# Patient Record
Sex: FEMALE | Race: WHITE | Employment: OTHER | URBAN - METROPOLITAN AREA
[De-identification: names, ages, dates, MRNs, and addresses within clinical notes are randomized per-mention and may not be internally consistent; named-entity substitution may affect disease eponyms.]

---

## 2017-01-18 RX ORDER — CARVEDILOL 3.12 MG/1
TABLET ORAL
Qty: 180 TABLET | Refills: 0 | Status: SHIPPED | OUTPATIENT
Start: 2017-01-18 | End: 2017-02-09

## 2017-02-06 RX ORDER — MIRABEGRON 25 MG/1
TABLET, FILM COATED, EXTENDED RELEASE ORAL
Qty: 90 TABLET | Refills: 3 | Status: SHIPPED | OUTPATIENT
Start: 2017-02-06 | End: 2017-02-09 | Stop reason: SDUPTHER

## 2017-02-09 ENCOUNTER — OFFICE VISIT (OUTPATIENT)
Dept: INTERNAL MEDICINE CLINIC | Age: 77
End: 2017-02-09

## 2017-02-09 VITALS
SYSTOLIC BLOOD PRESSURE: 120 MMHG | HEIGHT: 62 IN | HEART RATE: 56 BPM | WEIGHT: 133 LBS | BODY MASS INDEX: 24.48 KG/M2 | DIASTOLIC BLOOD PRESSURE: 78 MMHG

## 2017-02-09 DIAGNOSIS — F31.9 BIPOLAR 1 DISORDER (HCC): ICD-10-CM

## 2017-02-09 DIAGNOSIS — R00.1 SINUS BRADYCARDIA: ICD-10-CM

## 2017-02-09 DIAGNOSIS — F31.76 BIPOLAR DISORDER, IN FULL REMISSION, MOST RECENT EPISODE DEPRESSED (HCC): ICD-10-CM

## 2017-02-09 DIAGNOSIS — E78.2 MIXED HYPERLIPIDEMIA: ICD-10-CM

## 2017-02-09 DIAGNOSIS — F03.90 DEMENTIA WITHOUT BEHAVIORAL DISTURBANCE, UNSPECIFIED DEMENTIA TYPE: ICD-10-CM

## 2017-02-09 DIAGNOSIS — I25.89 CARDIAC MICROVASCULAR DISEASE: ICD-10-CM

## 2017-02-09 DIAGNOSIS — I10 ESSENTIAL HYPERTENSION: Primary | ICD-10-CM

## 2017-02-09 DIAGNOSIS — N39.41 URGE URINARY INCONTINENCE: ICD-10-CM

## 2017-02-09 PROCEDURE — 0509F URINE INCON PLAN DOCD: CPT | Performed by: INTERNAL MEDICINE

## 2017-02-09 PROCEDURE — 1123F ACP DISCUSS/DSCN MKR DOCD: CPT | Performed by: INTERNAL MEDICINE

## 2017-02-09 PROCEDURE — 1036F TOBACCO NON-USER: CPT | Performed by: INTERNAL MEDICINE

## 2017-02-09 PROCEDURE — G8399 PT W/DXA RESULTS DOCUMENT: HCPCS | Performed by: INTERNAL MEDICINE

## 2017-02-09 PROCEDURE — G8599 NO ASA/ANTIPLAT THER USE RNG: HCPCS | Performed by: INTERNAL MEDICINE

## 2017-02-09 PROCEDURE — 1090F PRES/ABSN URINE INCON ASSESS: CPT | Performed by: INTERNAL MEDICINE

## 2017-02-09 PROCEDURE — G8420 CALC BMI NORM PARAMETERS: HCPCS | Performed by: INTERNAL MEDICINE

## 2017-02-09 PROCEDURE — 99214 OFFICE O/P EST MOD 30 MIN: CPT | Performed by: INTERNAL MEDICINE

## 2017-02-09 PROCEDURE — G8484 FLU IMMUNIZE NO ADMIN: HCPCS | Performed by: INTERNAL MEDICINE

## 2017-02-09 PROCEDURE — 4040F PNEUMOC VAC/ADMIN/RCVD: CPT | Performed by: INTERNAL MEDICINE

## 2017-02-09 PROCEDURE — G8427 DOCREV CUR MEDS BY ELIG CLIN: HCPCS | Performed by: INTERNAL MEDICINE

## 2017-02-09 RX ORDER — ISOSORBIDE MONONITRATE 30 MG/1
30 TABLET, EXTENDED RELEASE ORAL DAILY
Qty: 90 TABLET | Refills: 3 | Status: SHIPPED | OUTPATIENT
Start: 2017-02-09 | End: 2017-05-04

## 2017-02-09 RX ORDER — ISOSORBIDE MONONITRATE 30 MG/1
30 TABLET, EXTENDED RELEASE ORAL DAILY
Qty: 90 TABLET | Refills: 3 | Status: SHIPPED | OUTPATIENT
Start: 2017-02-09 | End: 2017-02-09 | Stop reason: SDUPTHER

## 2017-02-09 ASSESSMENT — ENCOUNTER SYMPTOMS
WHEEZING: 0
ABDOMINAL PAIN: 0
SHORTNESS OF BREATH: 0
BLOOD IN STOOL: 0
VOMITING: 0
DIARRHEA: 0
CHEST TIGHTNESS: 0
NAUSEA: 0
COUGH: 0

## 2017-02-10 LAB — TSH REFLEX: 3.46 UIU/ML (ref 0.27–4.2)

## 2017-02-10 RX ORDER — ATORVASTATIN CALCIUM 20 MG/1
TABLET, FILM COATED ORAL
Qty: 90 TABLET | Refills: 0 | Status: SHIPPED | OUTPATIENT
Start: 2017-02-10 | End: 2017-05-04 | Stop reason: SDUPTHER

## 2017-03-03 RX ORDER — TRAMADOL HYDROCHLORIDE 50 MG/1
50 TABLET ORAL EVERY 6 HOURS PRN
Qty: 60 TABLET | Refills: 0 | Status: ON HOLD | OUTPATIENT
Start: 2017-03-03 | End: 2017-03-29

## 2017-03-09 ENCOUNTER — TELEPHONE (OUTPATIENT)
Dept: INTERNAL MEDICINE CLINIC | Age: 77
End: 2017-03-09

## 2017-03-16 ENCOUNTER — HOSPITAL ENCOUNTER (OUTPATIENT)
Dept: OTHER | Age: 77
Discharge: HOME | End: 2017-03-16
Attending: INTERNAL MEDICINE | Admitting: INTERNAL MEDICINE

## 2017-03-16 ENCOUNTER — TELEPHONE (OUTPATIENT)
Dept: INTERNAL MEDICINE CLINIC | Age: 77
End: 2017-03-16

## 2017-03-16 DIAGNOSIS — R00.2 PALPITATIONS: Primary | ICD-10-CM

## 2017-03-16 LAB
EKG ATRIAL RATE: 86 BPM
EKG DIAGNOSIS: NORMAL
EKG P AXIS: -7 DEGREES
EKG P-R INTERVAL: 158 MS
EKG Q-T INTERVAL: 368 MS
EKG QRS DURATION: 80 MS
EKG QTC CALCULATION (BAZETT): 440 MS
EKG R AXIS: 30 DEGREES
EKG T AXIS: 0 DEGREES
EKG VENTRICULAR RATE: 86 BPM

## 2017-03-16 PROCEDURE — 93010 ELECTROCARDIOGRAM REPORT: CPT | Performed by: INTERNAL MEDICINE

## 2017-03-16 PROCEDURE — 93005 ELECTROCARDIOGRAM TRACING: CPT | Performed by: INTERNAL MEDICINE

## 2017-03-17 ENCOUNTER — CARE COORDINATION (OUTPATIENT)
Dept: CARE COORDINATION | Age: 77
End: 2017-03-17

## 2017-03-17 ENCOUNTER — TELEPHONE (OUTPATIENT)
Dept: INTERNAL MEDICINE CLINIC | Age: 77
End: 2017-03-17

## 2017-03-17 DIAGNOSIS — R07.9 CHEST PAIN, UNSPECIFIED TYPE: Primary | ICD-10-CM

## 2017-03-17 DIAGNOSIS — R94.31 ABNORMAL EKG: ICD-10-CM

## 2017-03-20 PROBLEM — R06.09 DOE (DYSPNEA ON EXERTION): Status: ACTIVE | Noted: 2017-03-20

## 2017-03-21 PROBLEM — N12 PYELONEPHRITIS: Status: ACTIVE | Noted: 2017-03-21

## 2017-03-21 PROBLEM — J96.01 ACUTE RESPIRATORY FAILURE WITH HYPOXIA (HCC): Status: ACTIVE | Noted: 2017-03-21

## 2017-03-21 PROBLEM — J69.0 ASPIRATION PNEUMONITIS (HCC): Status: ACTIVE | Noted: 2017-03-21

## 2017-03-24 RX ORDER — TRAMADOL HYDROCHLORIDE 50 MG/1
50 TABLET ORAL EVERY 6 HOURS PRN
Qty: 180 TABLET | Refills: 0 | Status: CANCELLED | OUTPATIENT
Start: 2017-03-24

## 2017-03-27 PROBLEM — R50.9 FEVER: Status: ACTIVE | Noted: 2017-03-27

## 2017-03-27 RX ORDER — CARVEDILOL 3.12 MG/1
TABLET ORAL
Qty: 180 TABLET | Refills: 0 | Status: SHIPPED | OUTPATIENT
Start: 2017-03-27 | End: 2017-05-04

## 2017-03-28 ENCOUNTER — CARE COORDINATOR VISIT (OUTPATIENT)
Dept: CASE MANAGEMENT | Age: 77
End: 2017-03-28

## 2017-03-28 PROBLEM — R93.89 ABNORMAL CXR: Status: ACTIVE | Noted: 2017-03-28

## 2017-04-06 ENCOUNTER — CARE COORDINATION (OUTPATIENT)
Dept: CASE MANAGEMENT | Age: 77
End: 2017-04-06

## 2017-04-06 RX ORDER — OLANZAPINE AND FLUOXETINE 6; 25 MG/1; MG/1
CAPSULE ORAL
Qty: 90 CAPSULE | Refills: 0 | Status: SHIPPED | OUTPATIENT
Start: 2017-04-06 | End: 2017-07-24 | Stop reason: SDUPTHER

## 2017-04-06 RX ORDER — DONEPEZIL HYDROCHLORIDE 10 MG/1
TABLET, FILM COATED ORAL
Qty: 90 TABLET | Refills: 1 | Status: SHIPPED | OUTPATIENT
Start: 2017-04-06 | End: 2017-05-04

## 2017-04-13 ENCOUNTER — CARE COORDINATION (OUTPATIENT)
Dept: CASE MANAGEMENT | Age: 77
End: 2017-04-13

## 2017-04-27 ENCOUNTER — CARE COORDINATION (OUTPATIENT)
Dept: CASE MANAGEMENT | Age: 77
End: 2017-04-27

## 2017-04-29 ENCOUNTER — CARE COORDINATION (OUTPATIENT)
Dept: CASE MANAGEMENT | Age: 77
End: 2017-04-29

## 2017-04-30 ENCOUNTER — CARE COORDINATION (OUTPATIENT)
Dept: CASE MANAGEMENT | Age: 77
End: 2017-04-30

## 2017-05-01 ENCOUNTER — CARE COORDINATION (OUTPATIENT)
Dept: CASE MANAGEMENT | Age: 77
End: 2017-05-01

## 2017-05-04 ENCOUNTER — OFFICE VISIT (OUTPATIENT)
Dept: INTERNAL MEDICINE CLINIC | Age: 77
End: 2017-05-04

## 2017-05-04 VITALS
DIASTOLIC BLOOD PRESSURE: 60 MMHG | HEIGHT: 60 IN | SYSTOLIC BLOOD PRESSURE: 88 MMHG | WEIGHT: 125 LBS | HEART RATE: 68 BPM | BODY MASS INDEX: 24.54 KG/M2

## 2017-05-04 DIAGNOSIS — N20.0 RENAL CALCULI: ICD-10-CM

## 2017-05-04 DIAGNOSIS — I10 ESSENTIAL HYPERTENSION: ICD-10-CM

## 2017-05-04 DIAGNOSIS — E78.2 MIXED HYPERLIPIDEMIA: Primary | ICD-10-CM

## 2017-05-04 DIAGNOSIS — F31.76 BIPOLAR DISORDER, IN FULL REMISSION, MOST RECENT EPISODE DEPRESSED (HCC): ICD-10-CM

## 2017-05-04 DIAGNOSIS — N39.41 URGE URINARY INCONTINENCE: ICD-10-CM

## 2017-05-04 DIAGNOSIS — K21.9 GASTROESOPHAGEAL REFLUX DISEASE WITHOUT ESOPHAGITIS: ICD-10-CM

## 2017-05-04 PROBLEM — J96.01 ACUTE RESPIRATORY FAILURE WITH HYPOXIA (HCC): Status: RESOLVED | Noted: 2017-03-21 | Resolved: 2017-05-04

## 2017-05-04 PROCEDURE — 1123F ACP DISCUSS/DSCN MKR DOCD: CPT | Performed by: INTERNAL MEDICINE

## 2017-05-04 PROCEDURE — G8598 ASA/ANTIPLAT THER USED: HCPCS | Performed by: INTERNAL MEDICINE

## 2017-05-04 PROCEDURE — G8420 CALC BMI NORM PARAMETERS: HCPCS | Performed by: INTERNAL MEDICINE

## 2017-05-04 PROCEDURE — G8399 PT W/DXA RESULTS DOCUMENT: HCPCS | Performed by: INTERNAL MEDICINE

## 2017-05-04 PROCEDURE — 4040F PNEUMOC VAC/ADMIN/RCVD: CPT | Performed by: INTERNAL MEDICINE

## 2017-05-04 PROCEDURE — 99214 OFFICE O/P EST MOD 30 MIN: CPT | Performed by: INTERNAL MEDICINE

## 2017-05-04 PROCEDURE — G8427 DOCREV CUR MEDS BY ELIG CLIN: HCPCS | Performed by: INTERNAL MEDICINE

## 2017-05-04 PROCEDURE — 1036F TOBACCO NON-USER: CPT | Performed by: INTERNAL MEDICINE

## 2017-05-04 PROCEDURE — 0509F URINE INCON PLAN DOCD: CPT | Performed by: INTERNAL MEDICINE

## 2017-05-04 PROCEDURE — 1090F PRES/ABSN URINE INCON ASSESS: CPT | Performed by: INTERNAL MEDICINE

## 2017-05-04 RX ORDER — ATORVASTATIN CALCIUM 20 MG/1
TABLET, FILM COATED ORAL
Qty: 90 TABLET | Refills: 0 | Status: SHIPPED | OUTPATIENT
Start: 2017-05-04 | End: 2017-10-13 | Stop reason: SDUPTHER

## 2017-05-04 RX ORDER — LANSOPRAZOLE 30 MG/1
CAPSULE, DELAYED RELEASE ORAL
Qty: 90 CAPSULE | Refills: 0 | Status: SHIPPED | OUTPATIENT
Start: 2017-05-04 | End: 2017-07-24 | Stop reason: SDUPTHER

## 2017-05-04 ASSESSMENT — ENCOUNTER SYMPTOMS
BLOOD IN STOOL: 0
NAUSEA: 0
DIARRHEA: 0
CHEST TIGHTNESS: 0
WHEEZING: 0
SHORTNESS OF BREATH: 0
VOMITING: 0
COUGH: 0
ABDOMINAL PAIN: 0

## 2017-05-05 ENCOUNTER — TELEPHONE (OUTPATIENT)
Dept: INTERNAL MEDICINE CLINIC | Age: 77
End: 2017-05-05

## 2017-05-18 RX ORDER — TRAMADOL HYDROCHLORIDE 50 MG/1
50 TABLET ORAL EVERY 6 HOURS PRN
Qty: 60 TABLET | Refills: 0 | Status: SHIPPED | OUTPATIENT
Start: 2017-05-18 | End: 2017-07-13 | Stop reason: SDUPTHER

## 2017-05-19 ENCOUNTER — HOSPITAL ENCOUNTER (OUTPATIENT)
Dept: OTHER | Age: 77
Discharge: HOME | End: 2017-05-19
Attending: UROLOGY | Admitting: UROLOGY

## 2017-05-19 DIAGNOSIS — N20.0 KIDNEY STONE: ICD-10-CM

## 2017-06-05 ENCOUNTER — TELEPHONE (OUTPATIENT)
Dept: INTERNAL MEDICINE CLINIC | Age: 77
End: 2017-06-05

## 2017-07-03 RX ORDER — POLYETHYLENE GLYCOL 3350 17 G/17G
POWDER, FOR SOLUTION ORAL
Qty: 1581 G | Refills: 5 | Status: ON HOLD | OUTPATIENT
Start: 2017-07-03 | End: 2019-09-26 | Stop reason: HOSPADM

## 2017-07-14 RX ORDER — TRAMADOL HYDROCHLORIDE 50 MG/1
50 TABLET ORAL EVERY 6 HOURS PRN
Qty: 180 TABLET | Refills: 0 | Status: ON HOLD | OUTPATIENT
Start: 2017-07-14 | End: 2017-11-12

## 2017-07-24 RX ORDER — CARVEDILOL 3.12 MG/1
TABLET ORAL
Qty: 180 TABLET | Refills: 3 | Status: ON HOLD | OUTPATIENT
Start: 2017-07-24 | End: 2017-08-04 | Stop reason: ALTCHOICE

## 2017-07-24 RX ORDER — OLANZAPINE AND FLUOXETINE 6; 25 MG/1; MG/1
CAPSULE ORAL
Qty: 90 CAPSULE | Refills: 0 | Status: SHIPPED | OUTPATIENT
Start: 2017-07-24 | End: 2017-10-01 | Stop reason: SDUPTHER

## 2017-07-24 RX ORDER — LANSOPRAZOLE 30 MG/1
CAPSULE, DELAYED RELEASE ORAL
Qty: 90 CAPSULE | Refills: 3 | Status: SHIPPED | OUTPATIENT
Start: 2017-07-24 | End: 2018-02-08 | Stop reason: SDUPTHER

## 2017-08-07 ENCOUNTER — CARE COORDINATION (OUTPATIENT)
Dept: CASE MANAGEMENT | Age: 77
End: 2017-08-07

## 2017-08-07 ENCOUNTER — TELEPHONE (OUTPATIENT)
Dept: PHARMACY | Facility: CLINIC | Age: 77
End: 2017-08-07

## 2017-08-10 ENCOUNTER — OFFICE VISIT (OUTPATIENT)
Dept: INTERNAL MEDICINE CLINIC | Age: 77
End: 2017-08-10

## 2017-08-10 VITALS
SYSTOLIC BLOOD PRESSURE: 110 MMHG | BODY MASS INDEX: 23.74 KG/M2 | WEIGHT: 134 LBS | HEART RATE: 60 BPM | HEIGHT: 63 IN | DIASTOLIC BLOOD PRESSURE: 62 MMHG

## 2017-08-10 DIAGNOSIS — E78.2 MIXED HYPERLIPIDEMIA: ICD-10-CM

## 2017-08-10 DIAGNOSIS — I10 ESSENTIAL HYPERTENSION: Primary | ICD-10-CM

## 2017-08-10 DIAGNOSIS — E04.1 THYROID NODULE: ICD-10-CM

## 2017-08-10 DIAGNOSIS — N39.41 URGE URINARY INCONTINENCE: ICD-10-CM

## 2017-08-10 PROBLEM — J69.0 ASPIRATION PNEUMONITIS (HCC): Status: RESOLVED | Noted: 2017-03-21 | Resolved: 2017-08-10

## 2017-08-10 PROCEDURE — G8399 PT W/DXA RESULTS DOCUMENT: HCPCS | Performed by: INTERNAL MEDICINE

## 2017-08-10 PROCEDURE — 1090F PRES/ABSN URINE INCON ASSESS: CPT | Performed by: INTERNAL MEDICINE

## 2017-08-10 PROCEDURE — G8427 DOCREV CUR MEDS BY ELIG CLIN: HCPCS | Performed by: INTERNAL MEDICINE

## 2017-08-10 PROCEDURE — 4040F PNEUMOC VAC/ADMIN/RCVD: CPT | Performed by: INTERNAL MEDICINE

## 2017-08-10 PROCEDURE — 99214 OFFICE O/P EST MOD 30 MIN: CPT | Performed by: INTERNAL MEDICINE

## 2017-08-10 PROCEDURE — G8598 ASA/ANTIPLAT THER USED: HCPCS | Performed by: INTERNAL MEDICINE

## 2017-08-10 PROCEDURE — 0509F URINE INCON PLAN DOCD: CPT | Performed by: INTERNAL MEDICINE

## 2017-08-10 PROCEDURE — 1123F ACP DISCUSS/DSCN MKR DOCD: CPT | Performed by: INTERNAL MEDICINE

## 2017-08-10 PROCEDURE — 1036F TOBACCO NON-USER: CPT | Performed by: INTERNAL MEDICINE

## 2017-08-10 PROCEDURE — G8420 CALC BMI NORM PARAMETERS: HCPCS | Performed by: INTERNAL MEDICINE

## 2017-08-10 PROCEDURE — 1111F DSCHRG MED/CURRENT MED MERGE: CPT | Performed by: INTERNAL MEDICINE

## 2017-08-10 ASSESSMENT — ENCOUNTER SYMPTOMS
CHEST TIGHTNESS: 0
VOMITING: 0
DIARRHEA: 0
NAUSEA: 0
COUGH: 0
SHORTNESS OF BREATH: 0
ABDOMINAL PAIN: 0
WHEEZING: 0
BLOOD IN STOOL: 0

## 2017-08-11 ENCOUNTER — CARE COORDINATION (OUTPATIENT)
Dept: CASE MANAGEMENT | Age: 77
End: 2017-08-11

## 2017-08-16 ENCOUNTER — HOSPITAL ENCOUNTER (OUTPATIENT)
Dept: ULTRASOUND IMAGING | Age: 77
Discharge: HOME | End: 2017-08-16
Attending: INTERNAL MEDICINE | Admitting: INTERNAL MEDICINE

## 2017-08-16 DIAGNOSIS — E04.1 NONTOXIC SINGLE THYROID NODULE: ICD-10-CM

## 2017-08-16 DIAGNOSIS — E04.1 THYROID NODULE: ICD-10-CM

## 2017-08-17 ENCOUNTER — CARE COORDINATION (OUTPATIENT)
Dept: CASE MANAGEMENT | Age: 77
End: 2017-08-17

## 2017-08-22 ENCOUNTER — CARE COORDINATION (OUTPATIENT)
Dept: CARE COORDINATION | Age: 77
End: 2017-08-22

## 2017-08-23 ENCOUNTER — TELEPHONE (OUTPATIENT)
Dept: INTERNAL MEDICINE CLINIC | Age: 77
End: 2017-08-23

## 2017-08-23 DIAGNOSIS — E04.1 THYROID NODULE: Primary | ICD-10-CM

## 2017-09-07 ENCOUNTER — HOSPITAL ENCOUNTER (OUTPATIENT)
Dept: GENERAL RADIOLOGY | Age: 77
Discharge: HOME | End: 2017-09-07
Attending: INTERNAL MEDICINE | Admitting: INTERNAL MEDICINE

## 2017-09-07 VITALS
RESPIRATION RATE: 15 BRPM | HEART RATE: 64 BPM | DIASTOLIC BLOOD PRESSURE: 73 MMHG | OXYGEN SATURATION: 98 % | SYSTOLIC BLOOD PRESSURE: 150 MMHG

## 2017-09-07 DIAGNOSIS — E04.1 THYROID NODULE: ICD-10-CM

## 2017-09-07 DIAGNOSIS — E04.1 NONTOXIC SINGLE THYROID NODULE: ICD-10-CM

## 2017-09-13 ENCOUNTER — CARE COORDINATION (OUTPATIENT)
Dept: CARE COORDINATION | Age: 77
End: 2017-09-13

## 2017-09-18 ENCOUNTER — TELEPHONE (OUTPATIENT)
Dept: INTERNAL MEDICINE CLINIC | Age: 77
End: 2017-09-18

## 2017-10-02 RX ORDER — OLANZAPINE AND FLUOXETINE 6; 25 MG/1; MG/1
CAPSULE ORAL
Qty: 90 CAPSULE | Refills: 0 | Status: SHIPPED | OUTPATIENT
Start: 2017-10-02 | End: 2018-01-13 | Stop reason: SDUPTHER

## 2017-10-06 ENCOUNTER — CARE COORDINATION (OUTPATIENT)
Dept: CARE COORDINATION | Age: 77
End: 2017-10-06

## 2017-10-06 NOTE — CARE COORDINATION
Ambulatory Care Coordination Note  10/6/2017  CM Risk Score: 5  Yanira Mortality Risk Score: 8.97    ACC: Stefano Graf RN    Summary Note: ACC spoke with Eran Diaz for follow up after ED visit on 10/5/17. She is feeling better today. She had shortness of breath and some trouble saying her words. She said this has been an issue for a while. She said at times she cannot get her words to come out. She has been under a lot of stress over the past few weeks. Her spouse passed away. I spoke to her about the effects of grief and stress. Patient did feel like the dyspnea ans speech episodes have been more of an issue since her husbands death. I have asked her to come in for an earlier appointment for evaluation but she did not want to at this time. She has an adult son living with her that does help with her care. She has twice a week services from [employer]. Patient can have more services but currently declines. Plan to address again on next call. Reviewed s/s to call for physician for. Follow up call planned for next week . Reviewed relaxation breathing techniques    Plan     Offer spiritual referral again  Evaluate home service needs  Medication mgt with her son if available. He manages meds (pt is forgetful)          Care Coordination Interventions    Program Enrollment:  Rising Risk   Referral from Primary Care Provider:  No   Suggested Interventions and Community Resources   Fall Risk Prevention: In Process   Medication Assistance Program:  Declined (Comment: patient states she manages her own meds)   Senior Services:  Completed (Comment[facility]. Personal aid servcies twice a week. Edwin )             Goals Addressed             Most Recent     Reduce Falls    On track (10/6/2017)             I will reduce my risk of falls by the following: Remove rugs or use non slip rugs  Install grab bars in bathroom  Use walking aids like cane or walker    Barriers: impairment:  Cognitive.

## 2017-10-13 RX ORDER — ATORVASTATIN CALCIUM 20 MG/1
TABLET, FILM COATED ORAL
Qty: 90 TABLET | Refills: 0 | Status: SHIPPED | OUTPATIENT
Start: 2017-10-13 | End: 2018-01-26 | Stop reason: SDUPTHER

## 2017-10-16 RX ORDER — ISOSORBIDE MONONITRATE 30 MG/1
30 TABLET, EXTENDED RELEASE ORAL DAILY
Qty: 30 TABLET | Refills: 11 | Status: ON HOLD | OUTPATIENT
Start: 2017-10-16 | End: 2017-11-12

## 2017-10-16 NOTE — TELEPHONE ENCOUNTER
Medication Refill    When was your last appointment with cardiology?  (if 1year or longer, please schedule an appointment)    Medication needing refilled: isosorb mono tab    Doseage of the medication: 30 mg ER    How are you taking this medication (QD, BID, TID, QID, PRN): qd    Patient want a 30 or 90 day supply called in: 80    Which Pharmacy are we sending the medication to: [facility]    Last OV 7.19.16    Assessment:      1. NSTEMI (non-ST elevated myocardial infarction) [facility]):  Likely from microvascular disease as no obstructive CAD found on cath. Continue Imdur 30 mg 1 daily and Nitro 0.4 mg SL as needed for break through chest pain. Advised to lie down prior to taking Nitro discussed meds with son who lives with her. Only rare episode of CP relieved with 1 SL NTG and her son believes it's more related to anxiety than anything else. 2. HTN (hypertension) Stable and will continue present medical regimen. 3. S/P cardiac cath [facility] 5/23/15 which revealed LM <20% LAD 20% Cx <20% RCA <20% LV: mild global hypokinesis, EF 40-50% NSTEMI possibly due to microvascular disease. Most recent ECHO 5/25/15 EF 74% grade I diastolic dysfunction . Aortic valve appears sclerotic but opens adequately. Mild mitral, aortic, and tricuspid regurgitation.      4. Hyperlipidemia:    Last lipids 11/23/15   TG 49    LDL  38; Well controlled and at goal and will continue current medical regimen. Managed by PCP        Plan:  1. Would repeat Lipid and Liver panels in November 2016.  2. Otherwise doing well overall from a cardiac standpoint. 3. She will follow up with me in 1 year.

## 2017-11-15 ENCOUNTER — CARE COORDINATION (OUTPATIENT)
Dept: CASE MANAGEMENT | Age: 77
End: 2017-11-15

## 2017-11-15 ENCOUNTER — TELEPHONE (OUTPATIENT)
Dept: PHARMACY | Facility: CLINIC | Age: 77
End: 2017-11-15

## 2017-11-15 ENCOUNTER — OFFICE VISIT (OUTPATIENT)
Dept: INTERNAL MEDICINE CLINIC | Age: 77
End: 2017-11-15

## 2017-11-15 VITALS
SYSTOLIC BLOOD PRESSURE: 86 MMHG | DIASTOLIC BLOOD PRESSURE: 64 MMHG | WEIGHT: 134 LBS | HEIGHT: 62 IN | BODY MASS INDEX: 24.66 KG/M2 | HEART RATE: 68 BPM

## 2017-11-15 DIAGNOSIS — I10 ESSENTIAL HYPERTENSION: ICD-10-CM

## 2017-11-15 DIAGNOSIS — G93.41 METABOLIC ENCEPHALOPATHY: Primary | ICD-10-CM

## 2017-11-15 DIAGNOSIS — N39.0 URINARY TRACT INFECTION WITHOUT HEMATURIA, SITE UNSPECIFIED: ICD-10-CM

## 2017-11-15 DIAGNOSIS — N39.41 URGE URINARY INCONTINENCE: ICD-10-CM

## 2017-11-15 PROCEDURE — 99496 TRANSJ CARE MGMT HIGH F2F 7D: CPT | Performed by: INTERNAL MEDICINE

## 2017-11-15 RX ORDER — MELATONIN 10 MG
10 CAPSULE ORAL NIGHTLY
COMMUNITY
End: 2018-08-21

## 2017-11-15 RX ORDER — FERROUS SULFATE 325(65) MG
325 TABLET ORAL
Status: ON HOLD | COMMUNITY
End: 2019-09-26 | Stop reason: HOSPADM

## 2017-11-15 RX ORDER — ACETAMINOPHEN 500 MG
1000 TABLET ORAL EVERY 6 HOURS PRN
Status: ON HOLD | COMMUNITY
End: 2018-07-02 | Stop reason: HOSPADM

## 2017-11-15 RX ORDER — TRAMADOL HYDROCHLORIDE 50 MG/1
50 TABLET ORAL EVERY 12 HOURS PRN
Qty: 180 TABLET | Refills: 0 | Status: ON HOLD | OUTPATIENT
Start: 2017-11-15 | End: 2018-11-20

## 2017-11-15 NOTE — TELEPHONE ENCOUNTER
CLINICAL PHARMACY NOTE  Post-Discharge Transitions of Care (SAMANTHA)    Non-face-to-face services provided:  Assessment and support for treatment adherence and medication management-medication reconciliation    - There are no outstanding medication issues at this time. Subjective/Objective:  Kavon Gerardo is a 76 y.o. female. Patient was discharged from [facility] on 11/14/17 with a diagnosis of AMS 2/2 UTI. Patient outreach to review discharge medications and provide medication review and management. Spoke with son    Allergies   Allergen Reactions    Advil [Ibuprofen Micronized]     Bactrim     Erythromycin     Flexeril [Cyclobenzaprine Hcl]     Guaifenesin Er     Wellbutrin [Bupropion Hcl]        Discharge Medications (as per discharging medication list found): There are NEW medications for you. START taking them after you leave the hospital   amLODIPine (NORVASC) 2.5 MG tablet  · Son states that he will pick this up from the pharmacy after pts OV today. Take 1 tablet by mouth daily    levofloxacin (LEVAQUIN) 500 MG tablet  · Son will  today. Pt was taking while inpatient. No issues reported. Urinalysis resulted mixed brandon. Take 1 tablet by mouth daily for 3 days     These are medications you told us you were taking at home, CONTINUE taking them after you leave the hospital   Medication Sig    acetaminophen (TYLENOL) 500 MG tablet  · Added to home medication list - pt has been taking more frequently since tramadol was stopped. Instructed son that the patient cannot take anymore than 4000 mg/day and to keep a close eye on how much she is taking.   Take 1,000 mg by mouth every 6 hours as needed for Pain    Melatonin 10 MG CAPS  · Added to home medication list.  Take 10 mg by mouth nightly    ferrous sulfate 325 (65 Fe) MG tablet  · Added to home medication list.  Take 325 mg by mouth daily (with breakfast)    methylcellulose (CITRUCEL) 500 MG TABS  · Added to home medication

## 2017-11-15 NOTE — PATIENT INSTRUCTIONS
Patient Self-Management Goal for Health Maintenance  Goal: I will follow a healthy diet as discussed by my provider.   Barriers: none  Plan for overcoming my barriers: N/A  Confidence: 10/10  Anticipated Goal Completion Date: 02/15/18

## 2017-11-15 NOTE — PROGRESS NOTES
Serge Dumont received counseling on the following healthy behaviors: nutrition  Reviewed prior labs and health maintenance  Continue current medications, diet and exercise. Discussed use, benefit, and side effects of prescribed medications. Barriers to medication compliance addressed. Patient given educational materials - see patient instructions  Was a self-tracking handout given in paper form or via [employer]? Yes    Requested Prescriptions      No prescriptions requested or ordered in this encounter       All patient questions answered. Patient voiced understanding. Quality Measures    Body mass index is 24.51 kg/m². Elevated. Weight control planned discussed Healthy diet and regular exercise. . Blood pressure is normal. Treatment plan consists of No treatment change needed. Fall Risk 8/22/2017 10/26/2016 11/20/2015 8/19/2015   2 or more falls in past year? no no no no   Fall with injury in past year? no no no no     The patient does not have a history of falls. I did , complete a risk assessment for falls. A plan of care for falls No Treatment plan indicated    Lab Results   Component Value Date    LDLCALC 27 08/05/2017    (goal LDL reduction with dx if diabetes is 50% LDL reduction)    No flowsheet data found.   Interpretation of Total Score Depression Severity: 1-4 = Minimal depression, 5-9 = Mild depression, 10-14 = Moderate depression, 15-19 = Moderately severe depression, 20-27 = Severe depression

## 2017-11-15 NOTE — PROGRESS NOTES
Post-Discharge Transitional Care Management Services      Walter Boucher   YOB: 1940    Date of Visit:  11/15/2017  30 Day Post-Discharge Date: 12/14/17    Allergies   Allergen Reactions    Advil [Ibuprofen Micronized]     Bactrim     Erythromycin     Flexeril [Cyclobenzaprine Hcl]     Guaifenesin Er     Wellbutrin [Bupropion Hcl]      No outpatient prescriptions have been marked as taking for the 11/15/17 encounter (Office Visit) with Johnnie Sim MD.         Vitals:    11/15/17 1308   Weight: 134 lb (60.8 kg)   Height: 5' 2\" (1.575 m)     Body mass index is 24.51 kg/m². Wt Readings from Last 3 Encounters:   11/15/17 134 lb (60.8 kg)   11/11/17 138 lb (62.6 kg)   10/04/17 140 lb (63.5 kg)     BP Readings from Last 3 Encounters:   11/14/17 114/77   10/04/17 134/83   09/07/17 (!) 150/73        Patient was admitted to [facility] from 11/11/17 to 14/98/55 for Metabolic encephalopathy and UTI. Inpatient course: Discharge summary reviewed- see chart.     Current status: good    Review of Systems:  A comprehensive review of systems was negative except for: Constitutional: positive for fatigue    Physical Exam:  General Appearance: alert and oriented to person, place and time, well developed and well- nourished, in no acute distress  Skin: warm and dry, no rash or erythema  Head: normocephalic and atraumatic  Eyes: pupils equal, round, and reactive to light, extraocular eye movements intact, conjunctivae normal  ENT: tympanic membrane, external ear and ear canal normal bilaterally, nose without deformity, nasal mucosa and turbinates normal without polyps  Neck: supple and non-tender without mass, no thyromegaly or thyroid nodules, no cervical lymphadenopathy  Pulmonary/Chest: clear to auscultation bilaterally- no wheezes, rales or rhonchi, normal air movement, no respiratory distress  Cardiovascular: normal rate, regular rhythm, normal S1 and S2, no murmurs, rubs, clicks, or gallops, distal pulses intact, no carotid bruits  Abdomen: soft, non-tender, non-distended, normal bowel sounds, no masses or organomegaly  Extremities: no cyanosis, clubbing or edema  Musculoskeletal: normal range of motion, no joint swelling, deformity or tenderness  Neurologic: reflexes normal and symmetric, no cranial nerve deficit, gait, coordination and speech normal    Initial post-discharge communication occurred between nurse care coordinator and patient on 11/15/17- see documentation in chart: telephone encounter. Assessment/Plan:  Noemi Easley was seen today for follow-up from hospital.    Diagnoses and all orders for this visit:    Metabolic encephalopathy    Urinary tract infection without hematuria, site unspecified    Essential hypertension    Urge urinary incontinence      Now alert and oriented. Finish levaquin. Bp is low. D/c amlodipine.       Continue myrbetriq      Diagnostic test results reviewed: inpatient labs and culture/micro-urine    Patient risk of morbidity and mortality: moderate    Medical Decision Making: low complexity

## 2017-11-17 ENCOUNTER — CARE COORDINATION (OUTPATIENT)
Dept: CASE MANAGEMENT | Age: 77
End: 2017-11-17

## 2017-11-17 ENCOUNTER — TELEPHONE (OUTPATIENT)
Dept: INTERNAL MEDICINE CLINIC | Age: 77
End: 2017-11-17

## 2017-11-17 NOTE — CARE COORDINATION
[facility] Transitions Follow Up Call    2017    Patient: Zainab Guerra  Patient : 1940   MRN: [id number]  Reason for Admission: UTI  Discharge Date: 17 RARS: Risk Score: 22.75       Spoke with: Luis Winn (the patient)    Non-face-to-face services provided:  Education of patient/family/caregiver/guardian to support self-management-call [facility] for needs over weekend      Care Transitions Subsequent and Final Call    Subsequent and Final Calls  Do you have any ongoing symptoms?:  No  Have your medications changed?:  No  Do you have any questions related to your medications?:  No  Do you currently have any active services?:  Yes  Are you currently active with any services?:  Other, Home Health  Do you have any needs or concerns that I can assist you with?:  No  Identified Barriers:  Impairment, Other  Care Transitions Interventions     Other Services:  Declined   Other Interventions:        SN PT OT have all been out to see patient. She knows how to reach them for prn needs. She is \"okay\". No falls. Care transition will continue.      Follow Up  Future Appointments  Date Time Provider Eleazar Queen   2018 11:20 AM Daniel Le MD [place] [facility] None       Castillo Ibarra RN

## 2017-11-17 NOTE — TELEPHONE ENCOUNTER
----- Message from Ayla Bellamy MD sent at 11/17/2017  1:55 PM EST -----  Contact: Swetha from [facility]  ----- Message -----  From: Preet Edmond  Sent: 11/17/2017   1:22 PM  To: Ayla Bellamy MD    Called and requested you signed orders for pt to have home care for occupational therapy    Last Visit: 11/15/17  Future Visit: 02/08/18    [facility] #: [phone]

## 2017-11-21 ENCOUNTER — TELEPHONE (OUTPATIENT)
Dept: INTERNAL MEDICINE CLINIC | Age: 77
End: 2017-11-21

## 2017-11-21 DIAGNOSIS — R53.1 WEAKNESS: Primary | ICD-10-CM

## 2017-11-21 DIAGNOSIS — R26.81 UNSTEADY GAIT: ICD-10-CM

## 2017-11-22 ENCOUNTER — CARE COORDINATION (OUTPATIENT)
Dept: CASE MANAGEMENT | Age: 77
End: 2017-11-22

## 2017-11-22 NOTE — CARE COORDINATION
discuss with patient.      Future Appointments  Date Time Provider Eleazar Queen   2/8/2018 11:20 AM MD [place] [place] None       Saima Peterson RN

## 2017-11-28 ENCOUNTER — CARE COORDINATION (OUTPATIENT)
Dept: CASE MANAGEMENT | Age: 77
End: 2017-11-28

## 2017-12-01 ENCOUNTER — TELEPHONE (OUTPATIENT)
Dept: INTERNAL MEDICINE CLINIC | Age: 77
End: 2017-12-01

## 2017-12-01 ENCOUNTER — CARE COORDINATION (OUTPATIENT)
Dept: CARE COORDINATION | Age: 77
End: 2017-12-01

## 2017-12-01 NOTE — CARE COORDINATION
Ambulatory Care Coordination Note  12/1/2017  CM Risk Score: 12  Yanira Mortality Risk Score: 8.97    ACC: Susanna Bundy, RN    Summary Note: ACC contacted to resume care coordination post discharge. Patient discharged on 11/14 with UTI. She is improving. States she has no UTI symptoms at this time and her strength has improved. She is active with home care from [facility]. Patient denies any swallowing difficulties and told me she is eating regular foods and consuming thin liquids with out any difficult. Son lives with her and helps with her care [place]). Agreeable to ongoing support from care coordination. Unable to review meds at this time. Son assist with medications. Patient receiving visit with PT during call. Past Medical History:   Diagnosis Date    Arthritis     Bipolar disorder ([facility] [address]     Depression     Diverticulosis     GERD (gastroesophageal reflux disease)     Hyperlipidemia     Hypertension     NSTEMI (non-ST elevated myocardial infarction) ([facility] [address] 5/23/15    suspect microvasc dz, Dr. Nydia Hernandez when to call the physician/ s/s reviewed  Evaluate for additional home service needs. [facility] home [facility]. SN, PT, OT speech  Aid services from [address] [place] [address] [employer] Interventions    Program Enrollment:  Rising Risk  Referral from Primary Care Provider:  No  Suggested Interventions and Community Resources  Fall Risk Prevention: In Process  Medication Assistance Program:  Declined (Comment: patient states she manages her own meds)  Senior Services:  Completed (Comment: active with seior services. Personal aid servcies twice a week. Edwin )         Goals Addressed             Most Recent     Conditions and Symptoms   On track (12/1/2017)             I will schedule office visits, as directed by my provider. I will keep my appointment or reschedule if I have to cancel. I will notify my provider of any barriers to my plan of care.   I will notify tablet by mouth daily 5/4/17   Reinaldo Sierra MD   calcium carbonate 600 MG TABS tablet Take 1 tablet by mouth daily 7/6/15   Patrick Altman MD   Multiple Vitamins-Minerals (THERAPEUTIC MULTIVITAMIN-MINERALS) tablet Take 1 tablet by mouth daily 7/13/15   Patrick Altman MD   Hypromellose (GENTEAL MILD TO MODERATE) 0.3 % SOLN Apply 1 drop to eye as needed 1-3 drops in both eyes prn    Historical Provider, MD   fish oil-omega-3 fatty acids 1000 MG capsule Take 1 g by mouth daily     Historical Provider, MD       Future Appointments  Date Time Provider Eleazar Queen   2/8/2018 11:20 AM MD [facility] None

## 2017-12-22 ENCOUNTER — TELEPHONE (OUTPATIENT)
Dept: INTERNAL MEDICINE CLINIC | Age: 77
End: 2017-12-22

## 2018-01-03 ENCOUNTER — TELEPHONE (OUTPATIENT)
Dept: INTERNAL MEDICINE CLINIC | Age: 78
End: 2018-01-03

## 2018-01-05 ENCOUNTER — CARE COORDINATION (OUTPATIENT)
Dept: CARE COORDINATION | Age: 78
End: 2018-01-05

## 2018-01-12 ENCOUNTER — TELEPHONE (OUTPATIENT)
Dept: INTERNAL MEDICINE CLINIC | Age: 78
End: 2018-01-12

## 2018-01-15 RX ORDER — OLANZAPINE AND FLUOXETINE 6; 25 MG/1; MG/1
CAPSULE ORAL
Qty: 90 CAPSULE | Refills: 0 | Status: SHIPPED | OUTPATIENT
Start: 2018-01-15 | End: 2018-04-04 | Stop reason: SDUPTHER

## 2018-01-26 RX ORDER — ATORVASTATIN CALCIUM 20 MG/1
TABLET, FILM COATED ORAL
Qty: 90 TABLET | Refills: 0 | Status: SHIPPED | OUTPATIENT
Start: 2018-01-26 | End: 2018-04-25 | Stop reason: SDUPTHER

## 2018-02-02 ENCOUNTER — CARE COORDINATION (OUTPATIENT)
Dept: CARE COORDINATION | Age: 78
End: 2018-02-02

## 2018-02-02 NOTE — CARE COORDINATION
MODERATE) 0.3 % SOLN Apply 1 drop to eye as needed 1-3 drops in both eyes prn    Historical Provider, MD   fish oil-omega-3 fatty acids 1000 MG capsule Take 1 g by mouth daily     Historical Provider, MD       Future Appointments  Date Time Provider Eleazar Queen   2/8/2018 11:20 AM Eliel Greer MD [facility] None

## 2018-02-08 ENCOUNTER — OFFICE VISIT (OUTPATIENT)
Dept: INTERNAL MEDICINE CLINIC | Age: 78
End: 2018-02-08

## 2018-02-08 VITALS
DIASTOLIC BLOOD PRESSURE: 78 MMHG | SYSTOLIC BLOOD PRESSURE: 120 MMHG | WEIGHT: 146 LBS | HEIGHT: 62 IN | HEART RATE: 72 BPM | BODY MASS INDEX: 26.87 KG/M2

## 2018-02-08 DIAGNOSIS — R10.13 EPIGASTRIC PAIN: ICD-10-CM

## 2018-02-08 DIAGNOSIS — F31.9 BIPOLAR 1 DISORDER (HCC): ICD-10-CM

## 2018-02-08 DIAGNOSIS — I20.8 STABLE ANGINA (HCC): ICD-10-CM

## 2018-02-08 DIAGNOSIS — K21.9 GASTROESOPHAGEAL REFLUX DISEASE WITHOUT ESOPHAGITIS: ICD-10-CM

## 2018-02-08 DIAGNOSIS — F31.76 BIPOLAR DISORDER, IN FULL REMISSION, MOST RECENT EPISODE DEPRESSED (HCC): ICD-10-CM

## 2018-02-08 DIAGNOSIS — I10 ESSENTIAL HYPERTENSION: Primary | ICD-10-CM

## 2018-02-08 DIAGNOSIS — M79.89 SWELLING OF LEFT HAND: ICD-10-CM

## 2018-02-08 DIAGNOSIS — E78.2 MIXED HYPERLIPIDEMIA: ICD-10-CM

## 2018-02-08 DIAGNOSIS — N39.41 URGE URINARY INCONTINENCE: ICD-10-CM

## 2018-02-08 LAB
A/G RATIO: 1.4 (ref 1.1–2.2)
ALBUMIN SERPL-MCNC: 4 G/DL (ref 3.4–5)
ALP BLD-CCNC: 75 U/L (ref 40–129)
ALT SERPL-CCNC: 22 U/L (ref 10–40)
ANION GAP SERPL CALCULATED.3IONS-SCNC: 15 MMOL/L (ref 3–16)
AST SERPL-CCNC: 26 U/L (ref 15–37)
BASOPHILS ABSOLUTE: 0 K/UL (ref 0–0.2)
BASOPHILS RELATIVE PERCENT: 0.2 %
BILIRUB SERPL-MCNC: 0.3 MG/DL (ref 0–1)
BUN BLDV-MCNC: 20 MG/DL (ref 7–20)
CALCIUM SERPL-MCNC: 9.2 MG/DL (ref 8.3–10.6)
CHLORIDE BLD-SCNC: 100 MMOL/L (ref 99–110)
CO2: 24 MMOL/L (ref 21–32)
CREAT SERPL-MCNC: 0.8 MG/DL (ref 0.6–1.2)
EOSINOPHILS ABSOLUTE: 0 K/UL (ref 0–0.6)
EOSINOPHILS RELATIVE PERCENT: 0 %
GFR AFRICAN AMERICAN: >60
GFR NON-AFRICAN AMERICAN: >60
GLOBULIN: 2.9 G/DL
GLUCOSE BLD-MCNC: 110 MG/DL (ref 70–99)
HCT VFR BLD CALC: 36.2 % (ref 36–48)
HEMOGLOBIN: 11.9 G/DL (ref 12–16)
LIPASE: 39 U/L (ref 13–60)
LYMPHOCYTES ABSOLUTE: 1.2 K/UL (ref 1–5.1)
LYMPHOCYTES RELATIVE PERCENT: 21.7 %
MCH RBC QN AUTO: 30.4 PG (ref 26–34)
MCHC RBC AUTO-ENTMCNC: 32.8 G/DL (ref 31–36)
MCV RBC AUTO: 92.9 FL (ref 80–100)
MONOCYTES ABSOLUTE: 0.4 K/UL (ref 0–1.3)
MONOCYTES RELATIVE PERCENT: 7.6 %
NEUTROPHILS ABSOLUTE: 3.8 K/UL (ref 1.7–7.7)
NEUTROPHILS RELATIVE PERCENT: 70.5 %
PDW BLD-RTO: 14.8 % (ref 12.4–15.4)
PLATELET # BLD: 198 K/UL (ref 135–450)
PMV BLD AUTO: 8.4 FL (ref 5–10.5)
POTASSIUM SERPL-SCNC: 4.4 MMOL/L (ref 3.5–5.1)
RBC # BLD: 3.9 M/UL (ref 4–5.2)
SODIUM BLD-SCNC: 139 MMOL/L (ref 136–145)
TOTAL PROTEIN: 6.9 G/DL (ref 6.4–8.2)
WBC # BLD: 5.4 K/UL (ref 4–11)

## 2018-02-08 PROCEDURE — G8484 FLU IMMUNIZE NO ADMIN: HCPCS | Performed by: INTERNAL MEDICINE

## 2018-02-08 PROCEDURE — 1036F TOBACCO NON-USER: CPT | Performed by: INTERNAL MEDICINE

## 2018-02-08 PROCEDURE — 4040F PNEUMOC VAC/ADMIN/RCVD: CPT | Performed by: INTERNAL MEDICINE

## 2018-02-08 PROCEDURE — 99214 OFFICE O/P EST MOD 30 MIN: CPT | Performed by: INTERNAL MEDICINE

## 2018-02-08 PROCEDURE — 1090F PRES/ABSN URINE INCON ASSESS: CPT | Performed by: INTERNAL MEDICINE

## 2018-02-08 PROCEDURE — 0509F URINE INCON PLAN DOCD: CPT | Performed by: INTERNAL MEDICINE

## 2018-02-08 PROCEDURE — G8419 CALC BMI OUT NRM PARAM NOF/U: HCPCS | Performed by: INTERNAL MEDICINE

## 2018-02-08 PROCEDURE — G8599 NO ASA/ANTIPLAT THER USE RNG: HCPCS | Performed by: INTERNAL MEDICINE

## 2018-02-08 PROCEDURE — 1123F ACP DISCUSS/DSCN MKR DOCD: CPT | Performed by: INTERNAL MEDICINE

## 2018-02-08 PROCEDURE — G8399 PT W/DXA RESULTS DOCUMENT: HCPCS | Performed by: INTERNAL MEDICINE

## 2018-02-08 PROCEDURE — G8427 DOCREV CUR MEDS BY ELIG CLIN: HCPCS | Performed by: INTERNAL MEDICINE

## 2018-02-08 RX ORDER — LANSOPRAZOLE 30 MG/1
CAPSULE, DELAYED RELEASE ORAL
Qty: 90 CAPSULE | Refills: 3 | Status: ON HOLD | OUTPATIENT
Start: 2018-02-08 | End: 2019-09-26 | Stop reason: HOSPADM

## 2018-02-08 ASSESSMENT — ENCOUNTER SYMPTOMS
ABDOMINAL PAIN: 0
CHEST TIGHTNESS: 0
SHORTNESS OF BREATH: 0
BLOOD IN STOOL: 0
WHEEZING: 0
VOMITING: 0
COUGH: 0
DIARRHEA: 0
NAUSEA: 0

## 2018-02-08 NOTE — PROGRESS NOTES
Subjective:      Patient ID: Sanjay Zurita is a 76 y.o. female. HPI  She is here for follow up of bipolar disorder,GERD,hyperlpidemia,microvascular CAD and urinary urge incontinence. Bipolar is stable. GERD is stable on meds. Takes Myrbetriq for urinary symptoms. Takes lipitor for hyperlipidemia.     C/o swelling left hand and forearm- few days. C/o pain in the epigastric region. not related to meals. No nausea,vomiting. On PPI daily. No NSAIDS    Review of Systems   Constitutional: Negative for fatigue, fever and unexpected weight change. Respiratory: Negative for cough, chest tightness, shortness of breath and wheezing. Cardiovascular: Negative for chest pain, palpitations and leg swelling. Gastrointestinal: Negative for abdominal pain, blood in stool, diarrhea, nausea and vomiting. Neurological: Negative for light-headedness. Objective:   Physical Exam   Constitutional: She is oriented to person, place, and time. She appears well-developed and well-nourished. HENT:   Head: Normocephalic and atraumatic. Eyes: Pupils are equal, round, and reactive to light. Neck: Normal range of motion. Neck supple. No thyromegaly present. Cardiovascular: Normal rate, regular rhythm and normal heart sounds. Exam reveals no gallop and no friction rub. No murmur heard. No carotid bruit   Pulmonary/Chest: Effort normal and breath sounds normal. No respiratory distress. She has no wheezes. She has no rales. She exhibits no tenderness. Abdominal: Soft. Bowel sounds are normal. She exhibits no distension and no mass. There is tenderness. There is no rebound (epigastric. umbilical hernia- fully reducible) and no guarding. Musculoskeletal: She exhibits no edema. LUE- swelling dorsum of hand extending to the forearm    No pain   Neurological: She is alert and oriented to person, place, and time. Assessment:      1. Essential hypertension     2. Mixed hyperlipidemia     3.  Urge urinary incontinence     4. Bipolar disorder, in full remission, most recent episode depressed ([address]     5. Gastroesophageal reflux disease without esophagitis     6. Swelling of left hand  Ultrasound doppler venous arm left   7. Epigastric pain  CBC Auto Differential    Comprehensive Metabolic Panel    LIPASE           Plan:        HTN- bp is good with out meds.      Lipids are good. Continue lipitor.       Continue symbyax for bipolar. LUE venous doppler    Epigastric pain. Unclear cause. unlikely related to reducible umbilical hernia  Labs as above. Continue PPI      GERD is stable. Continue prevacid.      Continue myrbetrig.   Urinary urge incontinence is stable

## 2018-02-21 ENCOUNTER — HOSPITAL ENCOUNTER (OUTPATIENT)
Dept: VASCULAR LAB | Age: 78
Discharge: HOME | End: 2018-02-21
Attending: INTERNAL MEDICINE | Admitting: INTERNAL MEDICINE

## 2018-02-21 DIAGNOSIS — M79.89 OTHER SPECIFIED SOFT TISSUE DISORDERS (CODE): ICD-10-CM

## 2018-02-26 RX ORDER — MIRABEGRON 25 MG/1
TABLET, FILM COATED, EXTENDED RELEASE ORAL
Qty: 90 TABLET | Refills: 0 | Status: SHIPPED | OUTPATIENT
Start: 2018-02-26 | End: 2018-05-16 | Stop reason: SDUPTHER

## 2018-03-14 ENCOUNTER — TELEPHONE (OUTPATIENT)
Dept: INTERNAL MEDICINE CLINIC | Age: 78
End: 2018-03-14

## 2018-04-04 RX ORDER — OLANZAPINE AND FLUOXETINE 6; 25 MG/1; MG/1
CAPSULE ORAL
Qty: 90 CAPSULE | Refills: 0 | Status: SHIPPED | OUTPATIENT
Start: 2018-04-04 | End: 2018-07-11 | Stop reason: SDUPTHER

## 2018-04-24 ENCOUNTER — CARE COORDINATION (OUTPATIENT)
Dept: CARE COORDINATION | Age: 78
End: 2018-04-24

## 2018-04-25 RX ORDER — ATORVASTATIN CALCIUM 20 MG/1
TABLET, FILM COATED ORAL
Qty: 90 TABLET | Refills: 0 | Status: ON HOLD | OUTPATIENT
Start: 2018-04-25 | End: 2018-07-02 | Stop reason: HOSPADM

## 2018-05-16 RX ORDER — MIRABEGRON 25 MG/1
TABLET, FILM COATED, EXTENDED RELEASE ORAL
Qty: 90 TABLET | Refills: 0 | Status: SHIPPED | OUTPATIENT
Start: 2018-05-16 | End: 2018-07-11 | Stop reason: SDUPTHER

## 2018-05-19 ENCOUNTER — CARE COORDINATION (OUTPATIENT)
Dept: CASE MANAGEMENT | Age: 78
End: 2018-05-19

## 2018-05-19 DIAGNOSIS — R50.9 FEVER, UNSPECIFIED FEVER CAUSE: Primary | ICD-10-CM

## 2018-05-19 PROCEDURE — 1111F DSCHRG MED/CURRENT MED MERGE: CPT

## 2018-05-22 ENCOUNTER — CARE COORDINATION (OUTPATIENT)
Dept: CASE MANAGEMENT | Age: 78
End: 2018-05-22

## 2018-05-29 ENCOUNTER — CARE COORDINATION (OUTPATIENT)
Dept: CASE MANAGEMENT | Age: 78
End: 2018-05-29

## 2018-06-06 ENCOUNTER — CARE COORDINATION (OUTPATIENT)
Dept: CARE COORDINATION | Age: 78
End: 2018-06-06

## 2018-06-08 ENCOUNTER — CARE COORDINATION (OUTPATIENT)
Dept: CARE COORDINATION | Age: 78
End: 2018-06-08

## 2018-06-28 PROBLEM — R10.13 EPIGASTRIC PAIN: Status: ACTIVE | Noted: 2018-06-28

## 2018-06-28 PROBLEM — E04.1 THYROID NODULE: Status: ACTIVE | Noted: 2018-06-28

## 2018-07-03 ENCOUNTER — CARE COORDINATION (OUTPATIENT)
Dept: CASE MANAGEMENT | Age: 78
End: 2018-07-03

## 2018-07-03 NOTE — CARE COORDINATION
[facility] Transitions Initial Follow Up Call    Call within 2 business days of discharge: Yes    Patient: Mindy Rosenberg Patient : 1940   MRN: [id number]  Reason for Admission: epigastric pain  Discharge Date: 18 RARS: Readmission Risk Score: 13    Facility: [address]    1st attempt to reach patient for post 24h discharge care transition call. Message left stating purpose of call, contact information and request for return call. Call to [facility] and notified them of patient's discharge to home yesterday. They will notify home visiting team and request discharge AVS faxed to ([phone]) which was done. Care transition outreach will continue.      Future Appointments  Date Time Provider Eleazar Queen   2018 3:30 PM Rod Jiang MD [facility]   2018 1:50 PM Domenica Ny MD [place] [facility] None       Diana Guidry, RN

## 2018-07-05 ENCOUNTER — OFFICE VISIT (OUTPATIENT)
Dept: CARDIOLOGY CLINIC | Age: 78
End: 2018-07-05

## 2018-07-05 VITALS
SYSTOLIC BLOOD PRESSURE: 134 MMHG | BODY MASS INDEX: 30.04 KG/M2 | WEIGHT: 153 LBS | HEART RATE: 84 BPM | OXYGEN SATURATION: 93 % | HEIGHT: 60 IN | DIASTOLIC BLOOD PRESSURE: 86 MMHG

## 2018-07-05 DIAGNOSIS — I10 ESSENTIAL HYPERTENSION: ICD-10-CM

## 2018-07-05 DIAGNOSIS — I25.89 CARDIAC MICROVASCULAR DISEASE: ICD-10-CM

## 2018-07-05 DIAGNOSIS — I25.119 CORONARY ARTERY DISEASE INVOLVING NATIVE CORONARY ARTERY OF NATIVE HEART WITH ANGINA PECTORIS (HCC): Primary | ICD-10-CM

## 2018-07-05 DIAGNOSIS — E78.2 MIXED HYPERLIPIDEMIA: ICD-10-CM

## 2018-07-05 PROCEDURE — 1036F TOBACCO NON-USER: CPT | Performed by: INTERNAL MEDICINE

## 2018-07-05 PROCEDURE — 1090F PRES/ABSN URINE INCON ASSESS: CPT | Performed by: INTERNAL MEDICINE

## 2018-07-05 PROCEDURE — G8419 CALC BMI OUT NRM PARAM NOF/U: HCPCS | Performed by: INTERNAL MEDICINE

## 2018-07-05 PROCEDURE — G8399 PT W/DXA RESULTS DOCUMENT: HCPCS | Performed by: INTERNAL MEDICINE

## 2018-07-05 PROCEDURE — 1123F ACP DISCUSS/DSCN MKR DOCD: CPT | Performed by: INTERNAL MEDICINE

## 2018-07-05 PROCEDURE — 1111F DSCHRG MED/CURRENT MED MERGE: CPT | Performed by: INTERNAL MEDICINE

## 2018-07-05 PROCEDURE — G8427 DOCREV CUR MEDS BY ELIG CLIN: HCPCS | Performed by: INTERNAL MEDICINE

## 2018-07-05 PROCEDURE — G8598 ASA/ANTIPLAT THER USED: HCPCS | Performed by: INTERNAL MEDICINE

## 2018-07-05 PROCEDURE — 99214 OFFICE O/P EST MOD 30 MIN: CPT | Performed by: INTERNAL MEDICINE

## 2018-07-05 PROCEDURE — 4040F PNEUMOC VAC/ADMIN/RCVD: CPT | Performed by: INTERNAL MEDICINE

## 2018-07-05 RX ORDER — NITROGLYCERIN 0.4 MG/1
0.4 TABLET SUBLINGUAL EVERY 5 MIN PRN
Qty: 90 TABLET | Refills: 0 | Status: ON HOLD | OUTPATIENT
Start: 2018-07-05 | End: 2019-09-26 | Stop reason: HOSPADM

## 2018-07-05 RX ORDER — METOPROLOL SUCCINATE 25 MG/1
25 TABLET, EXTENDED RELEASE ORAL DAILY
Qty: 30 TABLET | Refills: 6 | Status: SHIPPED | OUTPATIENT
Start: 2018-07-05 | End: 2018-07-11 | Stop reason: SDUPTHER

## 2018-07-05 NOTE — PROGRESS NOTES
[facility]   Cardiac Followup    Referring Provider:  Amilcar Corey MD     Chief Complaint   Patient presents with   [address] from [facility]     05/17-05/18/2018 & 06/28-07/02/2018, ekg 06/29/2018    Coronary Artery Disease    Hyperlipidemia    Hypertension    Results     echo 03/22/2017, anthony 05/18/2018, cta 06/28/2018    Discuss Labs     hep 07/02/2018 & lip 05/18/2018    Shortness of Breath    Dizziness     little at times    Fatigue        History of Present Illness:  Mrs. Nevaeh Domínguez is a [de-identified] female here for hospital f/u. Last OV with me was July 2016. She has PMH of HTN, mild NSTEMI and nonobstructive CAD, GERD, bipolar disorder, anxiety, and dementia. Prior admitted to [facility] 5/23/15 with onset of SSCP. Her troponin was peaked at 0.29. She subsequently underwent [facility] 5/23/15 which revealed LM <20% LAD 20% Cx <20% RCA <20% LV: mild global hypokinesis, EF 40-50%. Diagnosed with NSTEMI possibly due to microvascular disease. Most recent ECHO 5/25/15 EF 15% grade I diastolic dysfunction; Aortic valve appears sclerotic but opens adequately; Mild MR/AR/TR. She was started on cardiac medical regimen with no need for PCI. Today she presents to the office with her son. She follows up from 2 recent hospital stays. 5/17/18 and 6/28/18. She presented with chest pain and Dr. Collin Julian consulted. She r/o'd for MI. Most recent Lexiscan myoview 5/18/18 showed normal study, EF 60%. Most recent ECHO 3/22/17 EF 55%, Grade 1 DD, normal filling pressure. Most recent EKG 6/28/18 NSR, nonspecific ST change. She had an EGD on 6/28/18 by Dr. Halie Power which showed gastritis. She had severely elevated LFT's thought due to medication (ie. Tylenol). Note 6/19/18: ALT 1300 and AST 1500 (7/2/18:  and AST 94. She denies cp, sob, palpitations, dizziness or syncope. Son accompanied her to office today. Note she is NOT on statin due to recent severe LFT elevation.      Past Medical 0.7 06/29/2018    BUN 16 06/29/2018     06/29/2018    K 4.1 06/29/2018     06/29/2018    CO2 28 06/29/2018     Lab Results   Component Value Date     06/29/2018    K 4.1 06/29/2018     06/29/2018    CO2 28 06/29/2018    BUN 16 06/29/2018    CREATININE 0.7 06/29/2018    GLUCOSE 107 (H) 06/29/2018    CALCIUM 8.5 06/29/2018    PROT 7.2 07/02/2018    LABALBU 3.6 07/02/2018    BILITOT 0.5 07/02/2018    ALKPHOS 124 07/02/2018    AST 94 (H) 07/02/2018     (H) 07/02/2018    LABGLOM >60 06/29/2018    GFRAA >60 06/29/2018    AGRATIO 1.1 06/29/2018    GLOB 2.9 06/29/2018     Lab Results   Component Value Date    ALKPHOS 124 07/02/2018     07/02/2018    AST 94 07/02/2018    PROT 7.2 07/02/2018    BILITOT 0.5 07/02/2018    BILIDIR <0.2 07/02/2018    LABALBU 3.6 07/02/2018          Assessment:     1. NSTEMI (non-ST elevated myocardial infarction) [facility]):  She underwent most recent [facility] 5/23/15 which revealed LM <20% LAD 20% Cx <20% RCA <20%. Diagnosed with NSTEMI possibly due to microvascular disease. Most recent ECHO 5/25/15 EF 67% grade I diastolic dysfunction; AV sclerosis; mild MR/AR/TR. Note most recent Lexiscan myoview 5/18/18 showed normal study, EF 60%. There are no concerning symptoms for angina currently and CP felt secondary to gastritis found on EGD recently along with liver issues (severely elevated LFT's medication related but improving). Would suggest low dose 10mg lipitor once LFT's improved and no concerning liver issue. She is NOT on statin due to recent severe LFT elevation. 2. HTN (hypertension): Stable and will continue present medical regimen. 3. S/P cardiac cath [facility] 5/23/15: See #1 above. Plan:  1. Restart toprol XL 25 mg daily. Will give in evening before going to bed due to son being afraid of lowering her BP too much. 2. Discuss with PCP regarding restarting cholesterol mediation, due to abnormal liver function numbers.   3. Should be taking Protonix and Carafate for stomach. Not they have NOT filled scripts and I encouraged them to do so. 4. Follow up with NP in 3 months  5. Follow up with me in 6 months    Cost of prescription medications and patient compliance have been reviewed with patient. All questions answered. Thank you for allowing me to participate in the care of this individual.    Sarkis White.  Domenica Chowdhury M.D., [facility]

## 2018-07-09 NOTE — CARE COORDINATION
[facility] Transitions Initial Follow Up Call    Call within 2 business days of discharge: No    Patient: Howard Workman Patient : 1940   MRN: [id number]  Reason for Admission: abd pain  Discharge Date: 18 RARS: Readmission Risk Score: 13    Facility: [facility]    3rd/FINAL attempt to reach patient for post 24h discharge care transition call. Message left stating purpose of call, contact information and request for return call.      Follow Up  Future Appointments  Date Time Provider Eleazar Queen   2018 1:50 PM MD [facility] None   10/10/2018 12:30 PM David [place]ley APRN - CNP P [facility] Pineda RN

## 2018-07-11 ENCOUNTER — OFFICE VISIT (OUTPATIENT)
Dept: INTERNAL MEDICINE CLINIC | Age: 78
End: 2018-07-11

## 2018-07-11 VITALS
HEART RATE: 76 BPM | SYSTOLIC BLOOD PRESSURE: 122 MMHG | WEIGHT: 157 LBS | HEIGHT: 62 IN | BODY MASS INDEX: 28.89 KG/M2 | DIASTOLIC BLOOD PRESSURE: 78 MMHG

## 2018-07-11 DIAGNOSIS — R79.89 ELEVATED LFTS: ICD-10-CM

## 2018-07-11 DIAGNOSIS — R13.10 DYSPHAGIA, UNSPECIFIED TYPE: ICD-10-CM

## 2018-07-11 DIAGNOSIS — I10 ESSENTIAL HYPERTENSION: ICD-10-CM

## 2018-07-11 DIAGNOSIS — R10.9 ABDOMINAL PAIN, UNSPECIFIED ABDOMINAL LOCATION: Primary | ICD-10-CM

## 2018-07-11 LAB
ALBUMIN SERPL-MCNC: 4 G/DL (ref 3.4–5)
ALP BLD-CCNC: 98 U/L (ref 40–129)
ALT SERPL-CCNC: 57 U/L (ref 10–40)
AST SERPL-CCNC: 23 U/L (ref 15–37)
BILIRUB SERPL-MCNC: 0.3 MG/DL (ref 0–1)
BILIRUBIN DIRECT: <0.2 MG/DL (ref 0–0.3)
BILIRUBIN, INDIRECT: ABNORMAL MG/DL (ref 0–1)
TOTAL PROTEIN: 6.7 G/DL (ref 6.4–8.2)

## 2018-07-11 PROCEDURE — 1111F DSCHRG MED/CURRENT MED MERGE: CPT | Performed by: INTERNAL MEDICINE

## 2018-07-11 PROCEDURE — 99495 TRANSJ CARE MGMT MOD F2F 14D: CPT | Performed by: INTERNAL MEDICINE

## 2018-07-11 RX ORDER — METOPROLOL SUCCINATE 25 MG/1
25 TABLET, EXTENDED RELEASE ORAL DAILY
Qty: 90 TABLET | Refills: 0 | Status: ON HOLD | OUTPATIENT
Start: 2018-07-11 | End: 2018-11-20

## 2018-07-11 RX ORDER — OLANZAPINE AND FLUOXETINE 6; 25 MG/1; MG/1
CAPSULE ORAL
Qty: 90 CAPSULE | Refills: 0 | Status: ON HOLD | OUTPATIENT
Start: 2018-07-11 | End: 2019-09-26 | Stop reason: HOSPADM

## 2018-07-25 NOTE — COMMUNICATION BODY
traMADol (ULTRAM) 50 MG tablet Take 1 tablet by mouth every 12 hours as needed for Pain . 11/15/17  Yes Lila Millan MD   nitroGLYCERIN (NITROSTAT) 0.4 MG SL tablet Place 1 tablet under the tongue every 5 minutes as needed for Chest pain 11/14/17  Yes Swetha Spencer MD   polyethylene glycol (GLYCOLAX) powder POUR 17GM OF POWDER INTO   MEASURING CAP STIR INTO 8OZOF WATER AND DRINK DAILY 7/3/17  Yes Lila Millan MD   calcium carbonate 600 MG TABS tablet Take 1 tablet by mouth daily 7/6/15  Yes Argelia Mcnulty MD   Multiple Vitamins-Minerals (THERAPEUTIC MULTIVITAMIN-MINERALS) tablet Take 1 tablet by mouth daily 7/13/15  Yes Argelia Mcnulty MD   Hypromellose (GENTEAL MILD TO MODERATE) 0.3 % SOLN Apply 1 drop to eye as needed 1-3 drops in both eyes prn   Yes Historical Provider, MD   fish oil-omega-3 fatty acids 1000 MG capsule Take 1 g by mouth daily    Yes Historical Provider, MD        Allergies: Wellbutrin [bupropion hcl]; Advil [ibuprofen micronized]; Bactrim; Erythromycin; Flexeril [cyclobenzaprine hcl]; and Guaifenesin er     Review of Systems:   · Constitutional: there has been no unanticipated weight loss. There's been no change in energy level, sleep pattern, or activity level. · Eyes: No visual changes or diplopia. No scleral icterus. · ENT: No Headaches, hearing loss or vertigo. No mouth sores or sore throat. · Cardiovascular: Reviewed in HPI  · Respiratory: No cough or wheezing, no sputum production. No hematemesis. · Gastrointestinal: No abdominal pain, appetite loss, blood in stools. No change in bowel or bladder habits. · Genitourinary: No dysuria, trouble voiding, or hematuria. · Musculoskeletal:  No gait disturbance, weakness or joint complaints. · Integumentary: No rash or pruritis. · Neurological: No headache, diplopia, change in muscle strength, numbness or tingling.  No change in gait, balance, coordination, mood, affect, memory, mentation, taking Protonix and Carafate for stomach. Not they have NOT filled scripts and I encouraged them to do so. 4. Follow up with NP in 3 months  5. Follow up with me in 6 months    Cost of prescription medications and patient compliance have been reviewed with patient. All questions answered. Thank you for allowing me to participate in the care of this individual.    Cirilo Bailey.  Sarai Mccauley M.D., [facility] [place]

## 2018-07-30 ENCOUNTER — CARE COORDINATION (OUTPATIENT)
Dept: CARE COORDINATION | Age: 78
End: 2018-07-30

## 2018-08-08 ENCOUNTER — TELEPHONE (OUTPATIENT)
Dept: INTERNAL MEDICINE CLINIC | Age: 78
End: 2018-08-08

## 2018-08-21 ENCOUNTER — OFFICE VISIT (OUTPATIENT)
Dept: INTERNAL MEDICINE CLINIC | Age: 78
End: 2018-08-21

## 2018-08-21 VITALS
SYSTOLIC BLOOD PRESSURE: 122 MMHG | HEIGHT: 62 IN | HEART RATE: 72 BPM | DIASTOLIC BLOOD PRESSURE: 80 MMHG | WEIGHT: 160 LBS | BODY MASS INDEX: 29.44 KG/M2

## 2018-08-21 DIAGNOSIS — M15.9 PRIMARY OSTEOARTHRITIS INVOLVING MULTIPLE JOINTS: ICD-10-CM

## 2018-08-21 DIAGNOSIS — I10 ESSENTIAL HYPERTENSION: Primary | ICD-10-CM

## 2018-08-21 DIAGNOSIS — E78.2 MIXED HYPERLIPIDEMIA: ICD-10-CM

## 2018-08-21 DIAGNOSIS — I25.119 CORONARY ARTERY DISEASE INVOLVING NATIVE CORONARY ARTERY OF NATIVE HEART WITH ANGINA PECTORIS (HCC): ICD-10-CM

## 2018-08-21 DIAGNOSIS — K21.9 GASTROESOPHAGEAL REFLUX DISEASE WITHOUT ESOPHAGITIS: ICD-10-CM

## 2018-08-21 PROCEDURE — G8419 CALC BMI OUT NRM PARAM NOF/U: HCPCS | Performed by: INTERNAL MEDICINE

## 2018-08-21 PROCEDURE — 4040F PNEUMOC VAC/ADMIN/RCVD: CPT | Performed by: INTERNAL MEDICINE

## 2018-08-21 PROCEDURE — G8399 PT W/DXA RESULTS DOCUMENT: HCPCS | Performed by: INTERNAL MEDICINE

## 2018-08-21 PROCEDURE — 1090F PRES/ABSN URINE INCON ASSESS: CPT | Performed by: INTERNAL MEDICINE

## 2018-08-21 PROCEDURE — 1101F PT FALLS ASSESS-DOCD LE1/YR: CPT | Performed by: INTERNAL MEDICINE

## 2018-08-21 PROCEDURE — G8428 CUR MEDS NOT DOCUMENT: HCPCS | Performed by: INTERNAL MEDICINE

## 2018-08-21 PROCEDURE — 99214 OFFICE O/P EST MOD 30 MIN: CPT | Performed by: INTERNAL MEDICINE

## 2018-08-21 PROCEDURE — G8598 ASA/ANTIPLAT THER USED: HCPCS | Performed by: INTERNAL MEDICINE

## 2018-08-21 PROCEDURE — 1036F TOBACCO NON-USER: CPT | Performed by: INTERNAL MEDICINE

## 2018-08-21 PROCEDURE — 1123F ACP DISCUSS/DSCN MKR DOCD: CPT | Performed by: INTERNAL MEDICINE

## 2018-08-21 ASSESSMENT — ENCOUNTER SYMPTOMS
NAUSEA: 0
CHEST TIGHTNESS: 0
COUGH: 0
VOMITING: 0
DIARRHEA: 0
BLOOD IN STOOL: 0
ABDOMINAL PAIN: 0
SHORTNESS OF BREATH: 0
WHEEZING: 0

## 2018-08-21 NOTE — PROGRESS NOTES
microvascular. Continue toprol,ASA    OA. Continue tramadol. Tylenol prn  Voltaren gel      Lipids are good. Continue lipitor.       Continue symbyax for bipolar.      GERD is stable. Continue prevacid.      Continue myrbetrig.   Urinary urge incontinence is stable        Sahyla Frances MD

## 2018-08-27 ENCOUNTER — TELEPHONE (OUTPATIENT)
Dept: INTERNAL MEDICINE CLINIC | Age: 78
End: 2018-08-27

## 2018-08-27 NOTE — TELEPHONE ENCOUNTER
Gianna NP informed per dr. Hardeep Vaughn that pt taking Tramdol 50mg 1 in am and 2 in PM. Tylenol prn. No meloxicam, no narcotics.  Gianna to prescribe the tramadol to [facility]

## 2018-08-27 NOTE — TELEPHONE ENCOUNTER
----- Message from Gregory Lewis MD sent at 8/27/2018 11:00 AM EDT -----  Contact: pt [phone]  Son did not tell me that she is getting from different doctor  ----- Message -----  From: Efra Arndtin  Sent: 8/27/2018  10:51 AM  To: Gregory Lewis MD    Last 2 fills were by Dr. Aurora Carson - address for doctor [facility]. Last filled 6/9/18 for 90 day supply. Please advise.  ----- Message -----  From: Isreal Person  Sent: 8/27/2018  10:09 AM  To:  Nuzhat Clemente    Pt needs refill traMADol (ULTRAM) 50 MG tablet   [facility]  Last appt 8/21/18  Next appt 10/17/18    ks

## 2018-09-27 ENCOUNTER — TELEPHONE (OUTPATIENT)
Dept: INTERNAL MEDICINE CLINIC | Age: 78
End: 2018-09-27

## 2018-09-27 NOTE — TELEPHONE ENCOUNTER
Pt called Dr. Jeane Garcia office requesting refill on Tramadol. Pt is not treated by Dr. Corey Cain and all meds prescribed by Dr. Nereyda Palacio spoke to Ele Bueno NP for patient, patient has new PCP. Gianna will contact patient to get things straightened out.

## 2018-10-10 ENCOUNTER — HOSPITAL ENCOUNTER (OUTPATIENT)
Age: 78
Discharge: HOME | End: 2018-10-10
Payer: MEDICARE

## 2018-10-10 ENCOUNTER — HOSPITAL ENCOUNTER (OUTPATIENT)
Dept: GENERAL RADIOLOGY | Age: 78
Discharge: HOME | End: 2018-10-10
Payer: MEDICARE

## 2018-10-10 DIAGNOSIS — M25.511 RIGHT SHOULDER PAIN, UNSPECIFIED CHRONICITY: ICD-10-CM

## 2018-10-10 DIAGNOSIS — M17.0 BILATERAL PRIMARY OSTEOARTHRITIS OF KNEE: ICD-10-CM

## 2018-10-10 PROCEDURE — 73030 X-RAY EXAM OF SHOULDER: CPT

## 2018-10-10 PROCEDURE — 73560 X-RAY EXAM OF KNEE 1 OR 2: CPT

## 2018-11-20 ENCOUNTER — APPOINTMENT (OUTPATIENT)
Dept: GENERAL RADIOLOGY | Age: 78
End: 2018-11-20
Payer: MEDICARE

## 2018-11-20 ENCOUNTER — HOSPITAL ENCOUNTER (OUTPATIENT)
Age: 78
Setting detail: OBSERVATION
Discharge: HOME | End: 2018-11-20
Attending: EMERGENCY MEDICINE | Admitting: INTERNAL MEDICINE
Payer: MEDICARE

## 2018-11-20 ENCOUNTER — APPOINTMENT (OUTPATIENT)
Dept: CT IMAGING | Age: 78
End: 2018-11-20
Payer: MEDICARE

## 2018-11-20 VITALS
WEIGHT: 150 LBS | RESPIRATION RATE: 19 BRPM | BODY MASS INDEX: 27.44 KG/M2 | SYSTOLIC BLOOD PRESSURE: 166 MMHG | OXYGEN SATURATION: 96 % | TEMPERATURE: 97 F | DIASTOLIC BLOOD PRESSURE: 97 MMHG | HEART RATE: 80 BPM

## 2018-11-20 DIAGNOSIS — I16.0 HYPERTENSIVE URGENCY: Primary | ICD-10-CM

## 2018-11-20 LAB
A/G RATIO: 1.2 (ref 1.1–2.2)
ALBUMIN SERPL-MCNC: 4.3 G/DL (ref 3.4–5)
ALP BLD-CCNC: 93 U/L (ref 40–129)
ALT SERPL-CCNC: 30 U/L (ref 10–40)
AMORPHOUS: ABNORMAL /HPF
ANION GAP SERPL CALCULATED.3IONS-SCNC: 9 MMOL/L (ref 3–16)
APTT: 31.4 SEC (ref 26–36)
AST SERPL-CCNC: 37 U/L (ref 15–37)
BASOPHILS ABSOLUTE: 0 K/UL (ref 0–0.2)
BASOPHILS RELATIVE PERCENT: 0.4 %
BILIRUB SERPL-MCNC: 0.7 MG/DL (ref 0–1)
BILIRUBIN URINE: NEGATIVE
BLOOD, URINE: ABNORMAL
BUN BLDV-MCNC: 22 MG/DL (ref 7–20)
CALCIUM SERPL-MCNC: 9.6 MG/DL (ref 8.3–10.6)
CHLORIDE BLD-SCNC: 101 MMOL/L (ref 99–110)
CLARITY: CLEAR
CO2: 27 MMOL/L (ref 21–32)
COLOR: YELLOW
CREAT SERPL-MCNC: 0.7 MG/DL (ref 0.6–1.2)
EKG ATRIAL RATE: 80 BPM
EKG ATRIAL RATE: 81 BPM
EKG DIAGNOSIS: NORMAL
EKG DIAGNOSIS: NORMAL
EKG P AXIS: -23 DEGREES
EKG P AXIS: 57 DEGREES
EKG P-R INTERVAL: 172 MS
EKG P-R INTERVAL: 172 MS
EKG Q-T INTERVAL: 386 MS
EKG Q-T INTERVAL: 392 MS
EKG QRS DURATION: 84 MS
EKG QRS DURATION: 84 MS
EKG QTC CALCULATION (BAZETT): 445 MS
EKG QTC CALCULATION (BAZETT): 455 MS
EKG R AXIS: -8 DEGREES
EKG R AXIS: -8 DEGREES
EKG T AXIS: 28 DEGREES
EKG T AXIS: 34 DEGREES
EKG VENTRICULAR RATE: 80 BPM
EKG VENTRICULAR RATE: 81 BPM
EOSINOPHILS ABSOLUTE: 0 K/UL (ref 0–0.6)
EOSINOPHILS RELATIVE PERCENT: 0 %
EPITHELIAL CELLS, UA: ABNORMAL /HPF
GFR AFRICAN AMERICAN: >60
GFR NON-AFRICAN AMERICAN: >60
GLOBULIN: 3.5 G/DL
GLUCOSE BLD-MCNC: 101 MG/DL (ref 70–99)
GLUCOSE URINE: NEGATIVE MG/DL
HCT VFR BLD CALC: 39.8 % (ref 36–48)
HEMOGLOBIN: 13.5 G/DL (ref 12–16)
INR BLD: 1.03 (ref 0.86–1.14)
KETONES, URINE: NEGATIVE MG/DL
LEUKOCYTE ESTERASE, URINE: NEGATIVE
LYMPHOCYTES ABSOLUTE: 0.8 K/UL (ref 1–5.1)
LYMPHOCYTES RELATIVE PERCENT: 10.6 %
MCH RBC QN AUTO: 31.8 PG (ref 26–34)
MCHC RBC AUTO-ENTMCNC: 34 G/DL (ref 31–36)
MCV RBC AUTO: 93.6 FL (ref 80–100)
MICROSCOPIC EXAMINATION: YES
MONOCYTES ABSOLUTE: 0.4 K/UL (ref 0–1.3)
MONOCYTES RELATIVE PERCENT: 5.5 %
NEUTROPHILS ABSOLUTE: 6.7 K/UL (ref 1.7–7.7)
NEUTROPHILS RELATIVE PERCENT: 83.5 %
NITRITE, URINE: NEGATIVE
PDW BLD-RTO: 14.6 % (ref 12.4–15.4)
PH UA: 7.5
PLATELET # BLD: 180 K/UL (ref 135–450)
PMV BLD AUTO: 7 FL (ref 5–10.5)
POTASSIUM REFLEX MAGNESIUM: 4.1 MMOL/L (ref 3.5–5.1)
PRO-BNP: 405 PG/ML (ref 0–449)
PROTEIN UA: NEGATIVE MG/DL
PROTHROMBIN TIME: 11.7 SEC (ref 9.8–13)
RBC # BLD: 4.25 M/UL (ref 4–5.2)
RBC UA: ABNORMAL /HPF (ref 0–2)
RENAL EPITHELIAL, UA: ABNORMAL /HPF
SODIUM BLD-SCNC: 137 MMOL/L (ref 136–145)
SPECIFIC GRAVITY UA: 1.01
TOTAL PROTEIN: 7.8 G/DL (ref 6.4–8.2)
TROPONIN: <0.01 NG/ML
URINE TYPE: ABNORMAL
UROBILINOGEN, URINE: 1 E.U./DL
WBC # BLD: 8 K/UL (ref 4–11)
WBC UA: ABNORMAL /HPF (ref 0–5)

## 2018-11-20 PROCEDURE — 93010 ELECTROCARDIOGRAM REPORT: CPT | Performed by: INTERNAL MEDICINE

## 2018-11-20 PROCEDURE — 6370000000 HC RX 637 (ALT 250 FOR IP): Performed by: INTERNAL MEDICINE

## 2018-11-20 PROCEDURE — 36415 COLL VENOUS BLD VENIPUNCTURE: CPT

## 2018-11-20 PROCEDURE — G0378 HOSPITAL OBSERVATION PER HR: HCPCS

## 2018-11-20 PROCEDURE — 99219 PR INITIAL OBSERVATION CARE/DAY 50 MINUTES: CPT | Performed by: INTERNAL MEDICINE

## 2018-11-20 PROCEDURE — 71045 X-RAY EXAM CHEST 1 VIEW: CPT

## 2018-11-20 PROCEDURE — 93005 ELECTROCARDIOGRAM TRACING: CPT | Performed by: INTERNAL MEDICINE

## 2018-11-20 PROCEDURE — 85730 THROMBOPLASTIN TIME PARTIAL: CPT

## 2018-11-20 PROCEDURE — 6360000002 HC RX W HCPCS: Performed by: INTERNAL MEDICINE

## 2018-11-20 PROCEDURE — 96372 THER/PROPH/DIAG INJ SC/IM: CPT

## 2018-11-20 PROCEDURE — 90686 IIV4 VACC NO PRSV 0.5 ML IM: CPT | Performed by: INTERNAL MEDICINE

## 2018-11-20 PROCEDURE — 93005 ELECTROCARDIOGRAM TRACING: CPT | Performed by: EMERGENCY MEDICINE

## 2018-11-20 PROCEDURE — 84484 ASSAY OF TROPONIN QUANT: CPT

## 2018-11-20 PROCEDURE — 2580000003 HC RX 258: Performed by: INTERNAL MEDICINE

## 2018-11-20 PROCEDURE — G0008 ADMIN INFLUENZA VIRUS VAC: HCPCS | Performed by: INTERNAL MEDICINE

## 2018-11-20 PROCEDURE — 81001 URINALYSIS AUTO W/SCOPE: CPT

## 2018-11-20 PROCEDURE — 99285 EMERGENCY DEPT VISIT HI MDM: CPT

## 2018-11-20 PROCEDURE — 70450 CT HEAD/BRAIN W/O DYE: CPT

## 2018-11-20 PROCEDURE — 83880 ASSAY OF NATRIURETIC PEPTIDE: CPT

## 2018-11-20 PROCEDURE — 6370000000 HC RX 637 (ALT 250 FOR IP): Performed by: PHYSICIAN ASSISTANT

## 2018-11-20 PROCEDURE — 2500000003 HC RX 250 WO HCPCS: Performed by: EMERGENCY MEDICINE

## 2018-11-20 PROCEDURE — 80053 COMPREHEN METABOLIC PANEL: CPT

## 2018-11-20 PROCEDURE — 96374 THER/PROPH/DIAG INJ IV PUSH: CPT

## 2018-11-20 PROCEDURE — 85025 COMPLETE CBC W/AUTO DIFF WBC: CPT

## 2018-11-20 PROCEDURE — 85610 PROTHROMBIN TIME: CPT

## 2018-11-20 RX ORDER — PANTOPRAZOLE SODIUM 40 MG/1
40 TABLET, DELAYED RELEASE ORAL
Status: DISCONTINUED | OUTPATIENT
Start: 2018-11-20 | End: 2018-11-20 | Stop reason: HOSPADM

## 2018-11-20 RX ORDER — METOPROLOL SUCCINATE 50 MG/1
50 TABLET, EXTENDED RELEASE ORAL DAILY
Status: DISCONTINUED | OUTPATIENT
Start: 2018-11-21 | End: 2018-11-20

## 2018-11-20 RX ORDER — ONDANSETRON 2 MG/ML
4 INJECTION INTRAMUSCULAR; INTRAVENOUS EVERY 6 HOURS PRN
Status: DISCONTINUED | OUTPATIENT
Start: 2018-11-20 | End: 2018-11-20 | Stop reason: HOSPADM

## 2018-11-20 RX ORDER — MELOXICAM 15 MG/1
15 TABLET ORAL DAILY
Refills: 2 | COMMUNITY
Start: 2018-10-24 | End: 2019-09-20

## 2018-11-20 RX ORDER — AMLODIPINE BESYLATE 2.5 MG/1
2.5 TABLET ORAL DAILY
Qty: 30 TABLET | Refills: 3 | Status: ON HOLD | OUTPATIENT
Start: 2018-11-20 | End: 2019-09-26 | Stop reason: HOSPADM

## 2018-11-20 RX ORDER — AMLODIPINE BESYLATE 2.5 MG/1
2.5 TABLET ORAL DAILY
Status: DISCONTINUED | OUTPATIENT
Start: 2018-11-20 | End: 2018-11-20 | Stop reason: HOSPADM

## 2018-11-20 RX ORDER — PHENOL 1.4 %
10 AEROSOL, SPRAY (ML) MUCOUS MEMBRANE NIGHTLY PRN
Status: ON HOLD | COMMUNITY
Start: 2018-09-17 | End: 2019-09-26 | Stop reason: HOSPADM

## 2018-11-20 RX ORDER — ACETAMINOPHEN 325 MG/1
650 TABLET ORAL EVERY 4 HOURS PRN
Status: DISCONTINUED | OUTPATIENT
Start: 2018-11-20 | End: 2018-11-20 | Stop reason: HOSPADM

## 2018-11-20 RX ORDER — METOPROLOL SUCCINATE 25 MG/1
25 TABLET, EXTENDED RELEASE ORAL DAILY
Status: DISCONTINUED | OUTPATIENT
Start: 2018-11-20 | End: 2018-11-20

## 2018-11-20 RX ORDER — SODIUM CHLORIDE 0.9 % (FLUSH) 0.9 %
10 SYRINGE (ML) INJECTION PRN
Status: DISCONTINUED | OUTPATIENT
Start: 2018-11-20 | End: 2018-11-20 | Stop reason: HOSPADM

## 2018-11-20 RX ORDER — ASPIRIN 81 MG/1
81 TABLET ORAL DAILY
Status: DISCONTINUED | OUTPATIENT
Start: 2018-11-20 | End: 2018-11-20 | Stop reason: HOSPADM

## 2018-11-20 RX ORDER — METOPROLOL SUCCINATE 50 MG/1
50 TABLET, EXTENDED RELEASE ORAL DAILY
Qty: 30 TABLET | Refills: 2 | Status: ON HOLD | OUTPATIENT
Start: 2018-11-20 | End: 2019-09-26 | Stop reason: HOSPADM

## 2018-11-20 RX ORDER — ASPIRIN 81 MG/1
81 TABLET ORAL DAILY
Status: DISCONTINUED | OUTPATIENT
Start: 2018-11-20 | End: 2018-11-20 | Stop reason: SDUPTHER

## 2018-11-20 RX ORDER — CYCLOBENZAPRINE HCL 10 MG
10 TABLET ORAL NIGHTLY
Refills: 2 | Status: ON HOLD | COMMUNITY
Start: 2018-10-24 | End: 2019-09-26 | Stop reason: HOSPADM

## 2018-11-20 RX ORDER — ATORVASTATIN CALCIUM 20 MG/1
1 TABLET, FILM COATED ORAL NIGHTLY
Status: ON HOLD | COMMUNITY
Start: 2018-09-17 | End: 2019-09-26 | Stop reason: HOSPADM

## 2018-11-20 RX ORDER — ACETAMINOPHEN 325 MG/1
TABLET ORAL
Status: DISCONTINUED
Start: 2018-11-20 | End: 2018-11-20 | Stop reason: HOSPADM

## 2018-11-20 RX ORDER — NITROGLYCERIN 0.4 MG/1
0.4 TABLET SUBLINGUAL EVERY 5 MIN PRN
Status: DISCONTINUED | OUTPATIENT
Start: 2018-11-20 | End: 2018-11-20 | Stop reason: HOSPADM

## 2018-11-20 RX ORDER — OLANZAPINE AND FLUOXETINE 6; 25 MG/1; MG/1
1 CAPSULE ORAL NIGHTLY
Status: DISCONTINUED | OUTPATIENT
Start: 2018-11-20 | End: 2018-11-20 | Stop reason: HOSPADM

## 2018-11-20 RX ORDER — HYDROCODONE BITARTRATE AND ACETAMINOPHEN 5; 325 MG/1; MG/1
1 TABLET ORAL EVERY 6 HOURS PRN
Status: ON HOLD | COMMUNITY
End: 2019-06-20 | Stop reason: HOSPADM

## 2018-11-20 RX ORDER — METOPROLOL SUCCINATE 50 MG/1
50 TABLET, EXTENDED RELEASE ORAL DAILY
Status: DISCONTINUED | OUTPATIENT
Start: 2018-11-20 | End: 2018-11-20 | Stop reason: HOSPADM

## 2018-11-20 RX ORDER — SODIUM CHLORIDE 0.9 % (FLUSH) 0.9 %
10 SYRINGE (ML) INJECTION EVERY 12 HOURS SCHEDULED
Status: DISCONTINUED | OUTPATIENT
Start: 2018-11-20 | End: 2018-11-20 | Stop reason: HOSPADM

## 2018-11-20 RX ADMIN — ACETAMINOPHEN 650 MG: 325 TABLET ORAL at 15:50

## 2018-11-20 RX ADMIN — AMLODIPINE BESYLATE 2.5 MG: 2.5 TABLET ORAL at 16:38

## 2018-11-20 RX ADMIN — LABETALOL HYDROCHLORIDE 20 MG: 5 INJECTION, SOLUTION INTRAVENOUS at 05:03

## 2018-11-20 RX ADMIN — Medication 10 ML: at 09:59

## 2018-11-20 RX ADMIN — ASPIRIN 81 MG: 81 TABLET, COATED ORAL at 10:00

## 2018-11-20 RX ADMIN — ENOXAPARIN SODIUM 40 MG: 40 INJECTION SUBCUTANEOUS at 10:00

## 2018-11-20 RX ADMIN — PANTOPRAZOLE SODIUM 40 MG: 40 TABLET, DELAYED RELEASE ORAL at 09:59

## 2018-11-20 RX ADMIN — METOPROLOL SUCCINATE 50 MG: 50 TABLET, EXTENDED RELEASE ORAL at 10:20

## 2018-11-20 RX ADMIN — INFLUENZA A VIRUS A/MICHIGAN/45/2015 X-275 (H1N1) ANTIGEN (FORMALDEHYDE INACTIVATED), INFLUENZA A VIRUS A/SINGAPORE/INFIMH-16-0019/2016 IVR-186 (H3N2) ANTIGEN (FORMALDEHYDE INACTIVATED), INFLUENZA B VIRUS B/PHUKET/3073/2013 ANTIGEN (FORMALDEHYDE INACTIVATED), AND INFLUENZA B VIRUS B/MARYLAND/15/2016 BX-69A ANTIGEN (FORMALDEHYDE INACTIVATED) 0.5 ML: 15; 15; 15; 15 INJECTION, SUSPENSION INTRAMUSCULAR at 16:38

## 2018-11-20 ASSESSMENT — PAIN DESCRIPTION - LOCATION: LOCATION: HEAD

## 2018-11-20 ASSESSMENT — PAIN SCALES - GENERAL
PAINLEVEL_OUTOF10: 2
PAINLEVEL_OUTOF10: 4

## 2018-11-20 ASSESSMENT — PAIN DESCRIPTION - PAIN TYPE: TYPE: ACUTE PAIN

## 2018-11-20 ASSESSMENT — PAIN DESCRIPTION - DESCRIPTORS: DESCRIPTORS: HEADACHE

## 2018-11-20 ASSESSMENT — PAIN SCALES - WONG BAKER: WONGBAKER_NUMERICALRESPONSE: 0

## 2018-11-20 ASSESSMENT — PAIN DESCRIPTION - ONSET: ONSET: GRADUAL

## 2018-11-20 ASSESSMENT — PAIN DESCRIPTION - FREQUENCY: FREQUENCY: INTERMITTENT

## 2018-11-20 NOTE — PROGRESS NOTES
Pt c/o headache- no PRN medications available. Dr. Loera Living notified and states he will place an order for Tylenol.

## 2018-11-20 NOTE — ED PROVIDER NOTES
[address] ED  eMERGENCY dEPARTMENT eNCOUnter      Pt Name: Ishan Frances  MRN: [id number]  Armsrosegfcarter 1940  Date of evaluation: 11/20/2018  Provider: Mary Bell MD  PCP: Moise Boateng MD      CHIEF COMPLAINT       Chief Complaint   Patient presents with    Hypertension     pt's son called EMS for HTN; pt's son stated that pt got up to go to the bathroom and \"just wasn't feeling good\"  so he took her B/P       HISTORY OFPRESENT ILLNESS   (Location/Symptom, Timing/Onset, Context/Setting, Quality, Duration, Modifying Factors,Severity)  Note limiting factors. Ishan Frances is a [de-identified] y.o. female  Here for evaluation of high blood pressure she woke up in the middle the night and said she wasn't feeling good her son had to help her get to the bathroom normally she is ambulatory she denies any Focal weakness she has a history of high blood pressure and does take a beta blocker for that she denied any headache chest pain or shortness of breath she does appear somewhat confused. Nursing Notes were all reviewed and agreed with or any disagreements were addressed  in the HPI. REVIEW OF SYSTEMS    (2-9 systems for level 4, 10 or more for level 5)     Review of Systems    Positives and Pertinent negatives as per HPI. Except as noted above in the ROS, all other systems were reviewed andnegative.        PASTMEDICAL HISTORY     Past Medical History:   Diagnosis Date    Arthritis     Bipolar disorder ([address]     Depression     Diverticulosis     GERD (gastroesophageal reflux disease)     Hyperlipidemia     Hypertension     NSTEMI (non-ST elevated myocardial infarction) ([address] 5/23/15    suspect microvasc dzDr. Doe Graft       Past Surgical History:   Procedure Laterality Date    CATARACT REMOVAL WITH IMPLANT Left 5/14/2014    CHOLECYSTECTOMY      COLON SURGERY  1991    COLONOSCOPY  07/02/2015    diverticulosis    CORONARY All EKG's are interpreted by the Emergency Department Physician who eithersigns or Co-signs this chart in the absence of a cardiologist.    The Ekg interpreted by me in the absence of a cardiologist shows. Normal Sinus rhythm   Rate of   80  Axis is   Normal  QTc is  within an acceptable range  Intervals and Durations are unremarkable. Nonspecific ST-T wave changes appreciated. No evidence of acute ischemia. RADIOLOGY:   Non-plain film images such as CT, Ultrasound and MRI are read by the radiologist. Plain radiographic images are visualized by myself. *    Interpretation per the Radiologist below, if available at the time of this note:    CT Head WO Contrast   Final Result   No acute intracranial abnormality. XR CHEST PORTABLE   Final Result   Cardiomegaly is similar to prior exams and may reflect a combination of   cardiomegaly and pericardial effusion, as seen on CT from 06/28/2018. Bronchial wall thickening may represent mild edema versus airway inflammation. Questionable left pleural effusion. PROCEDURES   Unless otherwise noted below, none     Procedures    *    CRITICAL CARE TIME   N/A      EMERGENCY DEPARTMENT COURSE and DIFFERENTIALDIAGNOSIS/MDM:   Vitals:    Vitals:    11/20/18 0415 11/20/18 0438 11/20/18 0502   BP: (!) 194/103  (!) 192/86   Pulse: 79  77   Resp: 18  15   Temp: 98.3 °F (36.8 °C)     TempSrc: Oral     SpO2: 100%     Weight:  150 lb (68 kg)        Patient was given thefollowing medications:  Medications   labetalol (NORMODYNE;TRANDATE) injection 20 mg (20 mg Intravenous Given 11/20/18 0503)           The patient tolerated their visit well. The patient and / or the familywere informed of the results of any tests, a time was given to answer questions. FINAL IMPRESSION      1.  Hypertensive urgency        Plan will be for admission and management of her blood pressure and observation of her mental status  DISPOSITION/PLAN   DISPOSITION

## 2018-11-20 NOTE — DISCHARGE INSTR - DIET

## 2018-11-20 NOTE — PROGRESS NOTES
Dr. Annette Killian at the bedside to examine pt. See progress note for details. Recheck troponin at 1200. Increase Toprol XL to 50 mg po daily and give this morning. General diet.

## 2018-11-20 NOTE — H&P
SURGERY         Medications Prior to Admission:    Prior to Admission medications    Medication Sig Start Date End Date Taking? Authorizing Provider   HYDROcodone-acetaminophen (NORCO) 5-325 MG per tablet Take 1 tablet by mouth every 6 hours as needed for Pain. .   Yes Historical Provider, MD   diclofenac sodium (VOLTAREN) 1 % GEL Apply 2 g topically 4 times daily 8/21/18  Yes Hershal Cabot, MD   Mirabegron ER (MYRBETRIQ) 25 MG TB24 TAKE 1 TABLET DAILY 7/11/18  Yes Hershal Cabot, MD   OLANZapine-FLUoxetine (SYMBYAX) 6-25 MG CAPS capsule TAKE 1 CAPSULE EVERY       EVENING 7/11/18  Yes Hershal Cabot, MD   metoprolol succinate (TOPROL XL) 25 MG extended release tablet Take 1 tablet by mouth daily 7/11/18  Yes Hershal Cabot, MD   nitroGLYCERIN (NITROSTAT) 0.4 MG SL tablet Place 1 tablet under the tongue every 5 minutes as needed for Chest pain 7/5/18  Yes Rob Craig MD   aspirin EC 81 MG EC tablet Take 1 tablet by mouth daily With food 5/18/18  Yes Saeid Dudley MD   lansoprazole (PREVACID) 30 MG delayed release capsule TAKE 1 CAPSULE DAILY.  2/8/18  Yes Hershal Cabot, MD   ferrous sulfate 325 (65 Fe) MG tablet Take 325 mg by mouth daily (with breakfast)   Yes Historical Provider, MD   methylcellulose (CITRUCEL) 500 MG TABS Take 1 tablet by mouth daily   Yes Historical Provider, MD   polyethylene glycol (GLYCOLAX) powder POUR 17GM OF POWDER INTO   MEASURING CAP STIR INTO 8OZOF WATER AND DRINK DAILY 7/3/17  Yes Hershal Cabot, MD   calcium carbonate 600 MG TABS tablet Take 1 tablet by mouth daily 7/6/15  Yes Rosibel Rankin MD   Multiple Vitamins-Minerals (THERAPEUTIC MULTIVITAMIN-MINERALS) tablet Take 1 tablet by mouth daily 7/13/15  Yes Rosibel Rankin MD   fish oil-omega-3 fatty acids 1000 MG capsule Take 1 g by mouth daily    Yes Historical Provider, MD   traMADol (ULTRAM) 50 MG tablet Take 1 tablet by mouth every 12 hours as needed inflammation. Questionable left pleural effusion. Pertinent previous results reviewed     Stress test 5/18/18      Conclusions        Summary    There is normal perfusion at rest and stress. There is no stress induced    ischemia or infarct.    There are no regional wall motion abnormalities.    Normal left ventricular systolic function with ejection fraction of 60 %.    This is a normal myocardial perfusion study. Unruly Tidwell have reviewed the chart on [facility] and personally interviewed and examined patient, reviewed the data (labs and imaging) and after discussion with my PA formulated the plan. Agree with note with the following edits. HPI:     Patient is an 70-year-old white female who came to the emergency room. She woke up in the middle night and apparently did not feel good. She had no focal weakness. No slurred speech. No chest pain or shortness of breath. Apparently she was somewhat confused. She is brought to the emergency room. In the emergency room her initial blood pressure systolic 027 and diastolic 883. She is admitted to the hospital for observation. This morning she had nonspecific chest pain. EKG and troponin were both negative. Patient had a normal stress test in May 2018. She had an abnormal stress test in August 2017. She had an angiogram in 2015. Plan I saw the patient she was awake and alert. She is oriented ×4. Denied any chest pain. She has not received her blood pressure medicines this morning. Her blood pressure is already down but not at goal.    I reviewed the patient's Past Medical History, Past Surgical History, Medications, and Allergies. Physical exam:    BP (!) 171/87   Pulse 82   Temp 98.7 °F (37.1 °C) (Oral)   Resp 18   Wt 150 lb (68 kg)   SpO2 96%   BMI 27.44 kg/m²     Gen: No distress. Alert. Eyes: PERRL. No sclera icterus. No conjunctival injection. ENT: No discharge. Pharynx clear.    Neck: Trachea

## 2018-11-20 NOTE — PROGRESS NOTES
Pt started c/o chest pain to previous nurse around 0700. Orders placed by Roberto Carlos Duff RN for EKG and troponin. Both WNL. VSS. Charge nurse in to assess pt, at this time pt was confused, but stated chest pain has resolved and 'it's probably my anxiety, this happens sometimes\". MD notified. No new orders.

## 2018-11-20 NOTE — PROGRESS NOTES
Shift assessment completed, see flow sheet. Morning medications given. Pt a/o x 4, denies any pain or shortness of breath. Rhythm NSR-82, /87, SpO2 96% on RA. Respirations are easy, even, and unlabored. Bilateral lung sounds diminished. Trace edema to BLE. PIV WNL. Pt denies any needs at this time. Call light and bedside table within reach. Bed alarm on. Tele-sitter at bedside for pt's safety. Will continue to monitor.

## 2019-06-18 ENCOUNTER — APPOINTMENT (OUTPATIENT)
Dept: CT IMAGING | Age: 79
DRG: 481 | End: 2019-06-18
Payer: MEDICARE

## 2019-06-18 ENCOUNTER — APPOINTMENT (OUTPATIENT)
Dept: GENERAL RADIOLOGY | Age: 79
DRG: 481 | End: 2019-06-18
Payer: MEDICARE

## 2019-06-18 ENCOUNTER — HOSPITAL ENCOUNTER (OUTPATIENT)
Dept: GENERAL RADIOLOGY | Age: 79
Discharge: HOME | End: 2019-06-19

## 2019-06-18 ENCOUNTER — ANESTHESIA EVENT (OUTPATIENT)
Dept: OPERATING ROOM | Age: 79
DRG: 481 | End: 2019-06-18
Payer: MEDICARE

## 2019-06-18 ENCOUNTER — ANESTHESIA (OUTPATIENT)
Dept: OPERATING ROOM | Age: 79
DRG: 481 | End: 2019-06-18
Payer: MEDICARE

## 2019-06-18 ENCOUNTER — HOSPITAL ENCOUNTER (INPATIENT)
Age: 79
LOS: 3 days | Discharge: NURSING, REHAB OR LONG-TERM CARE | DRG: 481 | End: 2019-06-21
Attending: EMERGENCY MEDICINE | Admitting: INTERNAL MEDICINE
Payer: MEDICARE

## 2019-06-18 VITALS
SYSTOLIC BLOOD PRESSURE: 90 MMHG | DIASTOLIC BLOOD PRESSURE: 51 MMHG | OXYGEN SATURATION: 98 % | RESPIRATION RATE: 5 BRPM

## 2019-06-18 DIAGNOSIS — Z98.890 STATUS POST-OPERATIVE REPAIR OF CLOSED HIP FRACTURE: ICD-10-CM

## 2019-06-18 DIAGNOSIS — Z87.81 STATUS POST-OPERATIVE REPAIR OF CLOSED HIP FRACTURE: ICD-10-CM

## 2019-06-18 DIAGNOSIS — S72.21XA CLOSED DISPLACED SUBTROCHANTERIC FRACTURE OF RIGHT FEMUR, INITIAL ENCOUNTER (HCC): Primary | ICD-10-CM

## 2019-06-18 PROBLEM — S72.91XA CLOSED FRACTURE OF RIGHT FEMUR (HCC): Status: ACTIVE | Noted: 2019-06-18

## 2019-06-18 LAB
ABO/RH: NORMAL
ALBUMIN SERPL-MCNC: 3.7 G/DL (ref 3.4–5)
ALP BLD-CCNC: 77 U/L (ref 40–129)
ALT SERPL-CCNC: 36 U/L (ref 10–40)
ANION GAP SERPL CALCULATED.3IONS-SCNC: 11 MMOL/L (ref 3–16)
ANTIBODY IDENTIFICATION: NORMAL
ANTIBODY IDENTIFICATION: NORMAL
ANTIBODY SCREEN: NORMAL
APTT: 28.2 SEC (ref 26–36)
AST SERPL-CCNC: 33 U/L (ref 15–37)
BASOPHILS ABSOLUTE: 0 K/UL (ref 0–0.2)
BASOPHILS RELATIVE PERCENT: 0.2 %
BILIRUB SERPL-MCNC: 0.4 MG/DL (ref 0–1)
BILIRUBIN DIRECT: <0.2 MG/DL (ref 0–0.3)
BILIRUBIN, INDIRECT: NORMAL MG/DL (ref 0–1)
BUN BLDV-MCNC: 31 MG/DL (ref 7–20)
CALCIUM SERPL-MCNC: 9.4 MG/DL (ref 8.3–10.6)
CHLORIDE BLD-SCNC: 100 MMOL/L (ref 99–110)
CO2: 26 MMOL/L (ref 21–32)
CREAT SERPL-MCNC: 0.8 MG/DL (ref 0.6–1.2)
DAT IGG CAPTURE: NORMAL
EKG ATRIAL RATE: 66 BPM
EKG DIAGNOSIS: NORMAL
EKG P-R INTERVAL: 170 MS
EKG Q-T INTERVAL: 424 MS
EKG QRS DURATION: 80 MS
EKG QTC CALCULATION (BAZETT): 444 MS
EKG R AXIS: -14 DEGREES
EKG T AXIS: 37 DEGREES
EKG VENTRICULAR RATE: 66 BPM
EOSINOPHILS ABSOLUTE: 0 K/UL (ref 0–0.6)
EOSINOPHILS RELATIVE PERCENT: 0 %
GFR AFRICAN AMERICAN: >60
GFR NON-AFRICAN AMERICAN: >60
GLUCOSE BLD-MCNC: 119 MG/DL (ref 70–99)
HCT VFR BLD CALC: 34.3 % (ref 36–48)
HEMOGLOBIN: 11.6 G/DL (ref 12–16)
INR BLD: 1 (ref 0.86–1.14)
LIPASE: 32 U/L (ref 13–60)
LYMPHOCYTES ABSOLUTE: 1.5 K/UL (ref 1–5.1)
LYMPHOCYTES RELATIVE PERCENT: 15.9 %
MCH RBC QN AUTO: 32.3 PG (ref 26–34)
MCHC RBC AUTO-ENTMCNC: 33.9 G/DL (ref 31–36)
MCV RBC AUTO: 95.3 FL (ref 80–100)
MONOCYTES ABSOLUTE: 0.8 K/UL (ref 0–1.3)
MONOCYTES RELATIVE PERCENT: 8.4 %
NEUTROPHILS ABSOLUTE: 7.1 K/UL (ref 1.7–7.7)
NEUTROPHILS RELATIVE PERCENT: 75.5 %
PDW BLD-RTO: 13.4 % (ref 12.4–15.4)
PLATELET # BLD: 203 K/UL (ref 135–450)
PMV BLD AUTO: 7.2 FL (ref 5–10.5)
POTASSIUM REFLEX MAGNESIUM: 4.6 MMOL/L (ref 3.5–5.1)
PROTHROMBIN TIME: 11.4 SEC (ref 9.8–13)
RBC # BLD: 3.6 M/UL (ref 4–5.2)
SODIUM BLD-SCNC: 137 MMOL/L (ref 136–145)
TOTAL PROTEIN: 6.6 G/DL (ref 6.4–8.2)
WBC # BLD: 9.4 K/UL (ref 4–11)

## 2019-06-18 PROCEDURE — 2580000003 HC RX 258: Performed by: PHYSICIAN ASSISTANT

## 2019-06-18 PROCEDURE — 73552 X-RAY EXAM OF FEMUR 2/>: CPT

## 2019-06-18 PROCEDURE — 99285 EMERGENCY DEPT VISIT HI MDM: CPT

## 2019-06-18 PROCEDURE — 2580000003 HC RX 258: Performed by: ORTHOPAEDIC SURGERY

## 2019-06-18 PROCEDURE — 2580000003 HC RX 258: Performed by: ANESTHESIOLOGY

## 2019-06-18 PROCEDURE — 6360000002 HC RX W HCPCS: Performed by: ANESTHESIOLOGY

## 2019-06-18 PROCEDURE — 70450 CT HEAD/BRAIN W/O DYE: CPT

## 2019-06-18 PROCEDURE — 6370000000 HC RX 637 (ALT 250 FOR IP): Performed by: ORTHOPAEDIC SURGERY

## 2019-06-18 PROCEDURE — 74177 CT ABD & PELVIS W/CONTRAST: CPT

## 2019-06-18 PROCEDURE — 6360000002 HC RX W HCPCS: Performed by: PHYSICIAN ASSISTANT

## 2019-06-18 PROCEDURE — 85025 COMPLETE CBC W/AUTO DIFF WBC: CPT

## 2019-06-18 PROCEDURE — 86850 RBC ANTIBODY SCREEN: CPT

## 2019-06-18 PROCEDURE — 2780000010 HC IMPLANT OTHER: Performed by: ORTHOPAEDIC SURGERY

## 2019-06-18 PROCEDURE — 6360000002 HC RX W HCPCS: Performed by: EMERGENCY MEDICINE

## 2019-06-18 PROCEDURE — C1776 JOINT DEVICE (IMPLANTABLE): HCPCS | Performed by: ORTHOPAEDIC SURGERY

## 2019-06-18 PROCEDURE — 6360000002 HC RX W HCPCS: Performed by: NURSE PRACTITIONER

## 2019-06-18 PROCEDURE — 96374 THER/PROPH/DIAG INJ IV PUSH: CPT

## 2019-06-18 PROCEDURE — 85610 PROTHROMBIN TIME: CPT

## 2019-06-18 PROCEDURE — 3700000000 HC ANESTHESIA ATTENDED CARE: Performed by: ORTHOPAEDIC SURGERY

## 2019-06-18 PROCEDURE — 6360000004 HC RX CONTRAST MEDICATION: Performed by: EMERGENCY MEDICINE

## 2019-06-18 PROCEDURE — 86870 RBC ANTIBODY IDENTIFICATION: CPT

## 2019-06-18 PROCEDURE — 2709999900 HC NON-CHARGEABLE SUPPLY: Performed by: ORTHOPAEDIC SURGERY

## 2019-06-18 PROCEDURE — 86901 BLOOD TYPING SEROLOGIC RH(D): CPT

## 2019-06-18 PROCEDURE — 86900 BLOOD TYPING SEROLOGIC ABO: CPT

## 2019-06-18 PROCEDURE — 93010 ELECTROCARDIOGRAM REPORT: CPT | Performed by: INTERNAL MEDICINE

## 2019-06-18 PROCEDURE — 94761 N-INVAS EAR/PLS OXIMETRY MLT: CPT

## 2019-06-18 PROCEDURE — 2500000003 HC RX 250 WO HCPCS: Performed by: ANESTHESIOLOGY

## 2019-06-18 PROCEDURE — 3700000001 HC ADD 15 MINUTES (ANESTHESIA): Performed by: ORTHOPAEDIC SURGERY

## 2019-06-18 PROCEDURE — 73502 X-RAY EXAM HIP UNI 2-3 VIEWS: CPT

## 2019-06-18 PROCEDURE — 86880 COOMBS TEST DIRECT: CPT

## 2019-06-18 PROCEDURE — 85730 THROMBOPLASTIN TIME PARTIAL: CPT

## 2019-06-18 PROCEDURE — 2720000010 HC SURG SUPPLY STERILE: Performed by: ORTHOPAEDIC SURGERY

## 2019-06-18 PROCEDURE — 99223 1ST HOSP IP/OBS HIGH 75: CPT | Performed by: INTERNAL MEDICINE

## 2019-06-18 PROCEDURE — C1769 GUIDE WIRE: HCPCS | Performed by: ORTHOPAEDIC SURGERY

## 2019-06-18 PROCEDURE — 80076 HEPATIC FUNCTION PANEL: CPT

## 2019-06-18 PROCEDURE — 80048 BASIC METABOLIC PNL TOTAL CA: CPT

## 2019-06-18 PROCEDURE — 83690 ASSAY OF LIPASE: CPT

## 2019-06-18 PROCEDURE — 1200000000 HC SEMI PRIVATE

## 2019-06-18 PROCEDURE — 3600000014 HC SURGERY LEVEL 4 ADDTL 15MIN: Performed by: ORTHOPAEDIC SURGERY

## 2019-06-18 PROCEDURE — 96376 TX/PRO/DX INJ SAME DRUG ADON: CPT

## 2019-06-18 PROCEDURE — 0QS606Z REPOSITION RIGHT UPPER FEMUR WITH INTRAMEDULLARY INTERNAL FIXATION DEVICE, OPEN APPROACH: ICD-10-PCS | Performed by: ORTHOPAEDIC SURGERY

## 2019-06-18 PROCEDURE — 2700000000 HC OXYGEN THERAPY PER DAY

## 2019-06-18 PROCEDURE — 76942 ECHO GUIDE FOR BIOPSY: CPT | Performed by: ANESTHESIOLOGY

## 2019-06-18 PROCEDURE — 86902 BLOOD TYPE ANTIGEN DONOR EA: CPT

## 2019-06-18 PROCEDURE — C1713 ANCHOR/SCREW BN/BN,TIS/BN: HCPCS | Performed by: ORTHOPAEDIC SURGERY

## 2019-06-18 PROCEDURE — 7100000000 HC PACU RECOVERY - FIRST 15 MIN: Performed by: ORTHOPAEDIC SURGERY

## 2019-06-18 PROCEDURE — 3600000004 HC SURGERY LEVEL 4 BASE: Performed by: ORTHOPAEDIC SURGERY

## 2019-06-18 PROCEDURE — 93005 ELECTROCARDIOGRAM TRACING: CPT | Performed by: EMERGENCY MEDICINE

## 2019-06-18 PROCEDURE — 86922 COMPATIBILITY TEST ANTIGLOB: CPT

## 2019-06-18 PROCEDURE — 7100000001 HC PACU RECOVERY - ADDTL 15 MIN: Performed by: ORTHOPAEDIC SURGERY

## 2019-06-18 DEVICE — CABLE SURG DIA1.7MM S STL HA CERCLAGE W/ CRMP 29880101S] DEPUY SYNTHES USA]: Type: IMPLANTABLE DEVICE | Site: HIP | Status: FUNCTIONAL

## 2019-06-18 DEVICE — NAIL IM L380MM DIA11MM 130DEG LNG R PROX FEM GRN TI CANN: Type: IMPLANTABLE DEVICE | Site: HIP | Status: FUNCTIONAL

## 2019-06-18 DEVICE — IMPLANTABLE DEVICE: Type: IMPLANTABLE DEVICE | Site: HIP | Status: FUNCTIONAL

## 2019-06-18 RX ORDER — OLANZAPINE 5 MG/1
5 TABLET ORAL NIGHTLY
Status: DISCONTINUED | OUTPATIENT
Start: 2019-06-18 | End: 2019-06-21 | Stop reason: HOSPADM

## 2019-06-18 RX ORDER — MAGNESIUM HYDROXIDE 1200 MG/15ML
LIQUID ORAL CONTINUOUS PRN
Status: COMPLETED | OUTPATIENT
Start: 2019-06-18 | End: 2019-06-18

## 2019-06-18 RX ORDER — ACETAMINOPHEN 325 MG/1
650 TABLET ORAL EVERY 4 HOURS PRN
Status: DISCONTINUED | OUTPATIENT
Start: 2019-06-18 | End: 2019-06-21 | Stop reason: HOSPADM

## 2019-06-18 RX ORDER — PROMETHAZINE HYDROCHLORIDE 25 MG/ML
6.25 INJECTION, SOLUTION INTRAMUSCULAR; INTRAVENOUS
Status: DISCONTINUED | OUTPATIENT
Start: 2019-06-18 | End: 2019-06-18 | Stop reason: HOSPADM

## 2019-06-18 RX ORDER — MORPHINE SULFATE 4 MG/ML
2 INJECTION, SOLUTION INTRAMUSCULAR; INTRAVENOUS ONCE
Status: COMPLETED | OUTPATIENT
Start: 2019-06-18 | End: 2019-06-18

## 2019-06-18 RX ORDER — HYDROMORPHONE HCL 110MG/55ML
0.25 PATIENT CONTROLLED ANALGESIA SYRINGE INTRAVENOUS EVERY 5 MIN PRN
Status: DISCONTINUED | OUTPATIENT
Start: 2019-06-18 | End: 2019-06-18 | Stop reason: HOSPADM

## 2019-06-18 RX ORDER — OXYCODONE HYDROCHLORIDE AND ACETAMINOPHEN 5; 325 MG/1; MG/1
2 TABLET ORAL PRN
Status: DISCONTINUED | OUTPATIENT
Start: 2019-06-18 | End: 2019-06-18 | Stop reason: HOSPADM

## 2019-06-18 RX ORDER — MEPERIDINE HYDROCHLORIDE 50 MG/ML
12.5 INJECTION INTRAMUSCULAR; INTRAVENOUS; SUBCUTANEOUS EVERY 5 MIN PRN
Status: DISCONTINUED | OUTPATIENT
Start: 2019-06-18 | End: 2019-06-18 | Stop reason: HOSPADM

## 2019-06-18 RX ORDER — DIPHENHYDRAMINE HYDROCHLORIDE 50 MG/ML
12.5 INJECTION INTRAMUSCULAR; INTRAVENOUS
Status: DISCONTINUED | OUTPATIENT
Start: 2019-06-18 | End: 2019-06-18 | Stop reason: HOSPADM

## 2019-06-18 RX ORDER — ONDANSETRON 2 MG/ML
4 INJECTION INTRAMUSCULAR; INTRAVENOUS
Status: DISCONTINUED | OUTPATIENT
Start: 2019-06-18 | End: 2019-06-18 | Stop reason: HOSPADM

## 2019-06-18 RX ORDER — MORPHINE SULFATE 10 MG/ML
2 INJECTION, SOLUTION INTRAMUSCULAR; INTRAVENOUS EVERY 5 MIN PRN
Status: DISCONTINUED | OUTPATIENT
Start: 2019-06-18 | End: 2019-06-18 | Stop reason: HOSPADM

## 2019-06-18 RX ORDER — PANTOPRAZOLE SODIUM 40 MG/1
40 TABLET, DELAYED RELEASE ORAL
Status: DISCONTINUED | OUTPATIENT
Start: 2019-06-19 | End: 2019-06-21 | Stop reason: HOSPADM

## 2019-06-18 RX ORDER — ASPIRIN 81 MG/1
81 TABLET ORAL DAILY
Status: DISCONTINUED | OUTPATIENT
Start: 2019-06-19 | End: 2019-06-21 | Stop reason: HOSPADM

## 2019-06-18 RX ORDER — FLUOXETINE HYDROCHLORIDE 20 MG/1
20 CAPSULE ORAL NIGHTLY
Status: DISCONTINUED | OUTPATIENT
Start: 2019-06-18 | End: 2019-06-21 | Stop reason: HOSPADM

## 2019-06-18 RX ORDER — FERROUS SULFATE 325(65) MG
325 TABLET ORAL
Status: DISCONTINUED | OUTPATIENT
Start: 2019-06-19 | End: 2019-06-21 | Stop reason: HOSPADM

## 2019-06-18 RX ORDER — SENNA AND DOCUSATE SODIUM 50; 8.6 MG/1; MG/1
2 TABLET, FILM COATED ORAL DAILY
Status: DISCONTINUED | OUTPATIENT
Start: 2019-06-18 | End: 2019-06-21 | Stop reason: HOSPADM

## 2019-06-18 RX ORDER — 0.9 % SODIUM CHLORIDE 0.9 %
500 INTRAVENOUS SOLUTION INTRAVENOUS ONCE
Status: COMPLETED | OUTPATIENT
Start: 2019-06-19 | End: 2019-06-19

## 2019-06-18 RX ORDER — LORAZEPAM 2 MG/ML
0.5 INJECTION INTRAMUSCULAR ONCE
Status: COMPLETED | OUTPATIENT
Start: 2019-06-18 | End: 2019-06-18

## 2019-06-18 RX ORDER — CHOLECALCIFEROL (VITAMIN D3) 125 MCG
5 CAPSULE ORAL NIGHTLY PRN
Status: DISCONTINUED | OUTPATIENT
Start: 2019-06-18 | End: 2019-06-21 | Stop reason: HOSPADM

## 2019-06-18 RX ORDER — POLYETHYLENE GLYCOL 3350 17 G/17G
17 POWDER, FOR SOLUTION ORAL DAILY
Status: DISCONTINUED | OUTPATIENT
Start: 2019-06-18 | End: 2019-06-21 | Stop reason: HOSPADM

## 2019-06-18 RX ORDER — ATORVASTATIN CALCIUM 10 MG/1
20 TABLET, FILM COATED ORAL NIGHTLY
Status: DISCONTINUED | OUTPATIENT
Start: 2019-06-18 | End: 2019-06-21 | Stop reason: HOSPADM

## 2019-06-18 RX ORDER — LIDOCAINE HYDROCHLORIDE 20 MG/ML
INJECTION, SOLUTION INFILTRATION; PERINEURAL PRN
Status: DISCONTINUED | OUTPATIENT
Start: 2019-06-18 | End: 2019-06-18 | Stop reason: SDUPTHER

## 2019-06-18 RX ORDER — OMEGA-3-ACID ETHYL ESTERS 1 G/1
1 CAPSULE, LIQUID FILLED ORAL DAILY
Status: DISCONTINUED | OUTPATIENT
Start: 2019-06-19 | End: 2019-06-21 | Stop reason: HOSPADM

## 2019-06-18 RX ORDER — HYDRALAZINE HYDROCHLORIDE 20 MG/ML
5 INJECTION INTRAMUSCULAR; INTRAVENOUS EVERY 10 MIN PRN
Status: DISCONTINUED | OUTPATIENT
Start: 2019-06-18 | End: 2019-06-18 | Stop reason: HOSPADM

## 2019-06-18 RX ORDER — MORPHINE SULFATE 10 MG/ML
1 INJECTION, SOLUTION INTRAMUSCULAR; INTRAVENOUS EVERY 5 MIN PRN
Status: DISCONTINUED | OUTPATIENT
Start: 2019-06-18 | End: 2019-06-18 | Stop reason: HOSPADM

## 2019-06-18 RX ORDER — OLANZAPINE AND FLUOXETINE 6; 25 MG/1; MG/1
1 CAPSULE ORAL NIGHTLY
Status: DISCONTINUED | OUTPATIENT
Start: 2019-06-18 | End: 2019-06-18 | Stop reason: CLARIF

## 2019-06-18 RX ORDER — HYDROMORPHONE HCL 110MG/55ML
0.5 PATIENT CONTROLLED ANALGESIA SYRINGE INTRAVENOUS EVERY 5 MIN PRN
Status: DISCONTINUED | OUTPATIENT
Start: 2019-06-18 | End: 2019-06-18 | Stop reason: HOSPADM

## 2019-06-18 RX ORDER — LABETALOL HYDROCHLORIDE 5 MG/ML
5 INJECTION, SOLUTION INTRAVENOUS EVERY 10 MIN PRN
Status: DISCONTINUED | OUTPATIENT
Start: 2019-06-18 | End: 2019-06-18 | Stop reason: HOSPADM

## 2019-06-18 RX ORDER — PROPOFOL 10 MG/ML
INJECTION, EMULSION INTRAVENOUS PRN
Status: DISCONTINUED | OUTPATIENT
Start: 2019-06-18 | End: 2019-06-18 | Stop reason: SDUPTHER

## 2019-06-18 RX ORDER — CEFAZOLIN SODIUM 1 G/3ML
1 INJECTION, POWDER, FOR SOLUTION INTRAMUSCULAR; INTRAVENOUS EVERY 8 HOURS
Status: DISCONTINUED | OUTPATIENT
Start: 2019-06-18 | End: 2019-06-18 | Stop reason: SDUPTHER

## 2019-06-18 RX ORDER — SODIUM CHLORIDE, SODIUM LACTATE, POTASSIUM CHLORIDE, CALCIUM CHLORIDE 600; 310; 30; 20 MG/100ML; MG/100ML; MG/100ML; MG/100ML
INJECTION, SOLUTION INTRAVENOUS CONTINUOUS PRN
Status: DISCONTINUED | OUTPATIENT
Start: 2019-06-18 | End: 2019-06-18 | Stop reason: SDUPTHER

## 2019-06-18 RX ORDER — EPHEDRINE SULFATE 50 MG/ML
INJECTION, SOLUTION INTRAVENOUS PRN
Status: DISCONTINUED | OUTPATIENT
Start: 2019-06-18 | End: 2019-06-18 | Stop reason: SDUPTHER

## 2019-06-18 RX ORDER — CALCIUM CARBONATE 500(1250)
1 TABLET ORAL DAILY
Status: DISCONTINUED | OUTPATIENT
Start: 2019-06-19 | End: 2019-06-21 | Stop reason: HOSPADM

## 2019-06-18 RX ORDER — SODIUM CHLORIDE 0.9 % (FLUSH) 0.9 %
10 SYRINGE (ML) INJECTION PRN
Status: DISCONTINUED | OUTPATIENT
Start: 2019-06-18 | End: 2019-06-21 | Stop reason: HOSPADM

## 2019-06-18 RX ORDER — ONDANSETRON 2 MG/ML
4 INJECTION INTRAMUSCULAR; INTRAVENOUS EVERY 6 HOURS PRN
Status: DISCONTINUED | OUTPATIENT
Start: 2019-06-18 | End: 2019-06-21 | Stop reason: HOSPADM

## 2019-06-18 RX ORDER — SODIUM CHLORIDE 9 MG/ML
INJECTION, SOLUTION INTRAVENOUS CONTINUOUS PRN
Status: DISCONTINUED | OUTPATIENT
Start: 2019-06-18 | End: 2019-06-18 | Stop reason: SDUPTHER

## 2019-06-18 RX ORDER — CALCIUM POLYCARBOPHIL 625 MG 625 MG/1
625 TABLET ORAL DAILY
Status: DISCONTINUED | OUTPATIENT
Start: 2019-06-19 | End: 2019-06-21 | Stop reason: HOSPADM

## 2019-06-18 RX ORDER — SODIUM CHLORIDE 9 MG/ML
INJECTION, SOLUTION INTRAVENOUS CONTINUOUS
Status: DISCONTINUED | OUTPATIENT
Start: 2019-06-18 | End: 2019-06-21 | Stop reason: HOSPADM

## 2019-06-18 RX ORDER — HYDROCODONE BITARTRATE AND ACETAMINOPHEN 5; 325 MG/1; MG/1
1 TABLET ORAL EVERY 6 HOURS PRN
Status: DISCONTINUED | OUTPATIENT
Start: 2019-06-18 | End: 2019-06-20

## 2019-06-18 RX ORDER — AMLODIPINE BESYLATE 2.5 MG/1
2.5 TABLET ORAL DAILY
Status: DISCONTINUED | OUTPATIENT
Start: 2019-06-19 | End: 2019-06-21 | Stop reason: HOSPADM

## 2019-06-18 RX ORDER — OXYCODONE HYDROCHLORIDE AND ACETAMINOPHEN 5; 325 MG/1; MG/1
1 TABLET ORAL PRN
Status: DISCONTINUED | OUTPATIENT
Start: 2019-06-18 | End: 2019-06-18 | Stop reason: HOSPADM

## 2019-06-18 RX ORDER — MORPHINE SULFATE 4 MG/ML
2 INJECTION, SOLUTION INTRAMUSCULAR; INTRAVENOUS
Status: DISCONTINUED | OUTPATIENT
Start: 2019-06-18 | End: 2019-06-20

## 2019-06-18 RX ORDER — SODIUM CHLORIDE 0.9 % (FLUSH) 0.9 %
10 SYRINGE (ML) INJECTION EVERY 12 HOURS SCHEDULED
Status: DISCONTINUED | OUTPATIENT
Start: 2019-06-18 | End: 2019-06-21 | Stop reason: HOSPADM

## 2019-06-18 RX ORDER — METOPROLOL SUCCINATE 50 MG/1
50 TABLET, EXTENDED RELEASE ORAL DAILY
Status: DISCONTINUED | OUTPATIENT
Start: 2019-06-18 | End: 2019-06-21 | Stop reason: HOSPADM

## 2019-06-18 RX ORDER — BUPIVACAINE HYDROCHLORIDE 2.5 MG/ML
INJECTION, SOLUTION EPIDURAL; INFILTRATION; INTRACAUDAL PRN
Status: DISCONTINUED | OUTPATIENT
Start: 2019-06-18 | End: 2019-06-18 | Stop reason: SDUPTHER

## 2019-06-18 RX ADMIN — SODIUM CHLORIDE: 9 INJECTION, SOLUTION INTRAVENOUS at 16:23

## 2019-06-18 RX ADMIN — HYDROMORPHONE HYDROCHLORIDE 0.25 MG: 2 INJECTION, SOLUTION INTRAMUSCULAR; INTRAVENOUS; SUBCUTANEOUS at 20:22

## 2019-06-18 RX ADMIN — PROPOFOL 110 MG: 10 INJECTION, EMULSION INTRAVENOUS at 18:04

## 2019-06-18 RX ADMIN — Medication 2 G: at 17:48

## 2019-06-18 RX ADMIN — MORPHINE SULFATE 2 MG: 4 INJECTION INTRAVENOUS at 16:23

## 2019-06-18 RX ADMIN — LORAZEPAM 0.5 MG: 2 INJECTION INTRAMUSCULAR; INTRAVENOUS at 15:05

## 2019-06-18 RX ADMIN — EPHEDRINE SULFATE 20 MG: 50 INJECTION, SOLUTION INTRAMUSCULAR; INTRAVENOUS; SUBCUTANEOUS at 18:39

## 2019-06-18 RX ADMIN — MORPHINE SULFATE 2 MG: 4 INJECTION INTRAVENOUS at 12:54

## 2019-06-18 RX ADMIN — BUPIVACAINE HYDROCHLORIDE 30 ML: 2.5 INJECTION, SOLUTION EPIDURAL; INFILTRATION; INTRACAUDAL; PERINEURAL at 17:55

## 2019-06-18 RX ADMIN — OLANZAPINE 5 MG: 5 TABLET, FILM COATED ORAL at 21:41

## 2019-06-18 RX ADMIN — EPHEDRINE SULFATE 20 MG: 50 INJECTION, SOLUTION INTRAMUSCULAR; INTRAVENOUS; SUBCUTANEOUS at 18:19

## 2019-06-18 RX ADMIN — SODIUM CHLORIDE, POTASSIUM CHLORIDE, SODIUM LACTATE AND CALCIUM CHLORIDE: 600; 310; 30; 20 INJECTION, SOLUTION INTRAVENOUS at 18:40

## 2019-06-18 RX ADMIN — PHENYLEPHRINE HYDROCHLORIDE 100 MCG: 10 INJECTION INTRAVENOUS at 18:51

## 2019-06-18 RX ADMIN — SODIUM CHLORIDE: 9 INJECTION, SOLUTION INTRAVENOUS at 18:01

## 2019-06-18 RX ADMIN — ATORVASTATIN CALCIUM 20 MG: 10 TABLET, FILM COATED ORAL at 21:40

## 2019-06-18 RX ADMIN — PHENYLEPHRINE HYDROCHLORIDE 100 MCG: 10 INJECTION INTRAVENOUS at 19:15

## 2019-06-18 RX ADMIN — FLUOXETINE 20 MG: 20 CAPSULE ORAL at 21:41

## 2019-06-18 RX ADMIN — LIDOCAINE HYDROCHLORIDE 3 ML: 20 INJECTION, SOLUTION INFILTRATION; PERINEURAL at 18:04

## 2019-06-18 RX ADMIN — Medication 10 ML: at 21:42

## 2019-06-18 RX ADMIN — SODIUM CHLORIDE: 9 INJECTION, SOLUTION INTRAVENOUS at 21:18

## 2019-06-18 RX ADMIN — PHENYLEPHRINE HYDROCHLORIDE 100 MCG: 10 INJECTION INTRAVENOUS at 19:27

## 2019-06-18 RX ADMIN — IOPAMIDOL 75 ML: 755 INJECTION, SOLUTION INTRAVENOUS at 13:35

## 2019-06-18 RX ADMIN — MORPHINE SULFATE 2 MG: 4 INJECTION INTRAVENOUS at 14:34

## 2019-06-18 ASSESSMENT — PAIN SCALES - GENERAL
PAINLEVEL_OUTOF10: 9
PAINLEVEL_OUTOF10: 8
PAINLEVEL_OUTOF10: 0
PAINLEVEL_OUTOF10: 6
PAINLEVEL_OUTOF10: 0
PAINLEVEL_OUTOF10: 0
PAINLEVEL_OUTOF10: 8

## 2019-06-18 ASSESSMENT — PULMONARY FUNCTION TESTS
PIF_VALUE: 3
PIF_VALUE: 3
PIF_VALUE: 4
PIF_VALUE: 3
PIF_VALUE: 2
PIF_VALUE: 3
PIF_VALUE: 4
PIF_VALUE: 3
PIF_VALUE: 3
PIF_VALUE: 4
PIF_VALUE: 3
PIF_VALUE: 2
PIF_VALUE: 0
PIF_VALUE: 3
PIF_VALUE: 2
PIF_VALUE: 5
PIF_VALUE: 4
PIF_VALUE: 3
PIF_VALUE: 2
PIF_VALUE: 3
PIF_VALUE: 3
PIF_VALUE: 4
PIF_VALUE: 2
PIF_VALUE: 3
PIF_VALUE: 4
PIF_VALUE: 2
PIF_VALUE: 2
PIF_VALUE: 4
PIF_VALUE: 1
PIF_VALUE: 3
PIF_VALUE: 0
PIF_VALUE: 4
PIF_VALUE: 2
PIF_VALUE: 3
PIF_VALUE: 9
PIF_VALUE: 3
PIF_VALUE: 4
PIF_VALUE: 3
PIF_VALUE: 4
PIF_VALUE: 1
PIF_VALUE: 3
PIF_VALUE: 3
PIF_VALUE: 4
PIF_VALUE: 3
PIF_VALUE: 2
PIF_VALUE: 3
PIF_VALUE: 4
PIF_VALUE: 3
PIF_VALUE: 3
PIF_VALUE: 4
PIF_VALUE: 3
PIF_VALUE: 2
PIF_VALUE: 3
PIF_VALUE: 0
PIF_VALUE: 3
PIF_VALUE: 2
PIF_VALUE: 3
PIF_VALUE: 3
PIF_VALUE: 4
PIF_VALUE: 3
PIF_VALUE: 4
PIF_VALUE: 3
PIF_VALUE: 2
PIF_VALUE: 3

## 2019-06-18 ASSESSMENT — PAIN DESCRIPTION - LOCATION
LOCATION: HIP
LOCATION: HIP

## 2019-06-18 ASSESSMENT — PAIN DESCRIPTION - ORIENTATION
ORIENTATION: RIGHT
ORIENTATION: RIGHT

## 2019-06-18 ASSESSMENT — ENCOUNTER SYMPTOMS: SHORTNESS OF BREATH: 1

## 2019-06-18 ASSESSMENT — PAIN DESCRIPTION - DESCRIPTORS: DESCRIPTORS: DISCOMFORT

## 2019-06-18 ASSESSMENT — PAIN DESCRIPTION - PAIN TYPE
TYPE: SURGICAL PAIN
TYPE: ACUTE PAIN

## 2019-06-18 NOTE — PLAN OF CARE
Ortho POC for: Right hip fracture    HPI: The patient says she was at home when she stepped in some water and fell in her kitchen, but does not know exactly how. Pt's son is at bedside and says pt did not step in water but that she was filling her glass with water and fell in the kitchen, but does not know exactly how. Some discrepencies between two stories and pt with difficulty providing HPI. Per son, pt has had difficulty with ambulation for some time and tends to shuffle; does not use any assistive devices. Pt's son called 46 and she was brought here by squad. C/o right hip and knee pain. Son concerned about pt's anxiety. Pt lives at home with son. Examination: Right LE ER at rest. Tenderness with palpation right hip, swelling. No erythema or ecchymosis. NVI. Moving foot and ankle without difficulty. Good pulse. Radiology:   CT Head WO Contrast   Final Result   No acute intracranial abnormality. CT ABDOMEN PELVIS W IV CONTRAST Additional Contrast? None   Final Result   Acute traumatic displaced right proximal femur fracture that involves the   lesser trochanter. No acute intra-abdominal or intrapelvic abnormality. Biliary ductal dilatation status post cholecystectomy, slightly increased   compared to prior from 2017. Moderate to large stool burden consistent with constipation. XR FEMUR RIGHT (MIN 2 VIEWS)   Final Result   Acute traumatic mildly displaced right subtrochanteric femur fracture. XR HIP RIGHT (2-3 VIEWS)   Final Result   Acute traumatic mildly displaced right subtrochanteric femur fracture. Assessment: Right subtrochanteric hip fracture    Plan: Our recommendation is for surgical intervention. Plan for OR this evening for right hip IM nail with Dr. Liane Victor. Remain NPO, pt last ate around 0700 per son. Ancef on call to OR, RN to verify consent, check type and screen. Medicine to clear pt for OR, Dr. Rebecca Schwarz notified and aware of POC. Chart reviewed.

## 2019-06-18 NOTE — ED PROVIDER NOTES
[address] ED  EMERGENCY DEPARTMENT ENCOUNTER        Pt Name: Jenny Sandy  MRN: [id number]  [place] 1940  Date of evaluation: 6/18/2019  Provider: ZACH Holland - CNP  PCP: Oswaldo Leslie MD    This patient was seen and evaluated by the attending physician Lavera Klinefelter, [address]       Chief Complaint   Patient presents with    Fall     tripped and fell in her kitchen. Neighbor and son placed pt in a chair, 911 called. Right hip pain, 20ga right hand. Son at bedside. HISTORY OF PRESENT ILLNESS   (Location/Symptom, Timing/Onset, Context/Setting, Quality, Duration, Modifying Factors, Severity)  Note limiting factors. Jenny Sandy is a 78 y.o. female who arrives EMS for evaluation of right hip/femur pain. Patient states that she was walking in her kitchen and slipped and fell on some water that she had spilled on the floor. She is complaining of right hip, right upper leg, right upper quadrant and right lower quadrant pain. Patient denies hitting her head, denies LOC, denies chest pain, shortness of breath, and denies cervical spine pain. Patient's son at bedside and states that he heard the thud and immediately went in the kitchen where he found patient sitting on the floor. Patient denies headache, nausea, and vomiting. Patient's son states that patient does have a history of severe constipation. Nursing Notes were all reviewed and agreed with or any disagreements were addressed  in the HPI. REVIEW OF SYSTEMS    (2-9 systems for level 4, 10 or more for level 5)     Review of Systems    Positives and Pertinent negatives as per HPI. Except as noted abovein the ROS, all other systems were reviewed and negative.        PAST MEDICAL HISTORY     Past Medical History:   Diagnosis Date    Anxiety     Arthritis     [facility] ([facility]     Depression     Diverticulosis     GERD (gastroesophageal reflux disease)     Hyperlipidemia     Hypertension     NSTEMI (non-ST elevated myocardial infarction) ([facility] 5/23/15    suspect microvasc brian, Dr. Chi Laura     Past Surgical History:   Procedure Laterality Date    CATARACT REMOVAL WITH IMPLANT Left 5/14/2014    CHOLECYSTECTOMY      COLON SURGERY  1991    COLONOSCOPY  07/02/2015    diverticulosis    CORONARY ANGIOPLASTY      CYSTOSCOPY Right 03/23/2017    Cysto, retro, ureteroscopy. Stone manipulation with out extraction right stent placement     GALLBLADDER SURGERY      HEMORRHOID SURGERY      HYSTERECTOMY, TOTAL ABDOMINAL  1980    OTHER SURGICAL HISTORY  05/28/2014    phacoemulsification of cataract with intraocular lens implant right eye    VARICOSE VEIN SURGERY           CURRENTMEDICATIONS       Previous Medications    AMLODIPINE (NORVASC) 2.5 MG TABLET    Take 1 tablet by mouth daily    ASPIRIN EC 81 MG EC TABLET    Take 1 tablet by mouth daily With food    ATORVASTATIN (LIPITOR) 20 MG TABLET    Take 1 tablet by mouth nightly    CALCIUM CARBONATE 600 MG TABS TABLET    Take 1 tablet by mouth daily    CYCLOBENZAPRINE (FLEXERIL) 10 MG TABLET    Take 10 mg by mouth nightly    DICLOFENAC SODIUM (VOLTAREN) 1 % GEL    Apply 2 g topically 4 times daily    FERROUS SULFATE 325 (65 FE) MG TABLET    Take 325 mg by mouth daily (with breakfast)    FISH OIL-OMEGA-3 FATTY ACIDS 1000 MG CAPSULE    Take 1 g by mouth daily     HYDROCODONE-ACETAMINOPHEN (NORCO) 5-325 MG PER TABLET    Take 1 tablet by mouth every 6 hours as needed for Pain. [employer] LANSOPRAZOLE (PREVACID) 30 MG DELAYED RELEASE CAPSULE    TAKE 1 CAPSULE DAILY.     MELATONIN 10 MG TABS    Take 10 mg by mouth nightly as needed    MELOXICAM (MOBIC) 15 MG TABLET    Take 15 mg by mouth daily    METHYLCELLULOSE (CITRUCEL) 500 MG TABS    Take 1 tablet by mouth daily    METOPROLOL SUCCINATE (TOPROL XL) 50 MG EXTENDED RELEASE TABLET    Take 1 tablet by mouth daily    MIRABEGRON ER (MYRBETRIQ) 25 MG TB24    TAKE 1 TABLET DAILY    MULTIPLE VITAMINS-MINERALS (THERAPEUTIC MULTIVITAMIN-MINERALS) TABLET    Take 1 tablet by mouth daily    NITROGLYCERIN (NITROSTAT) 0.4 MG SL TABLET    Place 1 tablet under the tongue every 5 minutes as needed for Chest pain    OLANZAPINE-FLUOXETINE (SYMBYAX) 6-25 MG CAPS CAPSULE    TAKE 1 CAPSULE EVERY       EVENING    POLYETHYLENE GLYCOL (GLYCOLAX) POWDER    POUR 17GM OF POWDER INTO   MEASURING CAP STIR INTO 8OZOF WATER AND DRINK DAILY         ALLERGIES     Sulfamethoxazole; Trimethoprim; Wellbutrin [bupropion hcl]; Advil [ibuprofen micronized]; Bactrim; Erythromycin; Flexeril [cyclobenzaprine hcl]; and Guaifenesin er    FAMILYHISTORY       Family History   Problem Relation Age of Onset    Cancer Mother     Stroke Father           SOCIAL HISTORY       Social History     Socioeconomic History    Marital status:       Spouse name: None    Number of children: 3    Years of education: 15    Highest education level: None   Occupational History    Occupation: retired   Social Needs    Financial resource strain: None    Food insecurity:     Worry: None     Inability: None    Transportation needs:     Medical: None     Non-medical: None   Tobacco Use    Smoking status: Never Smoker    Smokeless tobacco: Never Used   Substance and Sexual Activity    Alcohol use: No    Drug use: No    Sexual activity: Not Currently     Partners: Male   Lifestyle    Physical activity:     Days per week: None     Minutes per session: None    Stress: None   Relationships    Social connections:     Talks on phone: None     Gets together: None     Attends Yazidism service: None     Active member of club or organization: None     Attends meetings of clubs or organizations: None     Relationship status: None    Intimate partner violence:     Fear of current or ex partner: None     Emotionally abused: None     Physically abused: None     Forced sexual activity: None   Other Topics Concern    None   Social Health - [facility],  [place], [facility]   Phone [phone]   APTT    Narrative:     Performed at:  [facility],  [place], [facility]   Phone [phone]   PROTIME-INR    Narrative:     Performed at:  [facility],  [place], [facility]   Phone [phone]   HEPATIC FUNCTION PANEL    Narrative:     Performed at:  [facility],  [place], [facility]   Phone [phone]   LIPASE    Narrative:     Performed at:  [facility]) - [facility],  [place], [facility]   Phone [phone]   TYPE AND SCREEN       All other labs were within normal range or not returned as of this dictation. EKG: All EKG's are interpreted by the Emergency Department Physician who either signs orCo-signs this chart in the absence of a cardiologist.  Please see their note for interpretation of EKG. RADIOLOGY:   Non-plain film images such as CT, Ultrasound and MRI are read by the radiologist. [facility] radiographic images are visualized andpreliminarily interpreted by the  ED Provider with the below findings:        Interpretation [facility] Radiologist below, if available at the time of this note:    CT Head WO Contrast   Final Result   No acute intracranial abnormality. CT ABDOMEN PELVIS W IV CONTRAST Additional Contrast? None   Final Result   Acute traumatic displaced right proximal femur fracture that involves the   lesser trochanter. No acute intra-abdominal or intrapelvic abnormality. Biliary ductal dilatation status post cholecystectomy, slightly increased   compared to prior from 2017. Moderate to large stool burden consistent with constipation. XR FEMUR RIGHT (MIN 2 VIEWS)   Final Result   Acute traumatic mildly displaced right subtrochanteric femur fracture.          XR HIP RIGHT (2-3 VIEWS)   Final Result   Acute traumatic mildly displaced right subtrochanteric femur fracture. No results found. PROCEDURES   Unless otherwise noted below, none     Procedures    CRITICAL CARE TIME   N/A    CONSULTS:  IP CONSULT TO HOSPITALIST  IP CONSULT TO ORTHOPEDIC SURGERY  IP CONSULT TO ORTHOPEDIC SURGERY      EMERGENCY DEPARTMENT COURSE and DIFFERENTIALDIAGNOSIS/MDM:   Vitals:    Vitals:    06/18/19 1434 06/18/19 1435 06/18/19 1504 06/18/19 1507   BP: 107/74 107/74 (!) 79/49 (!) 101/50   Pulse: 76 68 66 68   Resp: 27 22 25 27   Temp:       TempSrc:       SpO2: 98% 99% 94% 97%   Weight:       Height:           Patient was given thefollowing medications:  Medications   ceFAZolin (ANCEF) 2 g in sterile water 20 mL IV syringe (has no administration in time range)   morphine sulfate (PF) injection 2 mg (2 mg Intravenous Given 6/18/19 1254)   iopamidol (ISOVUE-370) 76 % injection 75 mL (75 mLs Intravenous Given 6/18/19 1335)   morphine sulfate (PF) injection 2 mg (2 mg Intravenous Given 6/18/19 1434)   LORazepam (ATIVAN) injection 0.5 mg (0.5 mg Intravenous Given 6/18/19 1505)     Patient is nontoxic, in no apparent distress, laying in bed. Right leg shortened and externally rotated. Patient denies pain with palpation to cervical, thoracic spine, and head. Lab work ordered, x-ray of right hip and right femur ordered, and CT of head and abdomen ordered. Patient given morphine for pain. EKG interpreted by Dr. Lazarus Ruddle and reviewed by myself, sinus rhythm rate 66 no STEMI noted, unchanged from previous EKG 11/20/2018. Hemoglobin stable at 11.6 without signs of bleeding. BUN slightly elevated at 31, IV hydration given. Head CT without abnormalities, CT of abdomen positive for constipation. Right femur x-ray shows an acute traumatic mildly displaced right sub-trochanteric femur fracture. Patient to be admitted for surgery.     [id number] -spoke to Renata Oconnor, [address] hospitalist who was updated about patient and agreed to admission at this time. 1425 -spoke to Dr. Rosa Lockett, orthopedist who is aware of patient and is going to take patient to the OR this evening if patient is medically cleared. 1430 -updated patient and son at this time about femur fracture and admission for surgery. Patient given another dose of pain medication at this time. No further questions at this time. Differential diagnoses include but are not limited to contusion, hip fracture, femur fracture, tendon or neurovascular injury, intracranial hemorrhage, subdural hematoma, subarachnoid bleed, and compartment syndrome. FINAL IMPRESSION      1.  Closed displaced subtrochanteric fracture of right femur, initial encounter [facility])          DISPOSITION/PLAN   DISPOSITION        PATIENT REFERREDTO:  Nixon Odonnell MD  [address] [facility] [address]  [address] Quin Campos MD  [address] , [address] [facility]  [phone]            DISCHARGE MEDICATIONS:  New Prescriptions    No medications on file       DISCONTINUED MEDICATIONS:  Discontinued Medications    No medications on file              (Please note that portions ofthis note were completed with a voice recognition program.  Efforts were made to edit the dictations but occasionally words are mis-transcribed.)    ZACH Stallings CNP (electronically signed)            ZACH Stallings CNP  06/18/19 2171

## 2019-06-18 NOTE — CONSULTS
Department of Orthopedic Surgery  Attending   Consult Note        Reason for Consult:  Right hip pain  Requesting Physician: Chery Angelo, *  Date of Service: 6/18/2019 5:08 PM    CHIEF COMPLAINT:  As Above    History Obtained From:  patient    HISTORY OF PRESENT ILLNESS:                The patient is a 78 y.o. female who presents with above chief complaint. Right hp pain. Pt fell in her kitchen this AM and was found to have a right subtroch fx. Denies other associated injuries. Limited ambulator. Her son states that she ambulates less than 20 feet a day    Past Medical History:        Diagnosis Date    Anxiety     Arthritis     [facility] ([facility]     Depression     Diverticulosis     GERD (gastroesophageal reflux disease)     Hyperlipidemia     Hypertension     NSTEMI (non-ST elevated myocardial infarction) ([facility] 5/23/15    suspect microvasc dz, Dr. Zane Villar     Past Surgical History:        Procedure Laterality Date    CATARACT REMOVAL WITH IMPLANT Left 5/14/2014    CHOLECYSTECTOMY      COLON SURGERY  1991    COLONOSCOPY  07/02/2015    diverticulosis    CORONARY ANGIOPLASTY      CYSTOSCOPY Right 03/23/2017    Cysto, retro, ureteroscopy. Stone manipulation with out extraction right stent placement     GALLBLADDER SURGERY      HEMORRHOID SURGERY      HYSTERECTOMY, TOTAL ABDOMINAL  1980    OTHER SURGICAL HISTORY  05/28/2014    phacoemulsification of cataract with intraocular lens implant right eye    VARICOSE VEIN SURGERY           Medications Prior to Admission:   Prior to Admission medications    Medication Sig Start Date End Date Taking? Authorizing Provider   HYDROcodone-acetaminophen (NORCO) 5-325 MG per tablet Take 1 tablet by mouth every 6 hours as needed for Pain. Layla Hilton     Historical Provider, MD   atorvastatin (LIPITOR) 20 MG tablet Take 1 tablet by mouth nightly 9/17/18   Historical Provider, MD   meloxicam (MOBIC) 15 MG tablet Take 15 mg by mouth daily 10/24/18   Historical Historical Provider, MD       Allergies:  Sulfamethoxazole; Trimethoprim; Wellbutrin [bupropion hcl]; Advil [ibuprofen micronized]; Bactrim; Erythromycin; and Guaifenesin er    Social History:    Tobacco:  reports that she has never smoked. She has never used smokeless tobacco.   Alcohol:  reports that she does not drink alcohol. Illicit Drug: No  Family History:       Problem Relation Age of Onset   Lora Cancer Mother     Stroke Father        REVIEW OF SYSTEMS:    CONSTITUTIONAL:  negative  MUSCULOSKELETAL:  positive for  pain    PHYSICAL EXAM:    awake, alert, cooperative, no apparent distress, and appears stated age  MUSCULOSKELETAL:  Right LE shortened and ER NVI    DATA:    CBC:   Recent Labs     06/18/19  1247   WBC 9.4   HGB 11.6*        BMP:    Recent Labs     06/18/19  1247      K 4.6      CO2 26   BUN 31*   CREATININE 0.8   GLUCOSE 119*     INR:   Recent Labs     06/18/19  1247   INR 1.00       Radiology:   CT Head WO Contrast   Final Result   No acute intracranial abnormality. CT ABDOMEN PELVIS W IV CONTRAST Additional Contrast? None   Final Result   Acute traumatic displaced right proximal femur fracture that involves the   lesser trochanter. No acute intra-abdominal or intrapelvic abnormality. Biliary ductal dilatation status post cholecystectomy, slightly increased   compared to prior from 2017. Moderate to large stool burden consistent with constipation. XR FEMUR RIGHT (MIN 2 VIEWS)   Final Result   Acute traumatic mildly displaced right subtrochanteric femur fracture. XR HIP RIGHT (2-3 VIEWS)   Final Result   Acute traumatic mildly displaced right subtrochanteric femur fracture. IMPRESSION/RECOMMENDATIONS:    Assessment: right subtroch hip fx    Plan:  1) to the OR for ORIF and IM nailing. Risks and benefits of surgery were discussed with the patient and her family and informed consent was signed in the chart prior to surgery. No guarantees were given or implied        Thank you for the opportunity to consult on this patient.     Zaira Rodrigues

## 2019-06-18 NOTE — ED NOTES
1406-Perfect serve sent to Dr. Sg Ramos. Call was returned by Guzman Juan and spoke to Amanda Diaz NP         Call was placed to Ortho for consult.      Call was returned by Dr. Lindsay Meigs and spoke to Amanda Diaz NP   [facility]  06/18/19 [address] [facility]  06/18/19 [address] [facility]  06/18/19 2462

## 2019-06-18 NOTE — BRIEF OP NOTE
Brief Postoperative Note  ______________________________________________________________    Patient: Latha Monroy  YOB: 1940  MRN: [id number]  Date of Procedure: 6/18/2019    Pre-Op Diagnosis: RIGHT SUBTROCHANTERIC FRACTURE    Post-Op Diagnosis: Same       Procedure(s): Open reduction  INTRAMEDULLARY NAILING using [employer]s TFN 11 x 380 mm 130 degree lock proximal and distal    Anesthesia: Anesthesia type not filed in the log. Surgeon(s):  Sonya Malagon MD    Assistant: Bebeto    Estimated Blood Loss (mL): 545     Complications: None    Specimens:   * No specimens in log *    Implants:  Implant Name Type Inv. Item Serial No.  Lot No. LRB No. Used   IMPL HIP CABLE W/CRIMP [id number] SS ST - [id number] Hip IMPL HIP CABLE W/CRIMP [id number] [facility]  Right 2   NAIL 11MM 130 DEG TI TIMBO TFNA 380MM RT ST - [id number] Screw/Plate/Nail/Abiel NAIL 92SE 130 DEG TI TIMBO TFNA 380MM RT ST [id number] [facility]  Right 1   IMPL BLADE HELICAL TFNA FEN ST 408NW - [id number] Screw/Plate/Nail/Abiel IMPL BLADE HELICAL TFNA FEN ST 683TC [id number] [facility]  Right 1   SCREW LK F/ INTRMDLRY NL 5.0X40MM - [id number] Screw/Plate/Nail/Abiel SCREW LK F/ INTRMDLRY NL 5.0X40MM [id number] [facility]  Right 1         Drains:   Urethral Catheter Straight-tip 16 fr (Active)   Catheter Indications Perioperative use in selected surgeries including but not limited to urologic, pelvic or need for intraoperative monitoring of urinary output due to prolonged surgery, large volume infusion or need for diuretic therapy in surgery 6/18/2019  3:07 PM   Site Assessment [place] 6/18/2019  3:07 PM   Urine Color Yellow 6/18/2019  3:07 PM   Urine Appearance Cloudy 6/18/2019  3:07 PM       Findings: Comminuted subtrochanteric fracture    Perico Vega MD  Date: 6/18/2019  Time: 7:39 PM

## 2019-06-18 NOTE — ANESTHESIA PRE PROCEDURE
325 mg by mouth daily (with breakfast)    Historical Provider, MD   methylcellulose (CITRUCEL) 500 MG TABS Take 1 tablet by mouth daily    Historical Provider, MD   polyethylene glycol (GLYCOLAX) powder POUR 17GM OF POWDER INTO   MEASURING CAP STIR INTO 8OZOF WATER AND DRINK DAILY  Patient taking differently: as needed POUR 17GM OF POWDER INTO   MEASURING CAP STIR INTO 8OZOF WATER AND DRINK DAILY 7/3/17   Junius Holstein, MD   calcium carbonate 600 MG TABS tablet Take 1 tablet by mouth daily 7/6/15   Karyle Champagne, MD   Multiple Vitamins-Minerals (THERAPEUTIC MULTIVITAMIN-MINERALS) tablet Take 1 tablet by mouth daily 7/13/15   Karyle Champagne, MD   fish oil-omega-3 fatty acids 1000 MG capsule Take 1 g by mouth daily     Historical Provider, MD       Current medications:    Current Facility-Administered Medications   Medication Dose Route Frequency Provider Last Rate Last Dose    [START ON 6/19/2019] amLODIPine (NORVASC) tablet 2.5 mg  2.5 mg Oral Daily Kalpana Richey PA-C        [START ON 6/19/2019] aspirin EC tablet 81 mg  81 mg Oral Daily Kalpana Richey PA-C        atorvastatin (LIPITOR) tablet 20 mg  20 mg Oral Nightly Kalpana Richey PA-C        [START ON 6/19/2019] calcium elemental (OSCAL) tablet 500 mg  1 tablet Oral Daily Kalpana Richey PA-C        [START ON 6/19/2019] ferrous sulfate tablet 325 mg  325 mg Oral Daily with breakfast Stu Richey PA-C        [START ON 6/19/2019] omega-3 acid ethyl esters (LOVAZA) capsule 1 g  1 g Oral Daily Kalpana Richey PA-C        HYDROcodone-acetaminophen (NORCO) 5-325 MG per tablet 1 tablet  1 tablet Oral Q6H PRN Lizabeth Concepcion PA-C        [START ON 6/19/2019] pantoprazole (PROTONIX) tablet 40 mg  40 mg Oral QAM AC Kalpana Richey PA-C        melatonin tablet 5 mg  5 mg Oral Nightly PRN Stu Richey PA-C        metoprolol succinate (TOPROL XL) extended release tablet 50 mg  50 mg Oral Daily Chikis romero PA-C        [START ON 6/19/2019] Mirabegron ER TB24 25 mg  1 tablet Oral Daily Kalpana Richey PA-C        polyethylene glycol (GLYCOLAX) packet 17 g  17 g Oral Daily Kalpana Richey PA-C        sennosides-docusate sodium (SENOKOT-S) 8.6-50 MG tablet 2 tablet  2 tablet Oral Daily Kalpana Richey PA-C        sodium chloride flush 0.9 % injection 10 mL  10 mL Intravenous 2 times per day Justin Campos PA-C        sodium chloride flush 0.9 % injection 10 mL  10 mL Intravenous PRN Chikis Richey PA-C        acetaminophen (TYLENOL) tablet 650 mg  650 mg Oral Q4H PRN Kalpana Richey PA-C        ondansetron (ZOFRAN) injection 4 mg  4 mg Intravenous Q6H PRN Chikis Richey PA-C        [START ON 6/19/2019] enoxaparin (LOVENOX) injection 40 mg  40 mg Subcutaneous Daily Kalpana Richey PA-C        0.9 % sodium chloride infusion   Intravenous Continuous BIMAL Fishman 75 mL/hr at 06/18/19 1623      morphine sulfate (PF) injection 2 mg  2 mg Intravenous Q2H PRN Chikis Richey PA-C   2 mg at 06/18/19 1623    OLANZapine (ZYPREXA) tablet 5 mg  5 mg Oral Nightly Kalpana Richey PA-C        And    FLUoxetine (PROZAC) capsule 20 mg  20 mg Oral Nightly Kalpana Richey PA-C        [START ON 6/19/2019] polycarbophil (FIBERCON) tablet 625 mg  625 mg Oral Daily Kalpana Richey PA-C        sodium chloride 0.9 % irrigation    Continuous PRN Pina Hussein MD   1,000 mL at 06/18/19 1813     Facility-Administered Medications Ordered in Other Encounters   Medication Dose Route Frequency Provider Last Rate Last Dose    0.9 % sodium chloride infusion    Continuous PRN Batool Munguia  mL/hr at 06/18/19 1801      propofol injection    PRN Batool Munguia MD   110 mg at 06/18/19 1804    lidocaine 2 % injection    PRN Batool Munguia MD   3 mL at 06/18/19 7124    bupivacaine (PF) (MARCAINE) 0.25 % injection    PRN Chrissy Bhatia MD   30 mL at 06/18/19 1755    ePHEDrine injection    PRN Chrissy Bhatia MD   20 mg at 06/18/19 1819       Allergies: Allergies   Allergen Reactions    Sulfamethoxazole Anaphylaxis    Trimethoprim Anaphylaxis    Wellbutrin [Bupropion Hcl] Anaphylaxis    Advil [Ibuprofen Micronized]     Bactrim Swelling    Erythromycin     Guaifenesin Er        Problem List:    Patient Active Problem List   Diagnosis Code    GERD (gastroesophageal reflux disease) K21.9    Urge urinary incontinence N39.41    Bipolar disorder ([facility]) F31.9    Chest pain R07.9    Essential hypertension I10    Hyperlipidemia E78.5    Coronary artery disease involving native coronary artery of native heart with angina pectoris ([facility]) I25.119    Bipolar disorder, in full remission, most recent episode depressed ([facility]) F31.76    Cardiac microvascular disease I25.89    HASKINS (dyspnea on exertion) R06.09    Pyelonephritis N12    Fever R50.9    Renal calculi N20.0    Abnormal CXR R93.89    Epigastric pain R10.13    Thyroid nodule E04.1    Elevated LFTs R94.5    Primary osteoarthritis involving multiple joints M15.0    Hypertensive urgency I16.0    Closed fracture of right femur ([facility]) S72.91XA       Past Medical History:        Diagnosis Date    Anxiety     Arthritis     Bipolar disorder ([facility])     Depression     Diverticulosis     GERD (gastroesophageal reflux disease)     Hyperlipidemia     Hypertension     NSTEMI (non-ST elevated myocardial infarction) ([facility] [address] 5/23/15    suspect microvasc brian, Dr. Nohemi Manzanares       Past Surgical History:        Procedure Laterality Date    CATARACT REMOVAL WITH IMPLANT Left 5/14/2014    CHOLECYSTECTOMY      COLON SURGERY  1991    COLONOSCOPY  07/02/2015    diverticulosis    CORONARY ANGIOPLASTY      CYSTOSCOPY Right 03/23/2017    Cysto, retro, ureteroscopy.  Stone manipulation with out extraction right stent placement     GALLBLADDER SURGERY      HEMORRHOID SURGERY      HYSTERECTOMY, TOTAL ABDOMINAL  1980    OTHER SURGICAL HISTORY  05/28/2014    phacoemulsification of cataract with intraocular lens implant right eye    VARICOSE VEIN SURGERY         Social History:    Social History     Tobacco Use    Smoking status: Never Smoker    Smokeless tobacco: Never Used   Substance Use Topics    Alcohol use: No                                Counseling given: Not Answered      Vital Signs (Current):   Vitals:    06/18/19 1435 06/18/19 1504 06/18/19 1507 06/18/19 1630   BP: 107/74 (!) 79/49 (!) 101/50 (!) 76/51   Pulse: 68 66 68 66   Resp: 22 25 27 18   Temp:    97.4 °F (36.3 °C)   TempSrc:    Oral   SpO2: 99% 94% 97% 95%   Weight:       Height:                                                  BP Readings from Last 3 Encounters:   06/18/19 (!) 76/51   06/18/19 104/68   11/20/18 (!) 166/97       NPO Status: Time of last liquid consumption: 1100                        Time of last solid consumption: 0700                        Date of last liquid consumption: 06/18/19                        Date of last solid food consumption: 06/18/19    BMI:   Wt Readings from Last 3 Encounters:   06/18/19 150 lb (68 kg)   11/20/18 150 lb (68 kg)   08/21/18 160 lb (72.6 kg)     Body mass index is 27.44 kg/m².     CBC:   Lab Results   Component Value Date    WBC 9.4 06/18/2019    RBC 3.60 06/18/2019    HGB 11.6 06/18/2019    HCT 34.3 06/18/2019    MCV 95.3 06/18/2019    RDW 13.4 06/18/2019     06/18/2019       CMP:   Lab Results   Component Value Date     06/18/2019    K 4.6 06/18/2019     06/18/2019    CO2 26 06/18/2019    BUN 31 06/18/2019    CREATININE 0.8 06/18/2019    GFRAA >60 06/18/2019    AGRATIO 1.2 11/20/2018    LABGLOM >60 06/18/2019    LABGLOM 71 05/11/2016    GLUCOSE 119 06/18/2019    PROT 6.6 06/18/2019    CALCIUM 9.4 06/18/2019    BILITOT 0.4 06/18/2019    ALKPHOS 77 06/18/2019    AST 33 06/18/2019    ALT 36 06/18/2019 POC Tests: No results for input(s): POCGLU, POCNA, POCK, POCCL, POCBUN, POCHEMO, POCHCT in the last 72 hours. Coags:   Lab Results   Component Value Date    PROTIME 11.4 06/18/2019    INR 1.00 06/18/2019    APTT 28.2 06/18/2019       HCG (If Applicable): No results found for: PREGTESTUR, PREGSERUM, HCG, HCGQUANT     ABGs:   Lab Results   Component Value Date    PHART 7.467 03/23/2017    PO2ART 95.3 03/23/2017    QMB7TPL 30.8 03/23/2017    AIP4RAC 21.8 03/23/2017    BEART -1.0 03/23/2017    M1SCKHUH 97.7 03/23/2017        Type & Screen (If Applicable):  No results found for: LABABO, LABRH    Anesthesia Evaluation   no history of anesthetic complications:   Airway: Mallampati: III  TM distance: <3 FB   Neck ROM: limited  Mouth opening: > = 3 FB Dental:    (+) upper dentures and lower dentures      Pulmonary:   (+) shortness of breath:                             Cardiovascular:    (+) hypertension:, past MI: > 6 months and no interval change, CAD: no interval change, HASKINS:, hyperlipidemia      ECG reviewed      Echocardiogram reviewed                  Neuro/Psych:   (+) psychiatric history:depression/anxiety             GI/Hepatic/Renal:   (+) GERD:, renal disease: kidney stones,           Endo/Other:    (+) : arthritis: OA., .                 Abdominal:           Vascular:                                      Pre-Operative Diagnosis: Closed fracture of right femur (HCC) [S72.91XA]    78 y.o.   BMI:  Body mass index is 27.44 kg/m².      Vitals:    06/18/19 1435 06/18/19 1504 06/18/19 1507 06/18/19 1630   BP: 107/74 (!) 79/49 (!) 101/50 (!) 76/51   Pulse: 68 66 68 66   Resp: 22 25 27 18   Temp:    97.4 °F (36.3 °C)   TempSrc:    Oral   SpO2: 99% 94% 97% 95%   Weight:       Height:           Allergies   Allergen Reactions    Sulfamethoxazole Anaphylaxis    Trimethoprim Anaphylaxis    Wellbutrin [Bupropion Hcl] Anaphylaxis    Advil [Ibuprofen Micronized]     Bactrim Swelling    Erythromycin     Guaifenesin Er        Social History     Tobacco Use    Smoking status: Never Smoker    Smokeless tobacco: Never Used   Substance Use Topics    Alcohol use: No       LABS:    CBC  Lab Results   Component Value Date/Time    WBC 9.4 06/18/2019 12:47 PM    HGB 11.6 (L) 06/18/2019 12:47 PM    HCT 34.3 (L) 06/18/2019 12:47 PM     06/18/2019 12:47 PM     RENAL  Lab Results   Component Value Date/Time     06/18/2019 12:47 PM    K 4.6 06/18/2019 12:47 PM     06/18/2019 12:47 PM    CO2 26 06/18/2019 12:47 PM    BUN 31 (H) 06/18/2019 12:47 PM    CREATININE 0.8 06/18/2019 12:47 PM    GLUCOSE 119 (H) 06/18/2019 12:47 PM     COAGS  Lab Results   Component Value Date/Time    PROTIME 11.4 06/18/2019 12:47 PM    INR 1.00 06/18/2019 12:47 PM    APTT 28.2 06/18/2019 12:47 PM        Anesthesia Plan      general     ASA 3     (Risks, benefits and alternatives of GA for operative care and ultrasound guided fascia iliaca compartment nerve block for post operative analgesia discussed with pt. All questions answered. Willing to proceed.)  Induction: intravenous. MIPS: Postoperative opioids intended. Anesthetic plan and risks discussed with patient and child/children.                       Tomi Diaz MD   6/18/2019

## 2019-06-18 NOTE — ED NOTES
Report given to [facility] will resume pt care.      Nyla Shin RN  06/18/19 2619

## 2019-06-18 NOTE — PLAN OF CARE
Right proximal femur fracture after mechanical fall     Med surg    Shanon Saul PA-C  6/18/2019 2:29 PM

## 2019-06-18 NOTE — H&P
Hospital Medicine History & Physical      PCP: Camron Cherry MD    Date of Admission: 6/18/2019    Date of Service: Pt seen/examined on 6/18/2019    Chief Complaint:    Chief Complaint   Patient presents with    Fall     tripped and fell in her kitchen. Neighbor and son placed pt in a chair, 911 called. Right hip pain, 20ga right hand. Son at bedside. History Of Present Illness: The patient is a 78 y.o. female with bipolar DO, depression, GERD, HLD, HTN, prior NSTEMI who presented to [facility] ED with complaint of fall with hip pain. Patient is a poor historian but son provides history. Patient lives with her son. He states that she was in the kitchen when he heard a thud. He found her on the ground with her right leg twisted but the patient was conscious. He states that he straightened out her leg and the pain was severe and he felt a pop. The patient denies any syncope. She denies any chest pain or shortness of breath. She reports her only pain is in the R hip and groin. Son reports that the patient has limited mobility at baseline. Past Medical History:        Diagnosis Date    Anxiety     Arthritis     Bipolar disorder ([facility] [address]     Depression     Diverticulosis     GERD (gastroesophageal reflux disease)     Hyperlipidemia     Hypertension     NSTEMI (non-ST elevated myocardial infarction) ([address] 5/23/15    suspect microvasc dz, Dr. Gabriel Abreu       Past Surgical History:        Procedure Laterality Date    CATARACT REMOVAL WITH IMPLANT Left 5/14/2014    CHOLECYSTECTOMY      COLON SURGERY  1991    COLONOSCOPY  07/02/2015    diverticulosis    CORONARY ANGIOPLASTY      CYSTOSCOPY Right 03/23/2017    Cysto, retro, ureteroscopy.  Stone manipulation with out extraction right stent placement     GALLBLADDER SURGERY      HEMORRHOID SURGERY      HYSTERECTOMY, TOTAL ABDOMINAL  1980    OTHER SURGICAL HISTORY  05/28/2014    phacoemulsification of cataract with intraocular lens implant right eye    WATER AND DRINK DAILY  Patient taking differently: as needed POUR 17GM OF POWDER INTO   MEASURING CAP STIR INTO 8OZOF WATER AND DRINK DAILY 7/3/17   Chelsea Story MD   calcium carbonate 600 MG TABS tablet Take 1 tablet by mouth daily 7/6/15   Simran Kidd MD   Multiple Vitamins-Minerals (THERAPEUTIC MULTIVITAMIN-MINERALS) tablet Take 1 tablet by mouth daily 7/13/15   Simran Kidd MD   fish oil-omega-3 fatty acids 1000 MG capsule Take 1 g by mouth daily     Historical Provider, MD       Allergies:  Sulfamethoxazole; Trimethoprim; Wellbutrin [bupropion hcl]; Advil [ibuprofen micronized]; Bactrim; Erythromycin; and Guaifenesin er    Social History:  The patient currently lives at home with her son     TOBACCO:   reports that she has never smoked. She has never used smokeless tobacco.  ETOH:   reports that she does not drink alcohol. Family History:   Positive as follows:        Problem Relation Age of Onset    Cancer Mother     Stroke Father        REVIEW OF SYSTEMS:     Constitutional: Negative for fever   HENT: Negative for sore throat   Eyes: Negative for redness   Respiratory: Negative  for dyspnea, cough   Cardiovascular: Negative for chest pain   Gastrointestinal: Negative for vomiting, diarrhea   Genitourinary: Negative for hematuria   Musculoskeletal: + arthralgias   Skin: Negative for rash   Neurological: + fall, Negative for syncope   Hematological: Negative for adenopathy   Psychiatric/Behavorial: Negative for anxiety    PHYSICAL EXAM:    BP (!) 101/50   Pulse 68   Temp 98.1 °F (36.7 °C) (Oral)   Resp 27   Ht 5' 2\" (1.575 m)   Wt 150 lb (68 kg)   SpO2 97%   BMI 27.44 kg/m²   Gen: Elderly female. No distress. Alert. Eyes: No conjunctival injection. ENT: No discharge. Pharynx clear. Neck: Trachea midline. Resp: No accessory muscle use. No crackles. No wheezes. No rhonchi. CV: Regular rate. Regular rhythm. No murmur. No rub. No edema.    Capillary Refill: Brisk,< 3 seconds   Peripheral Pulses: +2 palpable, equal bilaterally   GI: Non-tender. Non-distended. No masses. No organomegaly. Normal bowel sounds. No hernia. Skin: Warm and dry. M/S: + TTP to R hip and groin, externally rotated and shortened, good ROM of R ankle and toes   Neuro: Awake. Grossly nonfocal    Psych: Oriented. No anxiety or agitation. CBC:   Recent Labs     06/18/19  1247   WBC 9.4   HGB 11.6*   HCT 34.3*   MCV 95.3        BMP:   Recent Labs     06/18/19  1247      K 4.6      CO2 26   BUN 31*   CREATININE 0.8     LIVER PROFILE:   Recent Labs     06/18/19  1247   AST 33   ALT 36   LIPASE 32.0   BILIDIR <0.2   BILITOT 0.4   ALKPHOS 77     PT/INR:   Recent Labs     06/18/19  1247   PROTIME 11.4   INR 1.00     APTT:   Recent Labs     06/18/19  1247   APTT 28.2       CULTURES  none    EKG:  I have reviewed the EKG with the following interpretation:   NSR, normal axis, normal interval, no acute ST segment changes concerning for acute ischemia     RADIOLOGY  CT Head WO Contrast   Final Result   No acute intracranial abnormality. CT ABDOMEN PELVIS W IV CONTRAST Additional Contrast? None   Final Result   Acute traumatic displaced right proximal femur fracture that involves the   lesser trochanter. No acute intra-abdominal or intrapelvic abnormality. Biliary ductal dilatation status post cholecystectomy, slightly increased   compared to prior from 2017. Moderate to large stool burden consistent with constipation. XR FEMUR RIGHT (MIN 2 VIEWS)   Final Result   Acute traumatic mildly displaced right subtrochanteric femur fracture. XR HIP RIGHT (2-3 VIEWS)   Final Result   Acute traumatic mildly displaced right subtrochanteric femur fracture. Pertinent previous results reviewed   Stress Test: 5/18/18  Summary    There is normal perfusion at rest and stress.  There is no stress induced    ischemia or infarct.    There are no regional wall motion with Dr. Sundar Gonzalez   - [facility] in May 2015 with non-obstructive CAD  - Normal stress in May 2018   - no acute ACS symptoms   - EKG wnl   - Continue Norvasc, ASA, BB    Constipation   - noted on CT   - Continue Fibercon     Bipolar DO  Depression  - On Symbyax at home  -Therapeutic interchange  Prozac and Zyprexa separately     GERD  - Continue PPI     HLD  - Continue Statin       DVT Prophylaxis: Lovenox   Diet: Diet NPO Effective Now   Code Status: Full Code    Patient is medically cleared for surgery     Consuelo Delgado PA-C  6/18/2019 4:20 PM      Nel Shields 6/18/2019 4:53 PM

## 2019-06-18 NOTE — ANESTHESIA PROCEDURE NOTES
Peripheral Block    Patient location during procedure: pre-op  Staffing  Anesthesiologist: Elpidio Camacho MD  Performed: anesthesiologist   Preanesthetic Checklist  Completed: patient identified, site marked, surgical consent, pre-op evaluation, timeout performed, IV checked, risks and benefits discussed, monitors and equipment checked, anesthesia consent given, oxygen available and patient being monitored  Peripheral Block  Patient position: supine  Prep: alcohol swabs  Patient monitoring: continuous pulse ox and IV access  Block type: Fascia iliaca  Laterality: right  Injection technique: single-shot  Procedures: ultrasound guided  Local infiltration: lidocaine  Infiltration strength: 1 %  Dose: 3 mL  Local infiltration: lidocaine  Needle  Needle type: combined needle/nerve stimulator   Needle gauge: 21 G  Needle length: 10 cm  Needle localization: ultrasound guidance  Assessment  Injection assessment: negative aspiration for heme, no paresthesia on injection and local visualized surrounding nerve on ultrasound  Paresthesia pain: none  Slow fractionated injection: yes  Hemodynamics: stable  Additional Notes  Immediately prior to procedure a \"time out\" was called to verify the correct patient, allergies, laterality, procedure and equipment. Time out performed with [facility]    Local Anesthetic: 0.125 %  Bupivacaine plus epi 1 to 400K  Amount: 60 ml  in 5 ml increments after negative aspiration each time.         Reason for block: post-op pain management and at surgeon's request

## 2019-06-19 ENCOUNTER — APPOINTMENT (OUTPATIENT)
Dept: GENERAL RADIOLOGY | Age: 79
DRG: 481 | End: 2019-06-19
Payer: MEDICARE

## 2019-06-19 LAB
ANION GAP SERPL CALCULATED.3IONS-SCNC: 12 MMOL/L (ref 3–16)
BASOPHILS ABSOLUTE: 0 K/UL (ref 0–0.2)
BASOPHILS RELATIVE PERCENT: 0.2 %
BLOOD BANK DISPENSE STATUS: NORMAL
BLOOD BANK DISPENSE STATUS: NORMAL
BLOOD BANK PRODUCT CODE: NORMAL
BLOOD BANK PRODUCT CODE: NORMAL
BPU ID: NORMAL
BPU ID: NORMAL
BUN BLDV-MCNC: 40 MG/DL (ref 7–20)
CALCIUM SERPL-MCNC: 7.9 MG/DL (ref 8.3–10.6)
CHLORIDE BLD-SCNC: 107 MMOL/L (ref 99–110)
CO2: 19 MMOL/L (ref 21–32)
CREAT SERPL-MCNC: 1.3 MG/DL (ref 0.6–1.2)
DESCRIPTION BLOOD BANK: NORMAL
DESCRIPTION BLOOD BANK: NORMAL
EOSINOPHILS ABSOLUTE: 0 K/UL (ref 0–0.6)
EOSINOPHILS RELATIVE PERCENT: 0 %
GFR AFRICAN AMERICAN: 48
GFR NON-AFRICAN AMERICAN: 39
GLUCOSE BLD-MCNC: 186 MG/DL (ref 70–99)
HCT VFR BLD CALC: 21.4 % (ref 36–48)
HEMOGLOBIN: 7.3 G/DL (ref 12–16)
LYMPHOCYTES ABSOLUTE: 1.1 K/UL (ref 1–5.1)
LYMPHOCYTES RELATIVE PERCENT: 11.2 %
MCH RBC QN AUTO: 32.6 PG (ref 26–34)
MCHC RBC AUTO-ENTMCNC: 34 G/DL (ref 31–36)
MCV RBC AUTO: 96.1 FL (ref 80–100)
MONOCYTES ABSOLUTE: 0.8 K/UL (ref 0–1.3)
MONOCYTES RELATIVE PERCENT: 8.7 %
NEUTROPHILS ABSOLUTE: 7.5 K/UL (ref 1.7–7.7)
NEUTROPHILS RELATIVE PERCENT: 79.9 %
PDW BLD-RTO: 13.9 % (ref 12.4–15.4)
PLATELET # BLD: 179 K/UL (ref 135–450)
PMV BLD AUTO: 7.3 FL (ref 5–10.5)
POTASSIUM REFLEX MAGNESIUM: 4.7 MMOL/L (ref 3.5–5.1)
RBC # BLD: 2.23 M/UL (ref 4–5.2)
SODIUM BLD-SCNC: 138 MMOL/L (ref 136–145)
WBC # BLD: 9.4 K/UL (ref 4–11)

## 2019-06-19 PROCEDURE — 2580000003 HC RX 258: Performed by: ORTHOPAEDIC SURGERY

## 2019-06-19 PROCEDURE — 36430 TRANSFUSION BLD/BLD COMPNT: CPT

## 2019-06-19 PROCEDURE — 85025 COMPLETE CBC W/AUTO DIFF WBC: CPT

## 2019-06-19 PROCEDURE — 36415 COLL VENOUS BLD VENIPUNCTURE: CPT

## 2019-06-19 PROCEDURE — 2580000003 HC RX 258: Performed by: INTERNAL MEDICINE

## 2019-06-19 PROCEDURE — 94761 N-INVAS EAR/PLS OXIMETRY MLT: CPT

## 2019-06-19 PROCEDURE — 1200000000 HC SEMI PRIVATE

## 2019-06-19 PROCEDURE — 71045 X-RAY EXAM CHEST 1 VIEW: CPT

## 2019-06-19 PROCEDURE — 6370000000 HC RX 637 (ALT 250 FOR IP): Performed by: ORTHOPAEDIC SURGERY

## 2019-06-19 PROCEDURE — 99233 SBSQ HOSP IP/OBS HIGH 50: CPT | Performed by: INTERNAL MEDICINE

## 2019-06-19 PROCEDURE — 80048 BASIC METABOLIC PNL TOTAL CA: CPT

## 2019-06-19 PROCEDURE — 6360000002 HC RX W HCPCS: Performed by: ORTHOPAEDIC SURGERY

## 2019-06-19 PROCEDURE — P9016 RBC LEUKOCYTES REDUCED: HCPCS

## 2019-06-19 PROCEDURE — 2700000000 HC OXYGEN THERAPY PER DAY

## 2019-06-19 RX ORDER — 0.9 % SODIUM CHLORIDE 0.9 %
250 INTRAVENOUS SOLUTION INTRAVENOUS ONCE
Status: COMPLETED | OUTPATIENT
Start: 2019-06-19 | End: 2019-06-19

## 2019-06-19 RX ORDER — 0.9 % SODIUM CHLORIDE 0.9 %
250 INTRAVENOUS SOLUTION INTRAVENOUS ONCE
Status: COMPLETED | OUTPATIENT
Start: 2019-06-19 | End: 2019-06-20

## 2019-06-19 RX ADMIN — SODIUM CHLORIDE 250 ML: 9 INJECTION, SOLUTION INTRAVENOUS at 14:42

## 2019-06-19 RX ADMIN — CEFAZOLIN 1 G: 1 INJECTION, POWDER, FOR SOLUTION INTRAMUSCULAR; INTRAVENOUS at 09:40

## 2019-06-19 RX ADMIN — Medication 10 ML: at 09:40

## 2019-06-19 RX ADMIN — MORPHINE SULFATE 2 MG: 4 INJECTION INTRAVENOUS at 02:33

## 2019-06-19 RX ADMIN — SODIUM CHLORIDE: 9 INJECTION, SOLUTION INTRAVENOUS at 20:00

## 2019-06-19 RX ADMIN — HYDROCODONE BITARTRATE AND ACETAMINOPHEN 1 TABLET: 5; 325 TABLET ORAL at 07:42

## 2019-06-19 RX ADMIN — MORPHINE SULFATE 2 MG: 4 INJECTION INTRAVENOUS at 13:03

## 2019-06-19 RX ADMIN — CEFAZOLIN 1 G: 1 INJECTION, POWDER, FOR SOLUTION INTRAMUSCULAR; INTRAVENOUS at 02:36

## 2019-06-19 RX ADMIN — HYDROCODONE BITARTRATE AND ACETAMINOPHEN 1 TABLET: 5; 325 TABLET ORAL at 00:12

## 2019-06-19 RX ADMIN — MORPHINE SULFATE 2 MG: 4 INJECTION INTRAVENOUS at 16:40

## 2019-06-19 RX ADMIN — FLUOXETINE 20 MG: 20 CAPSULE ORAL at 20:00

## 2019-06-19 RX ADMIN — MORPHINE SULFATE 2 MG: 4 INJECTION INTRAVENOUS at 21:58

## 2019-06-19 RX ADMIN — HYDROCODONE BITARTRATE AND ACETAMINOPHEN 1 TABLET: 5; 325 TABLET ORAL at 14:40

## 2019-06-19 RX ADMIN — MORPHINE SULFATE 2 MG: 4 INJECTION INTRAVENOUS at 09:53

## 2019-06-19 RX ADMIN — SODIUM CHLORIDE 500 ML: 9 INJECTION, SOLUTION INTRAVENOUS at 00:12

## 2019-06-19 RX ADMIN — SODIUM CHLORIDE 250 ML: 9 INJECTION, SOLUTION INTRAVENOUS at 09:55

## 2019-06-19 RX ADMIN — ASPIRIN 81 MG: 81 TABLET, COATED ORAL at 09:39

## 2019-06-19 RX ADMIN — PANTOPRAZOLE SODIUM 40 MG: 40 TABLET, DELAYED RELEASE ORAL at 07:42

## 2019-06-19 RX ADMIN — MORPHINE SULFATE 2 MG: 4 INJECTION INTRAVENOUS at 18:40

## 2019-06-19 ASSESSMENT — PAIN SCALES - WONG BAKER
WONGBAKER_NUMERICALRESPONSE: 0
WONGBAKER_NUMERICALRESPONSE: 8
WONGBAKER_NUMERICALRESPONSE: 10
WONGBAKER_NUMERICALRESPONSE: 0
WONGBAKER_NUMERICALRESPONSE: 8
WONGBAKER_NUMERICALRESPONSE: 6
WONGBAKER_NUMERICALRESPONSE: 6
WONGBAKER_NUMERICALRESPONSE: 2
WONGBAKER_NUMERICALRESPONSE: 0
WONGBAKER_NUMERICALRESPONSE: 4
WONGBAKER_NUMERICALRESPONSE: 6
WONGBAKER_NUMERICALRESPONSE: 8
WONGBAKER_NUMERICALRESPONSE: 6

## 2019-06-19 ASSESSMENT — PAIN SCALES - GENERAL
PAINLEVEL_OUTOF10: 10
PAINLEVEL_OUTOF10: 9
PAINLEVEL_OUTOF10: 8
PAINLEVEL_OUTOF10: 10
PAINLEVEL_OUTOF10: 6

## 2019-06-19 ASSESSMENT — PAIN DESCRIPTION - ONSET
ONSET: ON-GOING

## 2019-06-19 ASSESSMENT — PAIN DESCRIPTION - LOCATION
LOCATION: HIP
LOCATION: HIP;LEG
LOCATION: HIP
LOCATION: HIP

## 2019-06-19 ASSESSMENT — PAIN DESCRIPTION - PROGRESSION
CLINICAL_PROGRESSION: GRADUALLY WORSENING
CLINICAL_PROGRESSION: GRADUALLY WORSENING
CLINICAL_PROGRESSION: RAPIDLY IMPROVING
CLINICAL_PROGRESSION: GRADUALLY IMPROVING
CLINICAL_PROGRESSION: GRADUALLY IMPROVING
CLINICAL_PROGRESSION: GRADUALLY WORSENING
CLINICAL_PROGRESSION: RAPIDLY WORSENING
CLINICAL_PROGRESSION: RAPIDLY WORSENING
CLINICAL_PROGRESSION: GRADUALLY WORSENING
CLINICAL_PROGRESSION: GRADUALLY IMPROVING

## 2019-06-19 ASSESSMENT — PAIN DESCRIPTION - PAIN TYPE
TYPE: SURGICAL PAIN
TYPE: ACUTE PAIN
TYPE: SURGICAL PAIN

## 2019-06-19 ASSESSMENT — PAIN DESCRIPTION - ORIENTATION
ORIENTATION: RIGHT

## 2019-06-19 ASSESSMENT — PAIN DESCRIPTION - FREQUENCY
FREQUENCY: CONTINUOUS

## 2019-06-19 ASSESSMENT — PAIN DESCRIPTION - DESCRIPTORS
DESCRIPTORS: DISCOMFORT;CONSTANT
DESCRIPTORS: ACHING;DISCOMFORT
DESCRIPTORS: DISCOMFORT
DESCRIPTORS: DISCOMFORT;CONSTANT
DESCRIPTORS: ACHING;DISCOMFORT
DESCRIPTORS: ACHING;SHARP;DISCOMFORT
DESCRIPTORS: DISCOMFORT;CONSTANT

## 2019-06-19 NOTE — PROGRESS NOTES
Pt dressing has increased bloody drainage. Charge and clinical notified. Lab called for morning labs. Pt is complaining of pain but no prn meds available. Ice and warm blanket provided.

## 2019-06-19 NOTE — PROGRESS NOTES
Pt a/o x1 to self still. BP improving. 1 units of PRBC's completed at this time. Dr. Hali Lawrence wants a 2 unit of PRBC's with repeat back due to increased bleeding from surgical site. Call light within reach. Bed alarm is on. Will monitor.       06/19/19 1330   Vital Signs   Temp 97.2 °F (36.2 °C)   Temp Source Oral   Pulse 87   Resp 14   /64   BP Location Right lower arm   BP Upper/Lower Lower   Level of Consciousness 0   MEWS Score 0   Oxygen Therapy   SpO2 95 %   O2 Device Nasal cannula   O2 Flow Rate (L/min) 2 L/min

## 2019-06-19 NOTE — DISCHARGE INSTR - COC
Continuity of Care Form    Patient Name: Savannah Montero   :  1940  MRN:  [id number]    Admit date:  2019  Discharge date:  2019    Code Status Order: Full Code   Advance Directives:   Advance Care Flowsheet Documentation     Date/Time Healthcare Directive Type of Healthcare Directive Copy in [address] Agent's Name Healthcare Agent's Phone Number    [phone]  Yes, patient has an advance directive for healthcare treatment  Durable power of  for health care;Living will  No, copy requested from medical records  --  --  --          Admitting Physician:  Cheryl Boateng MD  PCP: Francie Krishnan MD    Discharging Nurse: [facility] Unit/Room#: 0221/0221-02  Discharging Unit Phone Number: [phone]    Emergency Contact:   Extended Emergency Contact Information  Primary Emergency Contact: Camden Hayes [facility])  Address: [address] Phone: [phone]  Relation: Child    Past Surgical History:  Past Surgical History:   Procedure Laterality Date    CATARACT REMOVAL WITH IMPLANT Left 2014    CHOLECYSTECTOMY      COLON SURGERY      COLONOSCOPY  2015    diverticulosis    CORONARY ANGIOPLASTY      CYSTOSCOPY Right 2017    Cysto, retro, ureteroscopy.  Stone manipulation with out extraction right stent placement     FEMUR FRACTURE SURGERY Right 2019    INTRAMEDULLARY NAILING performed by You Jewell MD at [address] Right 2019    INTRAMEDULLARY NAILING (RIGHT HIP)    HYSTERECTOMY, TOTAL ABDOMINAL  1980    OTHER SURGICAL HISTORY  2014    phacoemulsification of cataract with intraocular lens implant right eye    VARICOSE VEIN SURGERY         Immunization History:   Immunization History   Administered Date(s) Administered    Influenza Virus Vaccine 2014, 2015    Influenza, High Dose (Fluzone 65 yrs and older) 11/18/2016    Influenza, MDCK Quadv, IM, PF (Flucelvax 4 yrs and older) 11/12/2017    Influenza, [place], 6 mo and older, IM, PF (Flulaval, Fluarix) 11/20/2018    Pneumococcal Conjugate 13-valent (Yznqzzb20) 05/11/2016    Pneumococcal Conjugate 7-valent (Prevnar7) 10/25/2004, 10/15/2009    Pneumococcal Polysaccharide (Ixqsurefy75) 07/02/2015    Tdap (Boostrix, Adacel) 07/29/2014       Active Problems:  Patient Active Problem List   Diagnosis Code    GERD (gastroesophageal reflux disease) K21.9    Urge urinary incontinence N39.41    Bipolar disorder ([facility] F31.9    Chest pain R07.9    Essential hypertension I10    Hyperlipidemia E78.5    Coronary artery disease involving native coronary artery of native heart with angina pectoris ([facility] I25.119    Bipolar disorder, in full remission, most recent episode depressed ([facility] [facility] F31.76    Cardiac microvascular disease I25.89    HASKINS (dyspnea on exertion) R06.09    Pyelonephritis N12    Fever R50.9    Renal calculi N20.0    Abnormal CXR R93.89    Epigastric pain R10.13    Thyroid nodule E04.1    Elevated LFTs R94.5    Primary osteoarthritis involving multiple joints M15.0    Hypertensive urgency I16.0    Closed fracture of right femur (HCC) S72.91XA    Acute blood loss anemia D62    Hypotension I95.9       Isolation/Infection:   Isolation          No Isolation            Nurse Assessment:  Last Vital Signs: BP (!) 89/56   Pulse 77   Temp 97.3 °F (36.3 °C) (Oral)   Resp 14   Ht 5' 2\" (1.575 m)   Wt 159 lb 12.8 oz (72.5 kg)   SpO2 97%   BMI 29.23 kg/m²     Last documented pain score (0-10 scale): Pain Level: 10  Last Weight:   Wt Readings from Last 1 Encounters:   06/19/19 159 lb 12.8 oz (72.5 kg)     Mental Status:  disoriented and alert    IV Access:  - None    Nursing Mobility/ADLs:  Walking   Dependent  Transfer  Dependent  Bathing  Dependent  Dressing  Dependent  Toileting  Dependent  Feeding Dependent  Med Admin  Dependent  Med Delivery   crushed and prefers mixed with applesauce    Wound Care Documentation and Therapy:        Elimination:  Continence:   · Bowel: No  · Bladder: No  Urinary Catheter: None   Colostomy/Ileostomy/Ileal Conduit: No       Date of Last BM: 6/20/19      Intake/Output Summary (Last 24 hours) at 6/19/2019 1230  Last data filed at 6/19/2019 1028  Gross per 24 hour   Intake 4023 ml   Output 425 ml   Net 3598 ml     I/O last 3 completed shifts: In: 1800 [I.V.:1800]  Out: 425 [Urine:175; Blood:250]    Safety Concerns: At Risk for Falls    Impairments/Disabilities:      None    Nutrition Therapy:  Current Nutrition Therapy:   - Oral Diet:  General    Routes of Feeding: Oral  Liquids: Thin Liquids  Daily Fluid Restriction: no  Last Modified Barium Swallow with Video (Video Swallowing Test): not done    Treatments at the Time of Hospital Discharge:   Respiratory Treatments: n/a    Oxygen Therapy:  is on oxygen at 1 L/min per nasal cannula.   Ventilator:    - No ventilator support    Rehab Therapies: Physical Therapy and Occupational Therapy  Weight Bearing Status/Restrictions: Touchdown weight bearing (10-25 lbs) only on right leg  Other Medical Equipment (for information only, NOT a DME order):  walker  Other Treatments: n/a    Patient's personal belongings (please select all that are sent with patient):  None    RN SIGNATURE:  Electronically signed by Latia Montano RN on 6/21/19 at 5:04 PM    CASE MANAGEMENT/SOCIAL WORK SECTION    Inpatient Status Date: 6/18/2019    Readmission Risk Assessment Score:  Readmission Risk              Risk of Unplanned Readmission:        16           Discharging to Facility/ Agency   · Name: [facility]  · Address: [address] [place] [address], [facility]  · Phone: [phone]  · Fax: [phone]    Dialysis Facility (if applicable)   · Name:  · Address:  · Dialysis Schedule:  · Phone:  · Fax:    /

## 2019-06-19 NOTE — PROGRESS NOTES
FLUoxetine  20 mg Oral Nightly    polycarbophil  625 mg Oral Daily    ceFAZolin  1 g Intravenous Q8H       Continuous Infusions:   sodium chloride 125 mL/hr at 06/18/19 2118       PRN Meds:  HYDROcodone-acetaminophen, melatonin, sodium chloride flush, acetaminophen, ondansetron, morphine      Data:  CBC:   Recent Labs     06/18/19  1247 06/19/19  0517   WBC 9.4 9.4   HGB 11.6* 7.3*   HCT 34.3* 21.4*   MCV 95.3 96.1    179     BMP:   Recent Labs     06/18/19  1247 06/19/19  0517    138   K 4.6 4.7    107   CO2 26 19*   BUN 31* 40*   CREATININE 0.8 1.3*     LIVER PROFILE:   Recent Labs     06/18/19  1247   AST 33   ALT 36   LIPASE 32.0   BILIDIR <0.2   BILITOT 0.4   ALKPHOS 77     PT/INR:   Recent Labs     06/18/19  1247   PROTIME 11.4   INR 1.00     Cultures: none      Assessment:  Principal Problem:    Closed fracture of right femur (HCC)  Active Problems:    GERD (gastroesophageal reflux disease)    Bipolar disorder (HCC)    Essential hypertension    Hyperlipidemia    Coronary artery disease involving native coronary artery of native heart with angina pectoris ([facility] [address]  Resolved Problems:    * No resolved hospital problems. *      Plan:    R displaced subtrochanteric femur fracture   - IM nailing done on 6/18/19.  - Post op day # 1. Acute blood loss anemia  - due to fracture. - she has had some bleeding from surgery site. I told RN to call ortho. - I will give her one unit of PRBC.     HTN  Non-obstructive CAD   - NSTEMI May 2015--follows with Dr. Dori Crawford   - Peewee Minors in May 2015 with non-obstructive CAD  - Normal stress in May 2018   - no acute ACS symptoms   - EKG wnl   - BP low normal. Continue IVF.   Hold Norvasc and BB.     Constipation   - noted on CT   - Continue Fibercon      Bipolar DO  Depression  - On Symbyax at home  -Therapeutic interchange  Prozac and Zyprexa separately      GERD  - Continue PPI      HLD  - Continue Statin         DVT Prophylaxis: Lovenox - will hold till ortho sees patient.   Diet: Diet NPO Effective Now   Code Status: Full Code             Arlen Shi MD 6/19/2019 8:18 AM

## 2019-06-19 NOTE — PROGRESS NOTES
Pt a/o to self only. Pt climbing around in bed and stating \"Help me\". Per FACEs scale of 6/10, PRN morphine 2 mg IV given. Pt stated she had to have a BM, put on bedpan with no BM after 10 minutes. Pt removed and repositioned with pillow support. Dressing remains intact with no drainage. Call light within reach. Bed alarm is on. Tele-sitter is on. Will monitor.       06/19/19 1840   Vital Signs   Patient Currently in Pain Yes   Pain Assessment   Pain Assessment Faces   Pain Location Hip   Pain Orientation Right   Pain Type Surgical pain   Pain Descriptors Discomfort   Pain Onset On-going   Pain Frequency Continuous   Alanis-Baker Pain Rating 6   Clinical Progression Gradually worsening

## 2019-06-19 NOTE — PLAN OF CARE
Problem: Falls - Risk of:  Goal: Will remain free from falls  Description  Will remain free from falls  Outcome: Ongoing  Goal: Absence of physical injury  Description  Absence of physical injury  Outcome: Ongoing     Problem: Risk for Impaired Skin Integrity  Goal: Tissue integrity - skin and mucous membranes  Description  Structural intactness and normal physiological function of skin and  mucous membranes.   Outcome: Ongoing     Problem: Infection:  Goal: Will remain free from infection  Description  Will remain free from infection  Outcome: Ongoing     Problem: Pain:  Goal: Patient's pain/discomfort is manageable  Description  Patient's pain/discomfort is manageable  Outcome: Ongoing  Goal: Pain level will decrease  Description  Pain level will decrease  Outcome: Ongoing  Goal: Control of acute pain  Description  Control of acute pain  Outcome: Ongoing  Goal: Control of chronic pain  Description  Control of chronic pain  Outcome: Ongoing     Problem: Injury - Risk of, Physical Injury:  Goal: Will remain free from falls  Description  Will remain free from falls  Outcome: Ongoing  Goal: Absence of physical injury  Description  Absence of physical injury  Outcome: Ongoing

## 2019-06-19 NOTE — PROGRESS NOTES
Shift assessment completed:    Alert and Oriented x1 to self only, disoriented otherwise. Vitals are BP improving. Respiratory status is regular, unlabored at rest, SOB and increase work of breathing due to anxiety, and SpO2 is 97% on 2/L O2 via NC. Right hip dressing in place, removed pressure dressing from over night, 2 dressing in place had moderate drainage to mepilex. Pt able to wiggle toes, ace wrap remains in place from thigh to toes. Will wait on ortho to round. Called son Obdulia Hinojosa to get consent for blood, awaiting for call back due to patient confusion. Pain is 6/10, located in right hip per FACEs scale, PRN morphine 2mg IV given at this time. SR up 3/4. Bed in lowest position. Call light within reach. Bed alarm is on. Will monitor.       06/19/19 0936   Vital Signs   Temp 97.7 °F (36.5 °C)   Temp Source Oral   Pulse 87   Heart Rate Source Monitor   Resp 18   /66   BP Location Left upper arm   BP Upper/Lower Upper   Patient Position High fowlers   Level of Consciousness 0   MEWS Score 1   Patient Currently in Pain Yes   Pain Assessment   Pain Assessment Faces   Pain Location Hip   Pain Orientation Right   Pain Type Surgical pain   Pain Descriptors Aching;Discomfort   Pain Onset On-going   Pain Frequency Continuous   Alanis-Baker Pain Rating 6   Clinical Progression Gradually worsening   Oxygen Therapy   SpO2 97 %   O2 Device Nasal cannula   O2 Flow Rate (L/min) 2 L/min

## 2019-06-19 NOTE — PROGRESS NOTES
Physical Therapy/Occupational Therapy  Spoke with RN who recommends holding PT/OT evaluations today due to pt's status and need for blood transfusion. Will follow up with pt tomorrow as appropriate.      Zaida Starr, PT, DPT #[id number]  Jaki Serra OTR/L [id number]

## 2019-06-19 NOTE — PROGRESS NOTES
Tristan Hernandez from ortho called back with instruction to change dressing and apply a 6\" ace wrap from toes to thigh. Sx incision is draining steadily so pressure dressing applied over the mepilex then ace applied. Pt on bedpan to attempt to have BM. States her pain is better.

## 2019-06-19 NOTE — PROGRESS NOTES
MD notified regarding pt low BP. Pt at baseline mentation. Pulse 74 and weak. Denies pain. Will monitor for changes.

## 2019-06-19 NOTE — CARE COORDINATION
Case Management Assessment  Initial Evaluation    Date/Time of Evaluation: 6/19/2019 12:15 PM  Assessment Completed by: Jan Claire    Patient Name: Shayne Alejandro  YOB: 1940  Diagnosis: Closed fracture of right femur ([address] Virl Kid  Date / Time: 6/18/2019 12:19 PM  Admission status/Date: Inpatient 6/18/2019  Chart Reviewed: Yes      Patient Interviewed: Yes   Family Interviewed:  Yes - Rubén Goldstein (son)      Hospitalization in the last 30 days:  No    Contacts  :   Annette Gonzalez  Relationship to Patient:  son, POA  Alternate Contact:   [phone]  Relationship to Patient:     Phone Number:       Met with: patient and discussed DCP w/son, Rubén Goldstein w/pt's permission    Current PCP: Luciana Araiza    Financial  Medicare  Precert required for SNF : [facility]upal Calderon        3 night stay required - Y, N    ADLS  Support Systems: Children  Transportation: family    Meal Preparation: family    Housing  Home Environment: Lives with son, (caregiver) +24/7 supervision  Steps: 6  Plans to Return to Present Housing: after rehab  Other Identified Issues:     [address] 76  Currently active with [address] : No  Type of Home Care Services: [facility]  Passport/Waiver : No  :                      Phone Number:    Passport/Waiver Services: NA    +[facility]          Durable Medical Equipment   DME Provider:   Equipment: Walker__Cane__RTS__ BSC__Shower Chair__  02__ HHN__ CPAP__  BiPap__  Hospital Bed__ W/C___ Other__________      Has Home O2 in place on admit:  No  Informed of need to bring portable home O2 tank on day of discharge for nursing to connect prior to leaving:   No  Verbalized agreement/Understanding:   No    Community Service Affiliation  Dialysis:  No    · Name:  · Location  · Dialysis Schedule:  · Phone:   · Fax:     Outpatient PT/OT: No    Cancer Center: No     CHF Clinic: No     Pulmonary Rehab: No  Pain Clinic: No  Community Mental Health: No    Wound Clinic: No     Other: NA    DISCHARGE PLAN: Chart reviewed. Met with pt at bedside and explained the role of the CM. Plan: CM spoke w/pt's son with pt's permission regarding DCP. Son chooses [facility] for STR if SNF recommended. Referral faxed to [facility] for review. +eCOC, +HENS, +CM following    1450 Addendum TC from [facility] @ [facility] and she can accept the pt. +bed    Explained Case Management role/services.

## 2019-06-19 NOTE — PROGRESS NOTES
Pt would only take PO asa this am. Held all other oral meds. IV Ancef hung. Holding SC Lovenox per Dr. Della Trujillo.

## 2019-06-19 NOTE — OP NOTE
[address]                 [address] [address]                                OPERATIVE REPORT    PATIENT NAME: Iron Nazario                 :        1940  MED REC NO:   [id number]                          ROOM:       0221  ACCOUNT NO:   [de-identified]                           ADMIT DATE: 2019  PROVIDER:     Alma Jacobson MD    DATE OF PROCEDURE:  2019    PREOPERATIVE DIAGNOSIS:  Right comminuted subtroch fracture. POSTOPERATIVE DIAGNOSIS:  Right comminuted subtroch fracture. OPERATION PERFORMED:  Open reduction intramedullary nailing using  Synthes TFM 11 x 380, 130-degree angle nail, locked proximal and distal.    PRIMARY SURGEON:  Alma Jacobson MD.    ANESTHESIA:  General with block. DRAINS:  None. TUBES:  None. SPECIMENS:  None. ESTIMATED BLOOD LOSS:  150 mL. TOURNIQUET TIME:  Zero. INDICATIONS:  The patient is an 78-year-old female who fell this morning  sustaining a right subtroch fracture. She has been medically optimized,  presents now for open reduction IM nailing. Risks and benefits of  surgery were explained to the patient and her family. Informed consent  was signed in the chart prior to the surgery. OPERATIVE PROCEDURE:  The patient was brought to the operating room and  after adequate general anesthesia, placed on the fracture table. Her  right lower extremity was prepped and draped in sterile fashion after  her hip was reduced under image intensification and there was noted this  to be a significant displacement anterior to posterior. Because of  that, it was felt that open reduction would be warranted. Once her hip  was prepped and draped, a longitudinal incision was made two  fingerbreadths above the greater trochanter along the lateral aspect of  the femur to just below the distal end of the fracture. This was  carried down through the subcutaneous tissues using a Bovie.   The

## 2019-06-19 NOTE — PROGRESS NOTES
Acknowledgment of consent obtained for blood transfusion from [id number] [facility] via telephone. Denied any further questions.

## 2019-06-19 NOTE — PROGRESS NOTES
Pt's son Mary Guadarrama at bedside. Mary Guadarrama is going home and will see pt tomorrow.  [phone]

## 2019-06-19 NOTE — PROGRESS NOTES
Pt a/o to self only. 2 unit of PRBC's finished at this time. PIV flushed and IVF's started back. BP still improving. Pt right hip dressing saturated with large amount of bloody drainage. BIMAL Fritz updated and stated to change dressing as needed. New dressing with dry gauze, abd pads, and surgical tape applied to right hip and then wrapped with ace wrap from groin to toes. Call light within reach. Bed alarm is on. Tele-sitter is on. Will monitor.       06/19/19 1719   Vital Signs   Temp 99 °F (37.2 °C)   Temp Source Oral   Pulse 92   Resp 18   /69   BP Location Left upper arm   BP Upper/Lower Upper   Patient Position Semi fowlers   Level of Consciousness 0   MEWS Score 1   Oxygen Therapy   SpO2 95 %   O2 Device Nasal cannula   O2 Flow Rate (L/min) 2 L/min

## 2019-06-19 NOTE — PLAN OF CARE
Problem: Falls - Risk of:  Goal: Will remain free from falls  Description  Will remain free from falls  6/19/2019 1952 by Jkae Diaz RN  Outcome: Ongoing     Problem: Risk for Impaired Skin Integrity  Goal: Tissue integrity - skin and mucous membranes  Description  Structural intactness and normal physiological function of skin and  mucous membranes.   6/19/2019 1952 by Jake Diaz RN  Outcome: Ongoing     Problem: Infection:  Goal: Will remain free from infection  Description  Will remain free from infection  6/19/2019 1952 by Jake Diaz RN  Outcome: Ongoing     Problem: Daily Care:  Goal: Daily care needs are met  Description  Daily care needs are met  Outcome: Ongoing     Problem: Injury - Risk of, Physical Injury:  Goal: Will remain free from falls  Description  Will remain free from falls  6/19/2019 1952 by Jake Diaz RN  Outcome: Ongoing

## 2019-06-19 NOTE — PROGRESS NOTES
Department of Orthopedic Surgery  Nurse Practitioner   Progress Note    Subjective:     Post-Operative Day: 1 Status Post right hip IM nail  Systemic or Specific Complaints: Patient seen up in bed. Patient not verbalizing much, but alert, able to questions. Bloody drainage noted to the distal portion of the midline RN at bedside. No family at bedside.     Objective:     Patient Vitals for the past 24 hrs:   BP Temp Temp src Pulse Resp SpO2 Weight   06/19/19 1300 131/70 -- -- 91 -- -- --   06/19/19 1214 (!) 89/56 97.3 °F (36.3 °C) Oral 77 14 97 % --   06/19/19 1104 91/61 98.4 °F (36.9 °C) Axillary 81 16 98 % --   06/19/19 1038 96/61 96.8 °F (36 °C) Oral 81 16 98 % --   06/19/19 0936 102/66 97.7 °F (36.5 °C) Oral 87 18 97 % --   06/19/19 0716 98/63 97.8 °F (36.6 °C) Oral 78 18 99 % --   06/19/19 0445 88/62 -- -- -- -- -- --   06/19/19 0245 95/60 97.5 °F (36.4 °C) Oral 82 20 99 % 159 lb 12.8 oz (72.5 kg)   06/19/19 0037 132/68 97.6 °F (36.4 °C) Oral 66 18 95 % --   06/18/19 2330 (!) 72/52 97.3 °F (36.3 °C) Oral 70 19 97 % --   06/18/19 2250 (!) 70/52 -- -- -- -- -- --   06/18/19 2239 -- 96.9 °F (36.1 °C) Oral 74 18 96 % --   06/18/19 2130 (!) 98/53 98 °F (36.7 °C) Oral 64 20 98 % --   06/18/19 2119 (!) 107/46 97.6 °F (36.4 °C) Temporal 73 22 99 % --   06/18/19 2115 (!) 94/46 -- -- 65 18 99 % --   06/18/19 2100 (!) 97/43 -- -- 61 18 100 % --   06/18/19 2045 (!) 98/49 -- -- 64 20 95 % --   06/18/19 2030 (!) 87/44 -- -- 65 19 100 % --   06/18/19 2015 (!) 107/51 -- -- 65 18 94 % --   06/18/19 2010 (!) 93/44 99.7 °F (37.6 °C) Temporal 71 14 93 % --   06/18/19 2005 (!) 109/52 -- -- 67 22 95 % --   06/18/19 2000 (!) 95/47 -- -- 68 23 92 % --   06/18/19 1955 (!) 93/44 -- -- 71 22 92 % --   06/18/19 1953 (!) 95/47 99.7 °F (37.6 °C) Temporal 65 18 94 % --   06/18/19 1630 (!) 76/51 97.4 °F (36.3 °C) Oral 66 18 95 % --   06/18/19 1507 (!) 101/50 -- -- 68 27 97 % --   06/18/19 1504 (!) 79/49 -- -- 66 25 94 % --   06/18/19 1435 107/74 -- -- 68 22 99 % --   06/18/19 1434 107/74 -- -- 76 27 98 % --       General: alert, appears stated age, cooperative and no distress   Wound:  Bloody drainage to the distal portion of the mepilex. Incision with 2 areas draining bloody drainage, not significant   Motion: Painful range of Motion   DVT Exam: No evidence of DVT seen on physical exam.       NVI in lower extremity. Thigh swollen but soft. Moving foot and ankle. Dressing removed and changed with a pressure dressing. Ace wrap applied from groin to toes    Data Review  CBC:   Lab Results   Component Value Date    WBC 9.4 06/19/2019    RBC 2.23 06/19/2019    HGB 7.3 06/19/2019    HCT 21.4 06/19/2019     06/19/2019       Assessment:     Status Post right hip IM nail. Doing well postoperatively. Plan:      1: Continues current post-op course : Continue current plan of care per medicine. Postop labs reviewed. Acute blood loss anemia, but it after surgery. 1 unit PRBC ordered by medicine, and using currently. Monitor. 2:  Continue Deep venous thrombosis prophylaxis - Lovenox, hold today per medicine  3:  Continue physical therapy, OT, TTWB  4:  Continue Pain Control  5: Discharge pending eval and therapy and discharge planning, along with medical stability.     Raulito Romano CNP

## 2019-06-19 NOTE — PROGRESS NOTES
Pt a/o to self only at this time. VSS at this time. BP remaining low. Ortho okay with stopping fluids during blood transfusion. Transfusion started at 125 ml/hr into right hand #20. Pt educated on s/s during transfusion, no evidence of learning at this point. Ortho at the bedside to change dressing to right hip. Dressing now has gauze, abd pads, and surgical tape with ace wrap from thigh to toes on right side. Will remain at bedside for 15 minutes during transfusion. Call light within reach. Bed alarm is on. Will monitor.       06/19/19 1038   Vital Signs   Temp 96.8 °F (36 °C)   Temp Source Oral   Pulse 81   Heart Rate Source Monitor   Resp 16   BP 96/61   BP Location Right upper arm   BP Upper/Lower Upper   Patient Position Semi fowlers   Level of Consciousness 0   MEWS Score 2   Oxygen Therapy   SpO2 98 %   O2 Device Nasal cannula   O2 Flow Rate (L/min) 2 L/min

## 2019-06-19 NOTE — PROGRESS NOTES
Shift assessment completed. Pt is alert and oriented to situation person only at this time. Reoriented. Pt denies pain. Good pedal pulse cap refill and color to RLE. Full sensation reported. Bed locked. drainage X2 noted on dressings which are marked. Vital signs are WNL. Respirations are even & easy. No complaints voiced. Pt denies needs at this time. SR up x 2 and bed in low position. Call light is within reach. Will monitor.

## 2019-06-20 LAB
AMORPHOUS: ABNORMAL /HPF
ANION GAP SERPL CALCULATED.3IONS-SCNC: 14 MMOL/L (ref 3–16)
BACTERIA: ABNORMAL /HPF
BASOPHILS ABSOLUTE: 0 K/UL (ref 0–0.2)
BASOPHILS RELATIVE PERCENT: 0.1 %
BILIRUBIN URINE: NEGATIVE
BLOOD, URINE: ABNORMAL
BUN BLDV-MCNC: 43 MG/DL (ref 7–20)
CALCIUM SERPL-MCNC: 8.1 MG/DL (ref 8.3–10.6)
CASTS: ABNORMAL /LPF
CHLORIDE BLD-SCNC: 106 MMOL/L (ref 99–110)
CLARITY: ABNORMAL
CO2: 18 MMOL/L (ref 21–32)
COLOR: YELLOW
CREAT SERPL-MCNC: 1.1 MG/DL (ref 0.6–1.2)
EOSINOPHILS ABSOLUTE: 0 K/UL (ref 0–0.6)
EOSINOPHILS RELATIVE PERCENT: 0 %
EPITHELIAL CELLS, UA: ABNORMAL /HPF
GFR AFRICAN AMERICAN: 58
GFR NON-AFRICAN AMERICAN: 48
GLUCOSE BLD-MCNC: 121 MG/DL (ref 70–99)
GLUCOSE URINE: NEGATIVE MG/DL
HCT VFR BLD CALC: 23.7 % (ref 36–48)
HEMOGLOBIN: 8.1 G/DL (ref 12–16)
KETONES, URINE: 15 MG/DL
LEUKOCYTE ESTERASE, URINE: NEGATIVE
LYMPHOCYTES ABSOLUTE: 0.9 K/UL (ref 1–5.1)
LYMPHOCYTES RELATIVE PERCENT: 11.4 %
MCH RBC QN AUTO: 31.5 PG (ref 26–34)
MCHC RBC AUTO-ENTMCNC: 34.2 G/DL (ref 31–36)
MCV RBC AUTO: 92.1 FL (ref 80–100)
MICROSCOPIC EXAMINATION: YES
MONOCYTES ABSOLUTE: 0.8 K/UL (ref 0–1.3)
MONOCYTES RELATIVE PERCENT: 11 %
NEUTROPHILS ABSOLUTE: 5.8 K/UL (ref 1.7–7.7)
NEUTROPHILS RELATIVE PERCENT: 77.5 %
NITRITE, URINE: NEGATIVE
PDW BLD-RTO: 15.4 % (ref 12.4–15.4)
PH UA: 5.5 (ref 5–8)
PLATELET # BLD: 124 K/UL (ref 135–450)
PMV BLD AUTO: 7.7 FL (ref 5–10.5)
POTASSIUM REFLEX MAGNESIUM: 3.8 MMOL/L (ref 3.5–5.1)
PROTEIN UA: 30 MG/DL
RBC # BLD: 2.58 M/UL (ref 4–5.2)
RBC UA: ABNORMAL /HPF (ref 0–2)
RENAL EPITHELIAL, UA: ABNORMAL /HPF
SODIUM BLD-SCNC: 138 MMOL/L (ref 136–145)
SPECIFIC GRAVITY UA: 1.02 (ref 1–1.03)
URINE TYPE: ABNORMAL
UROBILINOGEN, URINE: 0.2 E.U./DL
WBC # BLD: 7.5 K/UL (ref 4–11)
WBC UA: ABNORMAL /HPF (ref 0–5)

## 2019-06-20 PROCEDURE — 6370000000 HC RX 637 (ALT 250 FOR IP): Performed by: INTERNAL MEDICINE

## 2019-06-20 PROCEDURE — 2580000003 HC RX 258: Performed by: ORTHOPAEDIC SURGERY

## 2019-06-20 PROCEDURE — 6370000000 HC RX 637 (ALT 250 FOR IP): Performed by: ORTHOPAEDIC SURGERY

## 2019-06-20 PROCEDURE — 97530 THERAPEUTIC ACTIVITIES: CPT

## 2019-06-20 PROCEDURE — 80048 BASIC METABOLIC PNL TOTAL CA: CPT

## 2019-06-20 PROCEDURE — 6360000002 HC RX W HCPCS: Performed by: ORTHOPAEDIC SURGERY

## 2019-06-20 PROCEDURE — 97165 OT EVAL LOW COMPLEX 30 MIN: CPT

## 2019-06-20 PROCEDURE — 51701 INSERT BLADDER CATHETER: CPT

## 2019-06-20 PROCEDURE — 81001 URINALYSIS AUTO W/SCOPE: CPT

## 2019-06-20 PROCEDURE — 97162 PT EVAL MOD COMPLEX 30 MIN: CPT

## 2019-06-20 PROCEDURE — 94761 N-INVAS EAR/PLS OXIMETRY MLT: CPT

## 2019-06-20 PROCEDURE — 99233 SBSQ HOSP IP/OBS HIGH 50: CPT | Performed by: INTERNAL MEDICINE

## 2019-06-20 PROCEDURE — 92610 EVALUATE SWALLOWING FUNCTION: CPT

## 2019-06-20 PROCEDURE — 6370000000 HC RX 637 (ALT 250 FOR IP): Performed by: NURSE PRACTITIONER

## 2019-06-20 PROCEDURE — 1200000000 HC SEMI PRIVATE

## 2019-06-20 PROCEDURE — 92526 ORAL FUNCTION THERAPY: CPT

## 2019-06-20 PROCEDURE — 85025 COMPLETE CBC W/AUTO DIFF WBC: CPT

## 2019-06-20 PROCEDURE — 6360000002 HC RX W HCPCS: Performed by: NURSE PRACTITIONER

## 2019-06-20 PROCEDURE — 36415 COLL VENOUS BLD VENIPUNCTURE: CPT

## 2019-06-20 PROCEDURE — 51798 US URINE CAPACITY MEASURE: CPT

## 2019-06-20 RX ORDER — MORPHINE SULFATE 4 MG/ML
2 INJECTION, SOLUTION INTRAMUSCULAR; INTRAVENOUS EVERY 4 HOURS PRN
Status: DISCONTINUED | OUTPATIENT
Start: 2019-06-20 | End: 2019-06-21 | Stop reason: HOSPADM

## 2019-06-20 RX ORDER — LORAZEPAM 0.5 MG/1
0.5 TABLET ORAL 2 TIMES DAILY
Status: ON HOLD | COMMUNITY
End: 2019-09-26 | Stop reason: HOSPADM

## 2019-06-20 RX ORDER — LORAZEPAM 0.5 MG/1
0.25 TABLET ORAL 2 TIMES DAILY PRN
Status: DISCONTINUED | OUTPATIENT
Start: 2019-06-20 | End: 2019-06-21 | Stop reason: HOSPADM

## 2019-06-20 RX ORDER — OXYCODONE HCL 5 MG/5 ML
5 SOLUTION, ORAL ORAL EVERY 4 HOURS PRN
Qty: 90 ML | Refills: 0 | Status: SHIPPED | OUTPATIENT
Start: 2019-06-20 | End: 2019-06-23

## 2019-06-20 RX ORDER — OXYCODONE HCL 5 MG/5 ML
5 SOLUTION, ORAL ORAL EVERY 4 HOURS PRN
Status: DISCONTINUED | OUTPATIENT
Start: 2019-06-20 | End: 2019-06-21 | Stop reason: HOSPADM

## 2019-06-20 RX ADMIN — SODIUM CHLORIDE: 9 INJECTION, SOLUTION INTRAVENOUS at 12:18

## 2019-06-20 RX ADMIN — AMLODIPINE BESYLATE 2.5 MG: 2.5 TABLET ORAL at 09:48

## 2019-06-20 RX ADMIN — MORPHINE SULFATE 2 MG: 4 INJECTION INTRAVENOUS at 05:04

## 2019-06-20 RX ADMIN — OXYCODONE HYDROCHLORIDE 5 MG: 5 SOLUTION ORAL at 14:46

## 2019-06-20 RX ADMIN — OXYCODONE HYDROCHLORIDE 5 MG: 5 SOLUTION ORAL at 18:59

## 2019-06-20 RX ADMIN — Medication 500 MG: at 09:48

## 2019-06-20 RX ADMIN — SODIUM CHLORIDE: 9 INJECTION, SOLUTION INTRAVENOUS at 01:04

## 2019-06-20 RX ADMIN — MORPHINE SULFATE 2 MG: 4 INJECTION INTRAVENOUS at 01:04

## 2019-06-20 RX ADMIN — SENNOSIDES AND DOCUSATE SODIUM 2 TABLET: 8.6; 5 TABLET ORAL at 09:47

## 2019-06-20 RX ADMIN — CALCIUM POLYCARBOPHIL 625 MG: 625 TABLET, FILM COATED ORAL at 09:51

## 2019-06-20 RX ADMIN — MORPHINE SULFATE 2 MG: 4 INJECTION INTRAVENOUS at 12:22

## 2019-06-20 RX ADMIN — POLYETHYLENE GLYCOL (3350) 17 G: 17 POWDER, FOR SOLUTION ORAL at 09:48

## 2019-06-20 RX ADMIN — Medication 10 ML: at 09:48

## 2019-06-20 RX ADMIN — LORAZEPAM 0.25 MG: 0.5 TABLET ORAL at 10:48

## 2019-06-20 RX ADMIN — MORPHINE SULFATE 2 MG: 4 INJECTION INTRAVENOUS at 08:19

## 2019-06-20 RX ADMIN — ENOXAPARIN SODIUM 40 MG: 100 INJECTION SUBCUTANEOUS at 09:48

## 2019-06-20 RX ADMIN — ASPIRIN 81 MG: 81 TABLET, COATED ORAL at 09:47

## 2019-06-20 RX ADMIN — FERROUS SULFATE TAB 325 MG (65 MG ELEMENTAL FE) 325 MG: 325 (65 FE) TAB at 09:47

## 2019-06-20 RX ADMIN — MORPHINE SULFATE 2 MG: 4 INJECTION INTRAVENOUS at 16:38

## 2019-06-20 RX ADMIN — OXYCODONE HYDROCHLORIDE 5 MG: 5 SOLUTION ORAL at 09:53

## 2019-06-20 RX ADMIN — METOPROLOL SUCCINATE 50 MG: 50 TABLET, EXTENDED RELEASE ORAL at 09:47

## 2019-06-20 ASSESSMENT — PAIN SCALES - WONG BAKER
WONGBAKER_NUMERICALRESPONSE: 6
WONGBAKER_NUMERICALRESPONSE: 6
WONGBAKER_NUMERICALRESPONSE: 4
WONGBAKER_NUMERICALRESPONSE: 2
WONGBAKER_NUMERICALRESPONSE: 6
WONGBAKER_NUMERICALRESPONSE: 8
WONGBAKER_NUMERICALRESPONSE: 6
WONGBAKER_NUMERICALRESPONSE: 8
WONGBAKER_NUMERICALRESPONSE: 6
WONGBAKER_NUMERICALRESPONSE: 4

## 2019-06-20 ASSESSMENT — PAIN DESCRIPTION - ONSET
ONSET: ON-GOING

## 2019-06-20 ASSESSMENT — PAIN DESCRIPTION - FREQUENCY
FREQUENCY: CONTINUOUS

## 2019-06-20 ASSESSMENT — PAIN DESCRIPTION - ORIENTATION
ORIENTATION: RIGHT

## 2019-06-20 ASSESSMENT — PAIN SCALES - GENERAL
PAINLEVEL_OUTOF10: 8
PAINLEVEL_OUTOF10: 7
PAINLEVEL_OUTOF10: 3
PAINLEVEL_OUTOF10: 2

## 2019-06-20 ASSESSMENT — PAIN DESCRIPTION - PAIN TYPE
TYPE: SURGICAL PAIN

## 2019-06-20 ASSESSMENT — PAIN DESCRIPTION - PROGRESSION
CLINICAL_PROGRESSION: GRADUALLY IMPROVING
CLINICAL_PROGRESSION: GRADUALLY WORSENING
CLINICAL_PROGRESSION: GRADUALLY IMPROVING
CLINICAL_PROGRESSION: GRADUALLY WORSENING

## 2019-06-20 ASSESSMENT — PAIN DESCRIPTION - LOCATION
LOCATION: HIP

## 2019-06-20 ASSESSMENT — PAIN DESCRIPTION - DESCRIPTORS
DESCRIPTORS: DISCOMFORT
DESCRIPTORS: DISCOMFORT
DESCRIPTORS: CONSTANT;DISCOMFORT
DESCRIPTORS: DISCOMFORT
DESCRIPTORS: DISCOMFORT

## 2019-06-20 NOTE — PROGRESS NOTES
Shift assessment completed:    Alert and Oriented x1 only to self, confused otherwise. Vitals are BP elevated. PT Is NPO for swallow eval due to severe choking overnight. No PO meds given. Respiratory status is regular, unlabored at rest, increase work of breathing due to anxiety, diminished to lower lobes, and SpO2 is 93% on 2/L O2 via NC. Right Hip IM Nailing, POD #2, no drainage noted to reinforced dressing. Pt able to wiggle toes. Ace wrap remains in place from groin to toes. Pedal pulse +2. Pain is FACEs scale of 6/10, PRN morphine 2 mg IV given. Pt repositioned. SR up 3/4. Bed in lowest position. Call light within reach. Bed alarm is on. Tele-sitter is on. Will monitor. 06/20/19 0817   Vital Signs   Temp 97.8 °F (36.6 °C)   Temp Source Axillary   Pulse 101   Heart Rate Source Monitor   Resp 22   BP (!) 166/94   BP Location Left lower arm   BP Upper/Lower Lower   Patient Position High fowlers   Level of Consciousness 0   MEWS Score 3   Patient Currently in Pain Yes   Pain Assessment   Pain Assessment Faces   Pain Location Hip   Pain Orientation Right   Pain Type Surgical pain   Pain Descriptors Constant; Discomfort  (\"Help me\" she yells out)   Pain Onset On-going   Pain Frequency Continuous   Alanis-Baker Pain Rating 6   Clinical Progression Gradually worsening   Oxygen Therapy   SpO2 93 %   O2 Device Nasal cannula   O2 Flow Rate (L/min) 2 L/min

## 2019-06-20 NOTE — PROGRESS NOTES
Progress Note    Admit Date:  6/18/2019    Subjective:  Ms. Sanjana Baugh had IM nailing of right intertrochanteric fracture. Nursing has noted some bleeding at surgical site. She has dropped her H and H. She did not sleep well. Her appetite is poor. No CP. No fever. Her SBP is low normal.    6/20- post op day# 2. She is confused. Had an episode of choking yesterday and she is now NPO. ST to see her. No further bleeding at surgical site. She got two units of PRBC. She is confused but apparently close to baseline. Her BP is improved. Objective:   Patient Vitals for the past 4 hrs:   BP Temp Temp src Pulse Resp SpO2   06/20/19 0732 (!) 149/80 98.3 °F (36.8 °C) Oral 117 22 96 %   06/20/19 0500 (!) 157/87 98.4 °F (36.9 °C) Oral 93 18 93 %            Intake/Output Summary (Last 24 hours) at 6/20/2019 0804  Last data filed at 6/20/2019 0511  Gross per 24 hour   Intake 4037.66 ml   Output 775 ml   Net 3262.66 ml       Physical Exam:    General appearance: elderly white female. Confused. Head: Normocephalic, without obvious abnormality, atraumatic  Eyes: conjunctivae/corneas clear. PERRL, EOM's intact. Neck: no adenopathy, no carotid bruit, no JVD, supple, symmetrical, trachea midline and thyroid not enlarged, symmetric, no tenderness/mass/nodules  Lungs: clear to auscultation bilaterally  Heart: regular rate and rhythm, S1, S2 normal, no murmur, click, rub or gallop  Abdomen: soft, non-tender; bowel sounds normal; no masses,  no organomegaly  Extremities: right leg is wrapped. No bleeding seen.   Pulses: 2+ and symmetric  Skin: Skin color, texture, turgor normal. No rashes or lesions  Neurologic: Grossly normal    Scheduled Meds:   [Held by provider] amLODIPine  2.5 mg Oral Daily    aspirin EC  81 mg Oral Daily    atorvastatin  20 mg Oral Nightly    calcium elemental  1 tablet Oral Daily    ferrous sulfate  325 mg Oral Daily with breakfast    omega-3 acid ethyl esters  1 g Oral Daily    pantoprazole  40 mg Oral QAM Janeal Files   - LHC in May 2015 with non-obstructive CAD  - Normal stress in May 2018   - no acute ACS symptoms   - EKG wnl   - BP is improved. Resume BP medication.     Constipation   - noted on CT   - Continue Fibercon      Bipolar DO  Depression  I suspect underlying dementia.   - On Symbyax at home  -Therapeutic interchange  Prozac and Zyprexa separately      GERD  - Continue PPI      HLD  - Continue Statin     Await ST evaluation.        DVT Prophylaxis: Lovenox - resume  Diet: Diet NPO Effective Now   Code Status: Full Code             Clayton Bernabe MD 6/20/2019 8:04 AM

## 2019-06-20 NOTE — PROGRESS NOTES
Speech Language Pathology  Facility/Department: [facility]   CLINICAL BEDSIDE SWALLOW EVALUATION    NAME: Matthew Magana  : 1940  MRN: [id number]    ADMISSION DATE: 2019  ADMITTING DIAGNOSIS: has GERD (gastroesophageal reflux disease); Urge urinary incontinence; Bipolar disorder ([facility] [address] Chest pain; Essential hypertension; Hyperlipidemia; Coronary artery disease involving native coronary artery of native heart with angina pectoris ([facility] [address] Bipolar disorder, in full remission, most recent episode depressed ([facility] [address] Cardiac microvascular disease; HASKINS (dyspnea on exertion); Pyelonephritis; Fever; Renal calculi; Abnormal CXR; Epigastric pain; Thyroid nodule; Elevated LFTs; Primary osteoarthritis involving multiple joints; Hypertensive urgency; Closed fracture of right femur ([facility] [address] Acute blood loss anemia; and Hypotension on their problem list.  ONSET DATE: 2019    Recent Chest Xray/CT of Chest: (2019)  Impression   1. Hypoventilatory chest with chronic underlying interstitial lung changes. Patchy opacities within the left mid lung may reflect infiltrate related to   possible aspiration as per given history. Date of Eval: 2019  Evaluating Therapist: Sindi Schaefer    Current Diet level:  Current Diet : NPO  Current Liquid Diet : NPO      Primary Complaint  Patient Complaint: The patient is an 79 y/o female admitted to [facility] s/p mechanical fall resulting in proximal femure fracture. RN reports that during administration of medications last night, the patient presented with choking episode. Rn reports that episode was so severe that intubation was considered. Pt has NPO since incident.      Pain:  Pain Assessment  Pain Assessment: Faces  Pain Level: 7  Alanis-Baker Pain Rating: Hurts even more  Pain Type: Surgical pain  Pain Location: Hip  Pain Orientation: Right  Pain Descriptors: Constant, Discomfort(\"Help me\" she yells out)  Pain Frequency: Continuous  Pain Onset: On-going  Clinical Progression: Gradually worsening  Non-Pharmaceutical Pain Intervention(s): Repositioned  Response to Pain Intervention: Patient Satisfied  POSS Score (Patient Ctrl Analgesia): 1    Reason for Referral  Ana Head was referred for a bedside swallow evaluation to assess the efficiency of her swallow function, identify signs and symptoms of aspiration and make recommendations regarding safe dietary consistencies, effective compensatory strategies, and safe eating environment. Impression  Dysphagia Diagnosis: Mild oral stage dysphagia;Mild pharyngeal stage dysphagia  Dysphagia Outcome Severity Scale: Level 5: Mild dysphagia- Distant supervision. May need one diet consistency restricted       The patient was seen in room at beside with RN permission. The patient was noted to perseverate on \"help me\". Pt was unable to be redirected. The patient is oriented to self only and unable to follow commands or 1-step directions. O2 sat was 96 on 2L of O2 via NC. RR unable to be accurately obtained due to movement and tolerance. MBS completed 7/2/18 recommended thin liquids with use of chin tuck 2/2 penetration, and regular solids. Per MBS, thicker consistencies were worse secondary to pooling and poor pharyngeal constriction. The patient is unable to follow commands for chin tuck at this time. OME, volitional cough, and volitional throat clear were assessed informally due to patient's inability to participate. SLP trialing puree solids and noting some oral defensiveness. Piecemeal swallow noted 3/3. Due to picemeal swallow, oral defensiveness, confusion, and inability to fully assess oral phase, further solid trials were not administered. No clinical s/s of aspiration noted with thin liquids via cup drink with small sips. SLP unable to assess oral clearance of boli immediately, however, following trials SLP attempting to complete oral care and noting no oral residuals.  No change in O2 saturation throughout evaluation. HR maintaining above 100-RN aware. SLP recommends dysphagia 1 puree with thin liquids via small cup sips only. Meds crushed in puree as possible. Frequent oral care is highly recommended secondary to xerostomia and halitosis noted during evaluation. Hold PO and contact SLP if s/s of aspiration develop. Treatment Plan  Requires SLP Intervention: Yes  Duration/Frequency of Treatment: 3-5x/wk  D/C Recommendations: To be determined       Recommended Diet and Intervention  Diet Solids Recommendation: Dysphagia I Pureed  Liquid Consistency Recommendation: Thin  Recommended Form of Meds: Crushed in puree as able  Recommendations: Dysphagia treatment  Therapeutic Interventions: Patient/Family education; Therapeutic PO trials with SLP; Diet tolerance monitoring;Oral care    Compensatory Swallowing Strategies  Compensatory Swallowing Strategies:   - Alternate solids and liquids  - Small bites/sips  - Eat/Feed slowly  - Total feed  - Upright as possible for all oral intake    Treatment/Goals  Short-term Goals  Timeframe for Short-term Goals: 5 days (6/24)  Goal 1: The patient will tolerate least restrictive diet with no signs or symptoms of aspiration 10/10  Goal 2: The patient/caregivers will demonstrate understanding of compensatory strategies, no cues  Goal 3: The patient will tolerate mechanical soft solids with no signs or symptoms of aspiration 10/10  Long-term Goals  Timeframe for Long-term Goals: 7 days (6/26)  Goal 1: The patient will tolerate least restrictive diet with no signs or symptoms of aspiration to enhance nutrition/hydration and airway protection    General  Chart Reviewed: Yes  Comments: Chart reviewed for completion of BSE  Subjective  Subjective: The patient is seen at bedside with RN permission. The patiet alert and oriented to self only. Perseverating on \"help me\". Behavior/Cognition: Alert; Doesn't follow directions;Confused; Agitated  Respiratory Status: O2 via nasual cannula  O2 Device: Nasal cannula  Liters of Oxygen: 2 L  Communication Observation: Functional  Follows Directions: None  Dentition: Adequate  Patient Positioning: Upright in bed  Baseline Vocal Quality: Normal  Volitional Cough: (Unable to elicit)  Volitional Swallow: Delayed  Prior Dysphagia History: The patient as seen by speech therapy upon prior admission (July of 2018). MBSS was completed with recommendaitons for chin tuck with thin liquids and regular solids. Thicker consistencies were less safe secodnary to poor pharyngeal constriction and pooling. Consistencies Administered: Puree; Thin - cup         Vision/Hearing  Vision  Vision: Within Functional Limits  Hearing  Hearing: Within functional limits    Oral Motor Deficits  Oral/Motor  Oral Motor: (Unable to assess)    Oral Phase Dysfunction  Oral Phase  Oral Phase: Exceptions  Oral Phase Dysfunction  Decreased Anterior to Posterior Transit: Puree     Indicators of Pharyngeal Phase Dysfunction   Pharyngeal Phase  Pharyngeal Phase: Exceptions  Indicators of Pharyngeal Phase Dysfunction  Decreased Laryngeal Elevation: All    Prognosis  Prognosis  Prognosis for safe diet advancement: guarded  Barriers to reach goals: cognitive deficits;age  Individuals consulted  Consulted and agree with results and recommendations: Patient;RN    Education  Patient Education: SLP re: Scope of practice and reason for referral, rationale of evaluation, anatomical compnents of swallow structures to enhance understanding of recommendations, results of evaluation, and recommendations including diet modifications and compensatory strategies  Patient Education Response: No evidence of learning  Safety Devices in place: Yes  Type of devices: All fall risk precautions in place; Left in bed;Nurse notified; Bed alarm in place         Therapy Time  SLP Individual Minutes  Time In: 0845  Time Out: 0915  Minutes: [address] [facility] [id number] [id number]  Speech-Language Pathologist    Alicia Pratt, SLP  6/20/2019 9:31 AM

## 2019-06-20 NOTE — PROGRESS NOTES
Department of Orthopedic Surgery  Nurse Practitioner   Progress Note    Subjective:     Post-Operative Day: 2 Status Post right hip IM nail  Systemic or Specific Complaints: Patient seen getting up to the chair with therapy. Pt continuously saying \"help,\" will not answer any questions. No family at bedside. Therapy, RN and PCA at bedside. Objective:     Patient Vitals for the past 24 hrs:   BP Temp Temp src Pulse Resp SpO2   06/20/19 0817 (!) 166/94 97.8 °F (36.6 °C) Axillary 101 22 93 %   06/20/19 0732 (!) 149/80 98.3 °F (36.8 °C) Oral 117 22 96 %   06/20/19 0500 (!) 157/87 98.4 °F (36.9 °C) Oral 93 18 93 %   06/20/19 0039 139/72 97.7 °F (36.5 °C) Oral 94 18 95 %   06/19/19 2155 (!) 177/81 -- -- 92 18 92 %   06/19/19 1959 120/64 98.6 °F (37 °C) Oral 94 18 97 %   06/19/19 1855 -- -- -- -- 16 96 %   06/19/19 1820 133/75 99.1 °F (37.3 °C) Oral 97 18 93 %   06/19/19 1719 127/69 99 °F (37.2 °C) Oral 92 18 95 %   06/19/19 1635 133/67 98.7 °F (37.1 °C) Oral 97 18 98 %   06/19/19 1545 105/65 97.6 °F (36.4 °C) Oral 87 18 91 %   06/19/19 1514 (!) 88/55 97.7 °F (36.5 °C) Oral 79 18 --   06/19/19 1457 113/63 97.3 °F (36.3 °C) Oral 89 18 95 %   06/19/19 1430 126/69 98.7 °F (37.1 °C) Oral 95 18 96 %   06/19/19 1330 125/64 97.2 °F (36.2 °C) Oral 87 14 95 %   06/19/19 1300 131/70 -- -- 91 -- --   06/19/19 1214 (!) 89/56 97.3 °F (36.3 °C) Oral 77 14 97 %   06/19/19 1104 91/61 98.4 °F (36.9 °C) Axillary 81 16 98 %   06/19/19 1038 96/61 96.8 °F (36 °C) Oral 81 16 98 %       General: alert, appears stated age, cooperative and no distress   Wound:  Shadow drainage noted to most proximal aspect of dressing. Otherwise CDI. ACE wrap. Motion: Painful range of Motion   DVT Exam: No evidence of DVT seen on physical exam.       NVI in lower extremity. Thigh swollen but soft. Moving foot and ankle.     Data Review  CBC:   Lab Results   Component Value Date    WBC 7.5 06/20/2019    RBC 2.58 06/20/2019    HGB 8.1 06/20/2019    HCT 23.7 06/20/2019     06/20/2019       Assessment:     Status Post right hip IM nail. Doing well postoperatively. Plan:      1: Continues current post-op course : Continue current plan of care per medicine. Postop labs reviewed. Acute blood loss anemia, expected after surgery. Pt received 2 units PRBC yesterday. H&H improved overnight. Dressing stayed CDI overnight and into morning until moving around with therapy. Change daily and reinforce PRN, continue ACE wrap. Pt has Vides, remove. Pt not on pureed diet with thin liquids - changed pain med to liquid shiv. 2:  Continue Deep venous thrombosis prophylaxis - Lovenox  3:  Continue physical therapy, OT, TTWB  4:  Continue Pain Control  5:  Patient will need SNF at discharge. CM aware and following. Discharge pending placement, voiding trial and medical clearance. Okay to discharge from ortho standpoint. Discharge instructions completed and scripts in script folder. Follow up with Dr. John Salcido in 2 weeks. Keegan Matias , FCO    Edited to add: Pt to go to [facility] at discharge per CM. Pt will need 3 night stay and can discharge tomorrow.

## 2019-06-20 NOTE — PROGRESS NOTES
Inpatient Physical Therapy Evaluation and Treatment    Unit: [facility]  Date:  6/20/2019  Patient Name:    Shayne Alejandro  Admitting diagnosis:  Closed fracture of right femur ([address] Ibeth Browning Date:  6/18/2019  Precautions/Restrictions/WB Status/ Lines/ Wounds/ Oxygen: fall risk, IV, bed/chair alarm, cheng catheter , supplemental o2 (2L), confusion and telesitter    Treatment Time:  8049-0635  Treatment Number:  1   Timed Code Treatment Minutes: 17 minutes  Total Treatment Minutes:  27  minutes    Patient Goals for Therapy: \" to go home \"          Discharge Recommendations: SNF  DME needs for discharge: defer to facility       Therapy recommendations for staff:   Assist of 2 with use of STEDY for all transfers to/from chair. TTWB RLE. Use maxi-lift if pt is unable to stand. (pad in place)    Home Health S4 Level Recommendation:  NA  AM-PAC Mobility Score    AM-PAC Inpatient Mobility Raw Score : 9       Preadmission Environment    Pt. Lives With Family and 24/7 Assist Available   Home environment:  two story home  Steps to enter first floor: 4 Steps to enter and One Hand rail  Steps to second floor: N/A  Bathroom: Bath Tub Shower and Raised toilet seat with grab bars  Equipment owned: [facility], [address] [place] [address], Shower Chair, lift chair and hospital bed    Preadmission Status: would like to confirm information with family when available   Pt. Able to drive: No  Pt Fully independent with ADLs: Unknown  Pt. Required assistance from family for: Cleaning, Cooking and Laundry   Pt. Fully independent for transfers and gait and walked with Unknown  History of falls Yes    Pain   Yes  Location: RLE  Rating: moderate /10 (based on faces scale)   Pain Medicine Status: RN notified    Cognition    A&O x0 (did not answer any questions)   Able to follow 1 step commands inconsistently    Subjective  Patient lying supine in bed with no family present  Pt agreeable to this PT eval & tx.    Pt continuously repeating \"Help me,\" verbalizations otherwise limited primarily to \"yes\" and \"no\"    Upper Extremity ROM/Strength  Please see OT evaluation. Lower Extremity ROM / Strength    AROM WFL: Yes  ROM limitations:     BLE strength impaired, but not formally assessed with MMT. Lower Extremity Sensation    NT    Lower Extremity Proprioception:   NT    Coordination and Tone  WFL    Balance  Static Sitting:  Poor   Tolerance: ~5 min with max A  Dynamic Sitting:  Poor  Static Standing: Poor   Tolerance: <10 sec, x2  Dynamic Standing: Not tested    Bed Mobility   Supine to Sit:   Max A of 2  Sit to Supine:  Not Tested  Rolling:   Not Tested  Scooting at EOB: Max A of 2  Scooting to [facility]:  Not Tested    Transfer Training     Sit to stand: Max A of 2  Stand to sit: Max A of 2  Bed to Chair:  Total A via Stedy with use of N/A    Gait gait deferred due to difficulty with transfers; pt ambulated 0 ft. Activity Tolerance   Pt completed therapy session with Dizziness, Pain and SOB noted w/activity  SpO2: 95% post transfer   HR:  BP: 153/90 sitting EOB     Positioning Needs   Pt reclined in chair, call light and needs in reach, alarm set, pillows placed for support/comfort and RN in room     Exercises Initiated    N/A    Other  None. Patient/Family Education   Pt educated on role of inpatient PT, POC, importance of continued activity, safety awareness and transfer techniques. Assessment  Pt seen for Physical Therapy evaluation in acute care setting. Pt demonstrated decreased Activity tolerance, Balance, Safety and Strength and decreased independence with Ambulation, Bed Mobility  and Transfers. Recommending pt DC to SNF when medically appropriate to safely progress IND with functional mobility and to reduce risk for additional falls. Goals : To be met in 3 visits:  1). Tolerate LE Ex x 10 reps    To be met in 6 visits:  1). Supine to/from sit: Mod A of 2  2). Sit to/from stand: Mod A of 2  3).   Bed to chair: 5x/wk with LRAD 4).  Tolerate B LE exercises 3 sets of 10-15 reps    Rehabilitation Potential: Fair  Strengths for achieving goals include:   PLOF, Family Support and Pt cooperative  Barriers to achieving goals include:    Pain , confusion     Plan    To be seen 5x / week  while in acute care setting for therapeutic exercises, bed mobility, transfers, progressive gait training, balance training, and family/patient education. Nathaniel Iraheta, PT, DPT #[id number]     If patient discharges from this facility prior to next visit, this note will serve as the Discharge Summary.

## 2019-06-20 NOTE — PROGRESS NOTES
Pt no void since cheng removed at 1230. 6 hours post cheng removed and bladder scan is 417. Perfect serve sent to Dr. Senthil Zhang. New orders to straight cath pt and obtain a UA with repeat back. Family, Juan/POA was concerned for possible UTI from symptoms at home is the reason for the UA at this time.

## 2019-06-20 NOTE — PROGRESS NOTES
Pt a/o to self only. Pt ate 25% puree diet lunch with assistance eating. Pt tolerated well with no choking noted sitting up in 90 degree angle in chair. Pt transferred from chair to bed with assist x2 and steady. Pt was pulling self up and tolerated transfer fairly well. FACEs scale when back to bed was 8/10 and pt yelling out \"Help me\". PRN morphine 2 mg IV given. Vides cath removed at this time and attends applied. Dressing reinforced and changed to right hip due to moderate drainage, ace wrap remains from groin to toes on right side. Call light within reach. Bed alarm is on. Tele-sitter is on. Will monitor.     06/20/19 1218   Vital Signs   Temp 99.4 °F (37.4 °C)   Temp Source Axillary   BP (!) 176/87   BP Location Left lower arm   BP Upper/Lower Lower   Patient Currently in Pain Yes   Pain Assessment   Pain Assessment Faces   Pain Location Hip   Pain Orientation Right   Pain Type Surgical pain   Pain Onset On-going   Pain Frequency Continuous   Alanis-Baker Pain Rating 8   Clinical Progression Gradually worsening

## 2019-06-20 NOTE — PROGRESS NOTES
Pt a/o to self only. FACEs scale of 6/10 into right hip where she is grabbing. PRN oral liquid oxycodone 5 mg given at this time. Pt repositioned with pillow support. Call light within reach. Bed alarm is on. Tele-sitter is on. Will monitor.       06/20/19 9406   Pain Assessment   Pain Assessment Faces   Pain Location Hip   Pain Orientation Right   Pain Type Surgical pain   Pain Descriptors Discomfort   Pain Onset On-going   Pain Frequency Continuous   Alanis-Baker Pain Rating 6   Clinical Progression Gradually worsening

## 2019-06-20 NOTE — PROGRESS NOTES
Pt resting in bed with eyes closed. R e/e. 0 s/s distress/ discomfort. Call light within reach. Will continue to monitor.

## 2019-06-20 NOTE — PROGRESS NOTES
Pt laying in bed saying \"help me, help me. \" PRN morphine given using faces pain scale. Bed alarm in place. Will monitor.

## 2019-06-20 NOTE — PROGRESS NOTES
Shift assessment complete. See flow sheet. Vital signs stable. Pt started chocking after taking one pill. Pt quickly recovered and O2 sats at 96. Dr. Grant Heading in to see pt and new orders given. Pt now NPO for swallow eval.  Patient is alert to self. Rt hip dressing is CDI. No s/s distress. Call light  in reach. Will continue to monitor.

## 2019-06-20 NOTE — PROGRESS NOTES
Inpatient Occupational Therapy  Evaluation and Treatment    Unit: 2 [place]  Date:  6/20/2019  Patient Name:    Arya Lozano  Admitting diagnosis:  Closed fracture of right femur [facility]) Maryann Figures Date:  6/18/2019  Precautions/Restrictions/WB Status/ Lines/ Wounds/ Oxygen: fall risk, IV, bed/chair alarm, cheng catheter , supplemental o2 (2L) and confusion, R LE TTWB    Treatment Time:  9:17-9:45  Treatment Number: 1   Billable Treatment Time: 15 minutes   Total Treatment Time:   28 minutes    Patient Goals for Therapy:  Unable to state. Patient stating \"help me\" repetitively throughout evaluation. Discharge Recommendations: SNF  DME needs for discharge: defer to facility       Therapy recommendations for staff:   Assist of 2 with use of STEDY for all transfers to/from chair      Home Health S4 Level Recommendation:  NA  AM-PAC Score: 9    Preadmission Environment  (Patient unable to provide PLOF and environment. Received information from previous evaluation 1 year ago. Will confirm with family when present.)    Pt. Lives with her son. Brandie Rhodes here at all times. Home environment: two story home; pt's bedroom is on first floor. Jeremias Cortez passed away in September of 2017. Steps to enter first floor: 3-4 with HR; states her son usually helps her up and down  Steps to second floor:  Does not use. Bath: tub/shower, raised height toilet seat with handles   Equipment owned: hospital bed, single point cane, shower seat, lift chair x2, walker (belonged to ).     Preadmission Status:  Pt. Not able to drive, states \"I can't hardly get in\" the car - pt's son drives her  Pt. Had assistance from son for cooking, cleaning, laundry  Pt independent with dressing  Pt has HHA 2x/wk to assist with bathing - sits on shower chair (aide does hair and LB, pt completes UB)   Pt. Fully independent for transfers and gait and walked holding onto furniture initially, then walks without AD in house.   Preferred Leisure Ax's:

## 2019-06-20 NOTE — PROGRESS NOTES
Per speech eval new order for puree diet with thin liquids. No barium at this time, possibly tomorrow 6/21.

## 2019-06-21 VITALS
WEIGHT: 159.8 LBS | HEIGHT: 62 IN | DIASTOLIC BLOOD PRESSURE: 93 MMHG | TEMPERATURE: 99.9 F | RESPIRATION RATE: 20 BRPM | SYSTOLIC BLOOD PRESSURE: 179 MMHG | HEART RATE: 86 BPM | OXYGEN SATURATION: 95 % | BODY MASS INDEX: 29.4 KG/M2

## 2019-06-21 LAB
ABO/RH: NORMAL
ANION GAP SERPL CALCULATED.3IONS-SCNC: 10 MMOL/L (ref 3–16)
ANTIBODY IDENTIFICATION: NORMAL
ANTIBODY IDENTIFICATION: NORMAL
ANTIBODY SCREEN: NORMAL
BASOPHILS ABSOLUTE: 0 K/UL (ref 0–0.2)
BASOPHILS RELATIVE PERCENT: 0.1 %
BLOOD BANK DISPENSE STATUS: NORMAL
BLOOD BANK PRODUCT CODE: NORMAL
BPU ID: NORMAL
BUN BLDV-MCNC: 29 MG/DL (ref 7–20)
CALCIUM SERPL-MCNC: 8.1 MG/DL (ref 8.3–10.6)
CHLORIDE BLD-SCNC: 109 MMOL/L (ref 99–110)
CO2: 19 MMOL/L (ref 21–32)
CREAT SERPL-MCNC: 0.6 MG/DL (ref 0.6–1.2)
DAT IGG CAPTURE: NORMAL
DESCRIPTION BLOOD BANK: NORMAL
EOSINOPHILS ABSOLUTE: 0 K/UL (ref 0–0.6)
EOSINOPHILS RELATIVE PERCENT: 0 %
GFR AFRICAN AMERICAN: >60
GFR NON-AFRICAN AMERICAN: >60
GLUCOSE BLD-MCNC: 130 MG/DL (ref 70–99)
HCT VFR BLD CALC: 19.9 % (ref 36–48)
HEMOGLOBIN: 6.7 G/DL (ref 12–16)
LYMPHOCYTES ABSOLUTE: 0.6 K/UL (ref 1–5.1)
LYMPHOCYTES RELATIVE PERCENT: 11.5 %
MCH RBC QN AUTO: 31 PG (ref 26–34)
MCHC RBC AUTO-ENTMCNC: 33.8 G/DL (ref 31–36)
MCV RBC AUTO: 91.7 FL (ref 80–100)
MONOCYTES ABSOLUTE: 0.6 K/UL (ref 0–1.3)
MONOCYTES RELATIVE PERCENT: 11.1 %
NEUTROPHILS ABSOLUTE: 4.1 K/UL (ref 1.7–7.7)
NEUTROPHILS RELATIVE PERCENT: 77.3 %
PDW BLD-RTO: 15 % (ref 12.4–15.4)
PLATELET # BLD: 118 K/UL (ref 135–450)
PMV BLD AUTO: 7.2 FL (ref 5–10.5)
POTASSIUM REFLEX MAGNESIUM: 3.7 MMOL/L (ref 3.5–5.1)
RBC # BLD: 2.17 M/UL (ref 4–5.2)
SODIUM BLD-SCNC: 138 MMOL/L (ref 136–145)
WBC # BLD: 5.3 K/UL (ref 4–11)

## 2019-06-21 PROCEDURE — 6370000000 HC RX 637 (ALT 250 FOR IP): Performed by: NURSE PRACTITIONER

## 2019-06-21 PROCEDURE — 86900 BLOOD TYPING SEROLOGIC ABO: CPT

## 2019-06-21 PROCEDURE — 86922 COMPATIBILITY TEST ANTIGLOB: CPT

## 2019-06-21 PROCEDURE — 86870 RBC ANTIBODY IDENTIFICATION: CPT

## 2019-06-21 PROCEDURE — 92526 ORAL FUNCTION THERAPY: CPT

## 2019-06-21 PROCEDURE — 6360000002 HC RX W HCPCS: Performed by: ORTHOPAEDIC SURGERY

## 2019-06-21 PROCEDURE — 6370000000 HC RX 637 (ALT 250 FOR IP)

## 2019-06-21 PROCEDURE — 36415 COLL VENOUS BLD VENIPUNCTURE: CPT

## 2019-06-21 PROCEDURE — 99239 HOSP IP/OBS DSCHRG MGMT >30: CPT | Performed by: INTERNAL MEDICINE

## 2019-06-21 PROCEDURE — 80048 BASIC METABOLIC PNL TOTAL CA: CPT

## 2019-06-21 PROCEDURE — 86902 BLOOD TYPE ANTIGEN DONOR EA: CPT

## 2019-06-21 PROCEDURE — 2580000003 HC RX 258: Performed by: INTERNAL MEDICINE

## 2019-06-21 PROCEDURE — 6370000000 HC RX 637 (ALT 250 FOR IP): Performed by: INTERNAL MEDICINE

## 2019-06-21 PROCEDURE — P9016 RBC LEUKOCYTES REDUCED: HCPCS

## 2019-06-21 PROCEDURE — 86880 COOMBS TEST DIRECT: CPT

## 2019-06-21 PROCEDURE — 85025 COMPLETE CBC W/AUTO DIFF WBC: CPT

## 2019-06-21 PROCEDURE — 36430 TRANSFUSION BLD/BLD COMPNT: CPT

## 2019-06-21 PROCEDURE — 2580000003 HC RX 258: Performed by: ORTHOPAEDIC SURGERY

## 2019-06-21 PROCEDURE — 86901 BLOOD TYPING SEROLOGIC RH(D): CPT

## 2019-06-21 PROCEDURE — 6370000000 HC RX 637 (ALT 250 FOR IP): Performed by: ORTHOPAEDIC SURGERY

## 2019-06-21 PROCEDURE — 6360000002 HC RX W HCPCS: Performed by: NURSE PRACTITIONER

## 2019-06-21 PROCEDURE — 86850 RBC ANTIBODY SCREEN: CPT

## 2019-06-21 RX ORDER — 0.9 % SODIUM CHLORIDE 0.9 %
250 INTRAVENOUS SOLUTION INTRAVENOUS ONCE
Status: DISCONTINUED | OUTPATIENT
Start: 2019-06-21 | End: 2019-06-21 | Stop reason: HOSPADM

## 2019-06-21 RX ORDER — DIMETHICONE, OXYBENZONE, AND PADIMATE O 2; 2.5; 6.6 G/100G; G/100G; G/100G
STICK TOPICAL
Status: COMPLETED
Start: 2019-06-21 | End: 2019-06-21

## 2019-06-21 RX ADMIN — OXYCODONE HYDROCHLORIDE 5 MG: 5 SOLUTION ORAL at 12:20

## 2019-06-21 RX ADMIN — SODIUM CHLORIDE 250 ML: 9 INJECTION, SOLUTION INTRAVENOUS at 12:30

## 2019-06-21 RX ADMIN — ATORVASTATIN CALCIUM 20 MG: 10 TABLET, FILM COATED ORAL at 00:08

## 2019-06-21 RX ADMIN — POLYETHYLENE GLYCOL (3350) 17 G: 17 POWDER, FOR SOLUTION ORAL at 12:22

## 2019-06-21 RX ADMIN — CALCIUM POLYCARBOPHIL 625 MG: 625 TABLET, FILM COATED ORAL at 12:41

## 2019-06-21 RX ADMIN — FLUOXETINE 20 MG: 20 CAPSULE ORAL at 00:11

## 2019-06-21 RX ADMIN — SENNOSIDES AND DOCUSATE SODIUM 2 TABLET: 8.6; 5 TABLET ORAL at 12:21

## 2019-06-21 RX ADMIN — MELATONIN TAB 5 MG 5 MG: 5 TAB at 00:16

## 2019-06-21 RX ADMIN — Medication: at 06:08

## 2019-06-21 RX ADMIN — OLANZAPINE 5 MG: 5 TABLET, FILM COATED ORAL at 00:08

## 2019-06-21 RX ADMIN — Medication 500 MG: at 12:21

## 2019-06-21 RX ADMIN — AMLODIPINE BESYLATE 2.5 MG: 2.5 TABLET ORAL at 12:21

## 2019-06-21 RX ADMIN — ASPIRIN 81 MG: 81 TABLET, COATED ORAL at 12:21

## 2019-06-21 RX ADMIN — FERROUS SULFATE TAB 325 MG (65 MG ELEMENTAL FE) 325 MG: 325 (65 FE) TAB at 12:21

## 2019-06-21 RX ADMIN — OXYCODONE HYDROCHLORIDE 5 MG: 5 SOLUTION ORAL at 05:58

## 2019-06-21 RX ADMIN — SODIUM CHLORIDE: 9 INJECTION, SOLUTION INTRAVENOUS at 08:36

## 2019-06-21 RX ADMIN — PANTOPRAZOLE SODIUM 40 MG: 40 TABLET, DELAYED RELEASE ORAL at 05:50

## 2019-06-21 RX ADMIN — SODIUM CHLORIDE: 9 INJECTION, SOLUTION INTRAVENOUS at 00:09

## 2019-06-21 RX ADMIN — OMEGA-3-ACID ETHYL ESTERS 1 G: 900 CAPSULE, LIQUID FILLED ORAL at 12:40

## 2019-06-21 RX ADMIN — LORAZEPAM 0.25 MG: 0.5 TABLET ORAL at 08:37

## 2019-06-21 RX ADMIN — ENOXAPARIN SODIUM 40 MG: 100 INJECTION SUBCUTANEOUS at 12:22

## 2019-06-21 RX ADMIN — MORPHINE SULFATE 2 MG: 4 INJECTION INTRAVENOUS at 00:08

## 2019-06-21 RX ADMIN — METOPROLOL SUCCINATE 50 MG: 50 TABLET, EXTENDED RELEASE ORAL at 12:22

## 2019-06-21 ASSESSMENT — PAIN SCALES - GENERAL
PAINLEVEL_OUTOF10: 7
PAINLEVEL_OUTOF10: 6
PAINLEVEL_OUTOF10: 6

## 2019-06-21 NOTE — DISCHARGE SUMMARY
Son reports that the patient has limited mobility at baseline.        Physical Exam at Discharge:  BP (!) 169/74   Pulse 100   Temp 100.3 °F (37.9 °C) (Axillary)   Resp 20   Ht 5' 2\" (1.575 m)   Wt 159 lb 12.8 oz (72.5 kg)   SpO2 95%   BMI 29.23 kg/m²         General appearance: elderly white female. Confused. Head: Normocephalic, without obvious abnormality, atraumatic  Eyes: conjunctivae/corneas clear. PERRL, EOM's intact. Neck: no adenopathy, no carotid bruit, no JVD, supple, symmetrical, trachea midline and thyroid not enlarged, symmetric, no tenderness/mass/nodules  Lungs: clear to auscultation bilaterally  Heart: regular rate and rhythm, S1, S2 normal, no murmur, click, rub or gallop  Abdomen: soft, non-tender; bowel sounds normal; no masses,  no organomegaly  Extremities: right leg is wrapped. No bleeding seen. Pulses: 2+ and symmetric  Skin: Skin color, texture, turgor normal. No rashes or lesions  Neurologic: Grossly normal        Hospital Course    R displaced subtrochanteric femur fracture   - IM nailing done on 6/18/19.  - Post op day # 3.  - PT and OT saw her and recommended SNF     Acute blood loss anemia  - due to fracture. - she has had some bleeding from surgery site that is now resolved. - got 4 units of PRBC.     HTN  Non-obstructive CAD   - NSTEMI May 2015--follows with Dr. Thi King   - Almaz Esparza in May 2015 with non-obstructive CAD  - Normal stress in May 2018   - no acute ACS symptoms   - EKG wnl   - BP is improved. Resumed BP medication.     Constipation   - noted on CT   - Continue Fibercon      Bipolar DO  Depression  I suspect underlying dementia. - On Symbyax at home  -Therapeutic interchange  Prozac and Zyprexa separately      GERD  - Continue PPI      HLD  - Continue Statin        Had an episode of choking and ST saw her and recommended a pureed diet.       CBC:   Recent Labs     06/19/19  0517 06/20/19  0608 06/21/19  0540   WBC 9.4 7.5 5.3   HGB 7.3* 8.1* 6.7*   HCT 21.4* 23.7* 19.9*   MCV 96.1 92.1 91.7    124* 118*     BMP:   Recent Labs     06/19/19  0517 06/20/19  0608 06/21/19  0540    138 138   K 4.7 3.8 3.7    106 109   CO2 19* 18* 19*   BUN 40* 43* 29*   CREATININE 1.3* 1.1 0.6     LIVER PROFILE:   Recent Labs     06/18/19  1247   AST 33   ALT 36   LIPASE 32.0   BILIDIR <0.2   BILITOT 0.4   ALKPHOS 77     PT/INR:   Recent Labs     06/18/19  1247   PROTIME 11.4   INR 1.00     APTT:   Recent Labs     06/18/19  1247   APTT 28.2     UA:  Recent Labs     06/20/19  1854   COLORU Yellow   PHUR 5.5   LABCAST 3-5 Coarse Gran*   WBCUA 0-2   RBCUA *   BACTERIA 2+*   CLARITYU SL CLOUDY*   SPECGRAV 1.025   LEUKOCYTESUR Negative   UROBILINOGEN 0.2   BILIRUBINUR Negative   BLOODU LARGE*   GLUCOSEU Negative   AMORPHOUS 2+*       XR CHEST PORTABLE   Final Result   1. Hypoventilatory chest with chronic underlying interstitial lung changes. Patchy opacities within the left mid lung may reflect infiltrate related to   possible aspiration as per given history. FLUORO FOR SURGICAL PROCEDURES   Final Result   Intraprocedural fluoroscopic spot images as above. See separate procedure   report for more information. CT Head WO Contrast   Final Result   No acute intracranial abnormality. CT ABDOMEN PELVIS W IV CONTRAST Additional Contrast? None   Final Result   Acute traumatic displaced right proximal femur fracture that involves the   lesser trochanter. No acute intra-abdominal or intrapelvic abnormality. Biliary ductal dilatation status post cholecystectomy, slightly increased   compared to prior from 2017. Moderate to large stool burden consistent with constipation. XR FEMUR RIGHT (MIN 2 VIEWS)   Final Result   Acute traumatic mildly displaced right subtrochanteric femur fracture. XR HIP RIGHT (2-3 VIEWS)   Final Result   Acute traumatic mildly displaced right subtrochanteric femur fracture.               Discharge Medications     Medication List      START taking these medications    enoxaparin 40 MG/0.4ML injection  Commonly known as:  LOVENOX  Inject 0.4 mLs into the skin daily     oxyCODONE 5 MG/5ML solution  Commonly known as:  ROXICODONE  Take 5 mLs by mouth every 4 hours as needed for Pain for up to 3 days. S/P HIP IM NAIL        CHANGE how you take these medications    OLANZapine-FLUoxetine 6-25 MG Caps capsule  Commonly known as:  SYMBYAX  TAKE 1 CAPSULE EVERY       EVENING  What changed:    · how much to take  · additional instructions     polyethylene glycol powder  Commonly known as:  GLYCOLAX  POUR 17GM OF POWDER INTO   MEASURING CAP STIR INTO 8OZOF WATER AND DRINK DAILY  What changed:    · when to take this  · reasons to take this  · additional instructions        CONTINUE taking these medications    amLODIPine 2.5 MG tablet  Commonly known as:  NORVASC  Take 1 tablet by mouth daily     aspirin EC 81 MG EC tablet  Take 1 tablet by mouth daily With food     calcium carbonate 600 MG Tabs tablet  Take 1 tablet by mouth daily     CITRUCEL 500 MG Tabs  Generic drug:  methylcellulose     cyclobenzaprine 10 MG tablet  Commonly known as:  FLEXERIL     diclofenac sodium 1 % Gel  Commonly known as:  VOLTAREN  Apply 2 g topically 4 times daily     ferrous sulfate 325 (65 Fe) MG tablet     fish oil-omega-3 fatty acids 1000 MG capsule     lansoprazole 30 MG delayed release capsule  Commonly known as:  PREVACID  TAKE 1 CAPSULE DAILY.      LIPITOR 20 MG tablet  Generic drug:  atorvastatin     LORazepam 0.5 MG tablet  Commonly known as:  ATIVAN     Melatonin 10 MG Tabs     meloxicam 15 MG tablet  Commonly known as:  MOBIC     metoprolol succinate 50 MG extended release tablet  Commonly known as:  TOPROL XL  Take 1 tablet by mouth daily     Mirabegron ER 25 MG Tb24  Commonly known as:  MYRBETRIQ  TAKE 1 TABLET DAILY     nitroGLYCERIN 0.4 MG SL tablet  Commonly known as:  NITROSTAT  Place 1 tablet under the tongue every 5 minutes

## 2019-06-21 NOTE — CARE COORDINATION
[address] BPCI-A Interview     2019    Patient: Lidia Glez Patient : 1940   MRN: [id number]  Reason for Admission: s/p IM nailing L hip  RARS: Readmission Risk Score: 16    Patient sleeping with telesitter presence. Discussed with discharge planner. Aware patient included in BPCI-A bundle for Hip & Femur Procedure. Patient plan in agreement with [facility] recommendation of SNF. BPCI-A Notification Letter placed in patient folder with discharge paperwork. Information below from prior episode when patient was active in care transition. Readmission Risk  Patient Active Problem List   Diagnosis    GERD (gastroesophageal reflux disease)    Urge urinary incontinence    Bipolar disorder ([address]    Chest pain    Essential hypertension    Hyperlipidemia    Coronary artery disease involving native coronary artery of native heart with angina pectoris [facility]    Bipolar disorder, in full remission, most recent episode depressed ([facility]    Cardiac microvascular disease    HASKINS (dyspnea on exertion)    Pyelonephritis    Fever    Renal calculi    Abnormal CXR    Epigastric pain    Thyroid nodule    Elevated LFTs    Primary osteoarthritis involving multiple joints    Hypertensive urgency    Closed fracture of right femur (HCC)    Acute blood loss anemia    Hypotension       Inpatient Assessment  Care Transitions Summary    Care Transitions Inpatient Review  Medication Review  Do you have all of your prescriptions and are they filled?:  Yes (Comment: states she is able to fill her own med boxes but cannot review meds with me over phone . slow to repsond in conversation )   Barriers to Medication Adherence:  None  Are you able to afford your medications?:  Yes  How often do you have difficulty taking your medications?:  I always take them as prescribed.   Housing Review  Who do you live with?:  Child  Are you an active caregiver in your home?:  No  Does the person that you care for see a [facility] PCP?:  Yes  Social Support  Durable Medical Equipment  Patient DME:  Brenda Soles cane, Chair lift  Functional Review  Ability to seek help/take action for Emergent/Urgent situations i.e. fire, crime, inclement weather or health crisis.:  Needs Assistance  Ability handle personal hygiene needs (bathing/dressing/grooming):  Needs Assistance  Ability to manage medications:  Needs Assistance  Ability to prepare food:  Needs Assistance  Ability to maintain home (clean home, laundry):  Dependent  Ability to drive and/or has transportation:  Dependent  Ability to do shopping:  Dependent  Ability to manage finances:  Needs Assistance  Is patient able to live independently?:  No  Hearing and Vision  Visual Impairment:  Visual impairment (Glasses/contacts)  Hearing Impairment:  None  Care Transitions Interventions     Other Services:  Declined          Follow Up  No future appointments. Health Maintenance  There are no preventive care reminders to display for this patient.     Sol Silveira RN

## 2019-06-21 NOTE — PROGRESS NOTES
Speech Language Pathology  Facility/Department: [facility] MEDICAL-SURGICAL  Dysphagia Daily Treatment Note & Discharge Summary    NAME: Jonna Ngo  : 1940  MRN: [id number]    Patient Diagnosis(es):   Patient Active Problem List    Diagnosis Date Noted    Acute blood loss anemia     Hypotension     Closed fracture of right femur ([facility] 2019    Hypertensive urgency 2018    Primary osteoarthritis involving multiple joints 2018    Elevated LFTs     Epigastric pain 2018    Thyroid nodule 2018    Abnormal CXR 2017    Fever 2017    Renal calculi     Pyelonephritis 2017    HASKINS (dyspnea on exertion) 2017    Cardiac microvascular disease 2016    Bipolar disorder, in full remission, most recent episode depressed ([facility] 2016    Hyperlipidemia 2015    Coronary artery disease involving native coronary artery of native heart with angina pectoris ([facility] 2015    Essential hypertension 2015    Chest pain 2015    GERD (gastroesophageal reflux disease) 2015    Urge urinary incontinence 2015    Bipolar disorder ([facility] 2015     Allergies: Allergies   Allergen Reactions    Sulfamethoxazole Anaphylaxis    Trimethoprim Anaphylaxis    Wellbutrin [Bupropion Hcl] Anaphylaxis    Advil [Ibuprofen Micronized]     Bactrim Swelling    Erythromycin     Guaifenesin Er      Onset Date: 19  Current Diet Level:  Dysphagia 1 puree/thin liquids    Pain:  Pain Assessment  Pain Assessment: no complaint    Diet Tolerance:  Patient tolerating current diet level with no overt signs/symptoms of penetration/aspiration. P.O. Trials: Thin   x Via straw   Nectar       Honey       Puree       Solid         Impressions:  Received approval from RN prior to entry. Received pt resting in bed, alert, but confused; on 1.5 L O2 NC.  Pt perseverating on phrase \"help me\"; unable to clarify and unable to verbal identify presence/absence of pain. Repositioned her to upright position and analyzed PO tolerance. Pt accepted thin liquids via straw, but refused puree items; per RN, pt has been refusing food all day. Pt demonstrated tolerance of thin liquids, with no overt s/s aspiration. Recommend continue current diet, monitoring for signs of aspiration/difficulty swallowing. If s/s arise recommend withhold PO intake and contact speech. Otherwise, as pt is set to discharge from this facility this date, will discharge pt from speech caseload. Patient/Family/Caregiver Education:  Educated pt re: role of SLP, purpose of visit, recommendations. Compensatory Strategies:  - Alternate solids and liquids  - Small bites/sips  - Eat/Feed slowly  - Total feed  - Upright as possible for all oral intake    Plan:  Discharge from speech therapy caseload, as pt set to d/c home this date. Recommend continue dysphagia 1 puree solids and thin liquids as tolerated. If signs/symptoms aspiration arise, recommend withhold PO intake, and contact speech for re-assessment of swallow function. Recommend follow-up with speech therapy for diet upgrade.     Treatment Minutes:  Dysphagia treatment 8 minutes ([id number])    Henok Cantu M.A., Gianfranco Delgado [phone]  Speech-Language Pathologist

## 2019-06-21 NOTE — PROGRESS NOTES
Report given to [facility], [address] [facility], at [address] [facility]. Vides removed, no complications noted.

## 2019-06-21 NOTE — PROGRESS NOTES
Physical Therapy/Occupational Therapy  HOLD PT/OT this AM 2/2 low Hgb (6.7) and need for blood transfusion. Will follow up later this date as appropriate.      John Moore, PT, DPT #[id number]  Huber Garcia OTR/L [id number]

## 2019-06-21 NOTE — PROGRESS NOTES
Prn ativan given for increased restlessness and agitation. See MAR. Will reassess and continue to monitor.

## 2019-06-25 LAB
BLOOD BANK DISPENSE STATUS: NORMAL
BLOOD BANK DISPENSE STATUS: NORMAL
BLOOD BANK PRODUCT CODE: NORMAL
BLOOD BANK PRODUCT CODE: NORMAL
BPU ID: NORMAL
BPU ID: NORMAL
DESCRIPTION BLOOD BANK: NORMAL
DESCRIPTION BLOOD BANK: NORMAL

## 2019-07-11 ENCOUNTER — OFFICE VISIT (OUTPATIENT)
Dept: ORTHOPEDIC SURGERY | Age: 79
End: 2019-07-11

## 2019-07-11 VITALS — HEIGHT: 62 IN | WEIGHT: 159 LBS | BODY MASS INDEX: 29.26 KG/M2

## 2019-07-11 DIAGNOSIS — M25.551 HIP PAIN, RIGHT: Primary | ICD-10-CM

## 2019-07-11 PROCEDURE — 99024 POSTOP FOLLOW-UP VISIT: CPT | Performed by: ORTHOPAEDIC SURGERY

## 2019-08-22 ENCOUNTER — OFFICE VISIT (OUTPATIENT)
Dept: ORTHOPEDIC SURGERY | Age: 79
End: 2019-08-22

## 2019-08-22 VITALS — WEIGHT: 158.95 LBS | HEIGHT: 62 IN | BODY MASS INDEX: 29.25 KG/M2

## 2019-08-22 DIAGNOSIS — M25.551 HIP PAIN, RIGHT: Primary | ICD-10-CM

## 2019-08-22 PROCEDURE — 99024 POSTOP FOLLOW-UP VISIT: CPT | Performed by: ORTHOPAEDIC SURGERY

## 2019-09-20 ENCOUNTER — APPOINTMENT (OUTPATIENT)
Dept: CT IMAGING | Age: 79
DRG: 177 | End: 2019-09-20
Payer: MEDICARE

## 2019-09-20 ENCOUNTER — APPOINTMENT (OUTPATIENT)
Dept: GENERAL RADIOLOGY | Age: 79
DRG: 177 | End: 2019-09-20
Payer: MEDICARE

## 2019-09-20 ENCOUNTER — HOSPITAL ENCOUNTER (INPATIENT)
Age: 79
LOS: 6 days | Discharge: HOSPICE | DRG: 177 | End: 2019-09-26
Attending: EMERGENCY MEDICINE | Admitting: INTERNAL MEDICINE
Payer: MEDICARE

## 2019-09-20 DIAGNOSIS — J96.01 ACUTE RESPIRATORY FAILURE WITH HYPOXIA (HCC): ICD-10-CM

## 2019-09-20 DIAGNOSIS — Z86.59 HISTORY OF DEMENTIA: ICD-10-CM

## 2019-09-20 DIAGNOSIS — N39.0 URINARY TRACT INFECTION WITHOUT HEMATURIA, SITE UNSPECIFIED: Primary | ICD-10-CM

## 2019-09-20 DIAGNOSIS — Z51.5 HOSPICE CARE: ICD-10-CM

## 2019-09-20 LAB
AMORPHOUS: ABNORMAL /HPF
ANION GAP SERPL CALCULATED.3IONS-SCNC: 11 MMOL/L (ref 3–16)
BACTERIA: ABNORMAL /HPF
BASOPHILS ABSOLUTE: 0 K/UL (ref 0–0.2)
BASOPHILS RELATIVE PERCENT: 0.5 %
BILIRUBIN URINE: NEGATIVE
BLOOD, URINE: NEGATIVE
BUN BLDV-MCNC: 19 MG/DL (ref 7–20)
CALCIUM SERPL-MCNC: 9.7 MG/DL (ref 8.3–10.6)
CHLORIDE BLD-SCNC: 104 MMOL/L (ref 99–110)
CLARITY: ABNORMAL
CO2: 25 MMOL/L (ref 21–32)
COLOR: YELLOW
CREAT SERPL-MCNC: 0.7 MG/DL (ref 0.6–1.2)
EKG ATRIAL RATE: 79 BPM
EKG DIAGNOSIS: NORMAL
EKG P AXIS: -15 DEGREES
EKG P-R INTERVAL: 160 MS
EKG Q-T INTERVAL: 402 MS
EKG QRS DURATION: 82 MS
EKG QTC CALCULATION (BAZETT): 460 MS
EKG R AXIS: -25 DEGREES
EKG T AXIS: -6 DEGREES
EKG VENTRICULAR RATE: 79 BPM
EOSINOPHILS ABSOLUTE: 0 K/UL (ref 0–0.6)
EOSINOPHILS RELATIVE PERCENT: 0 %
EPITHELIAL CELLS, UA: ABNORMAL /HPF
GFR AFRICAN AMERICAN: >60
GFR NON-AFRICAN AMERICAN: >60
GLUCOSE BLD-MCNC: 132 MG/DL (ref 70–99)
GLUCOSE URINE: NEGATIVE MG/DL
HCT VFR BLD CALC: 38.6 % (ref 36–48)
HEMOGLOBIN: 12.9 G/DL (ref 12–16)
KETONES, URINE: NEGATIVE MG/DL
LACTIC ACID, SEPSIS: 0.7 MMOL/L (ref 0.4–1.9)
LEUKOCYTE ESTERASE, URINE: ABNORMAL
LYMPHOCYTES ABSOLUTE: 1.6 K/UL (ref 1–5.1)
LYMPHOCYTES RELATIVE PERCENT: 19.8 %
MCH RBC QN AUTO: 30.8 PG (ref 26–34)
MCHC RBC AUTO-ENTMCNC: 33.4 G/DL (ref 31–36)
MCV RBC AUTO: 92.3 FL (ref 80–100)
MICROSCOPIC EXAMINATION: YES
MONOCYTES ABSOLUTE: 0.6 K/UL (ref 0–1.3)
MONOCYTES RELATIVE PERCENT: 6.9 %
NEUTROPHILS ABSOLUTE: 6 K/UL (ref 1.7–7.7)
NEUTROPHILS RELATIVE PERCENT: 72.8 %
NITRITE, URINE: NEGATIVE
PDW BLD-RTO: 13.8 % (ref 12.4–15.4)
PH UA: 6 (ref 5–8)
PLATELET # BLD: 235 K/UL (ref 135–450)
PMV BLD AUTO: 7 FL (ref 5–10.5)
POTASSIUM REFLEX MAGNESIUM: 3.8 MMOL/L (ref 3.5–5.1)
PRO-BNP: 253 PG/ML (ref 0–449)
PROCALCITONIN: 0.07 NG/ML (ref 0–0.15)
PROTEIN UA: NEGATIVE MG/DL
RBC # BLD: 4.19 M/UL (ref 4–5.2)
RBC UA: ABNORMAL /HPF (ref 0–2)
SODIUM BLD-SCNC: 140 MMOL/L (ref 136–145)
SPECIFIC GRAVITY UA: <=1.005 (ref 1–1.03)
TROPONIN: <0.01 NG/ML
URINE REFLEX TO CULTURE: YES
URINE TYPE: ABNORMAL
UROBILINOGEN, URINE: 0.2 E.U./DL
WBC # BLD: 8.2 K/UL (ref 4–11)
WBC UA: ABNORMAL /HPF (ref 0–5)

## 2019-09-20 PROCEDURE — 6370000000 HC RX 637 (ALT 250 FOR IP): Performed by: PHYSICIAN ASSISTANT

## 2019-09-20 PROCEDURE — 87086 URINE CULTURE/COLONY COUNT: CPT

## 2019-09-20 PROCEDURE — 84145 PROCALCITONIN (PCT): CPT

## 2019-09-20 PROCEDURE — 99285 EMERGENCY DEPT VISIT HI MDM: CPT

## 2019-09-20 PROCEDURE — 80048 BASIC METABOLIC PNL TOTAL CA: CPT

## 2019-09-20 PROCEDURE — 51798 US URINE CAPACITY MEASURE: CPT

## 2019-09-20 PROCEDURE — 94761 N-INVAS EAR/PLS OXIMETRY MLT: CPT

## 2019-09-20 PROCEDURE — 85025 COMPLETE CBC W/AUTO DIFF WBC: CPT

## 2019-09-20 PROCEDURE — 83605 ASSAY OF LACTIC ACID: CPT

## 2019-09-20 PROCEDURE — 6370000000 HC RX 637 (ALT 250 FOR IP): Performed by: EMERGENCY MEDICINE

## 2019-09-20 PROCEDURE — 6360000002 HC RX W HCPCS: Performed by: EMERGENCY MEDICINE

## 2019-09-20 PROCEDURE — 2580000003 HC RX 258: Performed by: EMERGENCY MEDICINE

## 2019-09-20 PROCEDURE — 83880 ASSAY OF NATRIURETIC PEPTIDE: CPT

## 2019-09-20 PROCEDURE — 99223 1ST HOSP IP/OBS HIGH 75: CPT | Performed by: INTERNAL MEDICINE

## 2019-09-20 PROCEDURE — 94640 AIRWAY INHALATION TREATMENT: CPT

## 2019-09-20 PROCEDURE — 92610 EVALUATE SWALLOWING FUNCTION: CPT

## 2019-09-20 PROCEDURE — 6370000000 HC RX 637 (ALT 250 FOR IP): Performed by: INTERNAL MEDICINE

## 2019-09-20 PROCEDURE — 92526 ORAL FUNCTION THERAPY: CPT

## 2019-09-20 PROCEDURE — 93005 ELECTROCARDIOGRAM TRACING: CPT | Performed by: EMERGENCY MEDICINE

## 2019-09-20 PROCEDURE — 71045 X-RAY EXAM CHEST 1 VIEW: CPT

## 2019-09-20 PROCEDURE — 96367 TX/PROPH/DG ADDL SEQ IV INF: CPT

## 2019-09-20 PROCEDURE — 84484 ASSAY OF TROPONIN QUANT: CPT

## 2019-09-20 PROCEDURE — 36415 COLL VENOUS BLD VENIPUNCTURE: CPT

## 2019-09-20 PROCEDURE — 96365 THER/PROPH/DIAG IV INF INIT: CPT

## 2019-09-20 PROCEDURE — 87040 BLOOD CULTURE FOR BACTERIA: CPT

## 2019-09-20 PROCEDURE — 70450 CT HEAD/BRAIN W/O DYE: CPT

## 2019-09-20 PROCEDURE — 6360000002 HC RX W HCPCS: Performed by: PHYSICIAN ASSISTANT

## 2019-09-20 PROCEDURE — 81001 URINALYSIS AUTO W/SCOPE: CPT

## 2019-09-20 PROCEDURE — 93010 ELECTROCARDIOGRAM REPORT: CPT | Performed by: INTERNAL MEDICINE

## 2019-09-20 PROCEDURE — 51701 INSERT BLADDER CATHETER: CPT

## 2019-09-20 PROCEDURE — 1200000000 HC SEMI PRIVATE

## 2019-09-20 PROCEDURE — 2700000000 HC OXYGEN THERAPY PER DAY

## 2019-09-20 PROCEDURE — 96375 TX/PRO/DX INJ NEW DRUG ADDON: CPT

## 2019-09-20 PROCEDURE — 71260 CT THORAX DX C+: CPT

## 2019-09-20 PROCEDURE — 6360000004 HC RX CONTRAST MEDICATION: Performed by: EMERGENCY MEDICINE

## 2019-09-20 RX ORDER — METHYLPREDNISOLONE SODIUM SUCCINATE 125 MG/2ML
125 INJECTION, POWDER, LYOPHILIZED, FOR SOLUTION INTRAMUSCULAR; INTRAVENOUS ONCE
Status: COMPLETED | OUTPATIENT
Start: 2019-09-20 | End: 2019-09-20

## 2019-09-20 RX ORDER — OLANZAPINE AND FLUOXETINE 6; 25 MG/1; MG/1
2 CAPSULE ORAL NIGHTLY
Status: DISCONTINUED | OUTPATIENT
Start: 2019-09-20 | End: 2019-09-20 | Stop reason: CLARIF

## 2019-09-20 RX ORDER — DIMETHICONE, OXYBENZONE, AND PADIMATE O 2; 2.5; 6.6 G/100G; G/100G; G/100G
STICK TOPICAL PRN
Status: DISCONTINUED | OUTPATIENT
Start: 2019-09-20 | End: 2019-09-26 | Stop reason: HOSPADM

## 2019-09-20 RX ORDER — IPRATROPIUM BROMIDE AND ALBUTEROL SULFATE 2.5; .5 MG/3ML; MG/3ML
1 SOLUTION RESPIRATORY (INHALATION)
Status: DISCONTINUED | OUTPATIENT
Start: 2019-09-20 | End: 2019-09-26 | Stop reason: HOSPADM

## 2019-09-20 RX ORDER — ALBUTEROL SULFATE 2.5 MG/3ML
2.5 SOLUTION RESPIRATORY (INHALATION)
Status: DISCONTINUED | OUTPATIENT
Start: 2019-09-20 | End: 2019-09-26 | Stop reason: HOSPADM

## 2019-09-20 RX ORDER — MORPHINE SULFATE 2 MG/ML
2 INJECTION, SOLUTION INTRAMUSCULAR; INTRAVENOUS ONCE
Status: COMPLETED | OUTPATIENT
Start: 2019-09-20 | End: 2019-09-20

## 2019-09-20 RX ORDER — SODIUM CHLORIDE 0.9 % (FLUSH) 0.9 %
10 SYRINGE (ML) INJECTION PRN
Status: DISCONTINUED | OUTPATIENT
Start: 2019-09-20 | End: 2019-09-20

## 2019-09-20 RX ORDER — SODIUM CHLORIDE 0.9 % (FLUSH) 0.9 %
10 SYRINGE (ML) INJECTION EVERY 12 HOURS SCHEDULED
Status: DISCONTINUED | OUTPATIENT
Start: 2019-09-20 | End: 2019-09-26 | Stop reason: HOSPADM

## 2019-09-20 RX ORDER — OLANZAPINE 5 MG/1
12.5 TABLET ORAL NIGHTLY
Status: DISCONTINUED | OUTPATIENT
Start: 2019-09-20 | End: 2019-09-20

## 2019-09-20 RX ORDER — IPRATROPIUM BROMIDE AND ALBUTEROL SULFATE 2.5; .5 MG/3ML; MG/3ML
1 SOLUTION RESPIRATORY (INHALATION) ONCE
Status: COMPLETED | OUTPATIENT
Start: 2019-09-20 | End: 2019-09-20

## 2019-09-20 RX ORDER — AMLODIPINE BESYLATE 2.5 MG/1
2.5 TABLET ORAL DAILY
Status: DISCONTINUED | OUTPATIENT
Start: 2019-09-20 | End: 2019-09-22

## 2019-09-20 RX ORDER — SODIUM CHLORIDE 0.9 % (FLUSH) 0.9 %
10 SYRINGE (ML) INJECTION PRN
Status: DISCONTINUED | OUTPATIENT
Start: 2019-09-20 | End: 2019-09-26 | Stop reason: HOSPADM

## 2019-09-20 RX ORDER — LEVOFLOXACIN 5 MG/ML
500 INJECTION, SOLUTION INTRAVENOUS EVERY 24 HOURS
Status: DISCONTINUED | OUTPATIENT
Start: 2019-09-20 | End: 2019-09-25

## 2019-09-20 RX ORDER — ACETAMINOPHEN 325 MG/1
650 TABLET ORAL EVERY 4 HOURS PRN
Status: DISCONTINUED | OUTPATIENT
Start: 2019-09-20 | End: 2019-09-26 | Stop reason: HOSPADM

## 2019-09-20 RX ORDER — METOPROLOL SUCCINATE 25 MG/1
25 TABLET, EXTENDED RELEASE ORAL DAILY
Status: DISCONTINUED | OUTPATIENT
Start: 2019-09-20 | End: 2019-09-25

## 2019-09-20 RX ORDER — ASPIRIN 81 MG/1
81 TABLET ORAL DAILY
Status: DISCONTINUED | OUTPATIENT
Start: 2019-09-20 | End: 2019-09-25

## 2019-09-20 RX ORDER — ONDANSETRON 2 MG/ML
4 INJECTION INTRAMUSCULAR; INTRAVENOUS EVERY 6 HOURS PRN
Status: DISCONTINUED | OUTPATIENT
Start: 2019-09-20 | End: 2019-09-26 | Stop reason: HOSPADM

## 2019-09-20 RX ORDER — FERROUS SULFATE 325(65) MG
325 TABLET ORAL
Status: DISCONTINUED | OUTPATIENT
Start: 2019-09-21 | End: 2019-09-25

## 2019-09-20 RX ORDER — 0.9 % SODIUM CHLORIDE 0.9 %
1000 INTRAVENOUS SOLUTION INTRAVENOUS ONCE
Status: COMPLETED | OUTPATIENT
Start: 2019-09-20 | End: 2019-09-20

## 2019-09-20 RX ORDER — ATORVASTATIN CALCIUM 10 MG/1
20 TABLET, FILM COATED ORAL NIGHTLY
Status: DISCONTINUED | OUTPATIENT
Start: 2019-09-20 | End: 2019-09-25

## 2019-09-20 RX ORDER — OLANZAPINE 5 MG/1
12.5 TABLET ORAL NIGHTLY
Status: DISCONTINUED | OUTPATIENT
Start: 2019-09-21 | End: 2019-09-22

## 2019-09-20 RX ORDER — SODIUM CHLORIDE 0.9 % (FLUSH) 0.9 %
10 SYRINGE (ML) INJECTION EVERY 12 HOURS SCHEDULED
Status: DISCONTINUED | OUTPATIENT
Start: 2019-09-20 | End: 2019-09-20

## 2019-09-20 RX ORDER — METHYLPREDNISOLONE SODIUM SUCCINATE 40 MG/ML
40 INJECTION, POWDER, LYOPHILIZED, FOR SOLUTION INTRAMUSCULAR; INTRAVENOUS DAILY
Status: DISCONTINUED | OUTPATIENT
Start: 2019-09-21 | End: 2019-09-22

## 2019-09-20 RX ORDER — PANTOPRAZOLE SODIUM 40 MG/1
40 TABLET, DELAYED RELEASE ORAL
Status: DISCONTINUED | OUTPATIENT
Start: 2019-09-21 | End: 2019-09-25

## 2019-09-20 RX ADMIN — IOPAMIDOL 85 ML: 755 INJECTION, SOLUTION INTRAVENOUS at 08:59

## 2019-09-20 RX ADMIN — IPRATROPIUM BROMIDE AND ALBUTEROL SULFATE 1 AMPULE: .5; 3 SOLUTION RESPIRATORY (INHALATION) at 23:48

## 2019-09-20 RX ADMIN — ENOXAPARIN SODIUM 40 MG: 40 INJECTION SUBCUTANEOUS at 13:12

## 2019-09-20 RX ADMIN — IPRATROPIUM BROMIDE AND ALBUTEROL SULFATE 1 AMPULE: .5; 3 SOLUTION RESPIRATORY (INHALATION) at 15:30

## 2019-09-20 RX ADMIN — SODIUM CHLORIDE 1000 ML: 9 INJECTION, SOLUTION INTRAVENOUS at 09:38

## 2019-09-20 RX ADMIN — VANCOMYCIN HYDROCHLORIDE 1000 MG: 1 INJECTION, POWDER, LYOPHILIZED, FOR SOLUTION INTRAVENOUS at 10:19

## 2019-09-20 RX ADMIN — CEFEPIME 2 G: 2 INJECTION, POWDER, FOR SOLUTION INTRAVENOUS at 09:38

## 2019-09-20 RX ADMIN — ATORVASTATIN CALCIUM 20 MG: 10 TABLET, FILM COATED ORAL at 20:25

## 2019-09-20 RX ADMIN — MORPHINE SULFATE 2 MG: 2 INJECTION, SOLUTION INTRAMUSCULAR; INTRAVENOUS at 09:52

## 2019-09-20 RX ADMIN — IPRATROPIUM BROMIDE AND ALBUTEROL SULFATE 1 AMPULE: .5; 3 SOLUTION RESPIRATORY (INHALATION) at 20:21

## 2019-09-20 RX ADMIN — IPRATROPIUM BROMIDE AND ALBUTEROL SULFATE 1 AMPULE: .5; 3 SOLUTION RESPIRATORY (INHALATION) at 11:41

## 2019-09-20 RX ADMIN — METHYLPREDNISOLONE SODIUM SUCCINATE 125 MG: 125 INJECTION, POWDER, FOR SOLUTION INTRAMUSCULAR; INTRAVENOUS at 07:23

## 2019-09-20 RX ADMIN — IPRATROPIUM BROMIDE AND ALBUTEROL SULFATE 1 AMPULE: .5; 3 SOLUTION RESPIRATORY (INHALATION) at 07:23

## 2019-09-20 RX ADMIN — ACETAMINOPHEN 650 MG: 325 TABLET ORAL at 20:24

## 2019-09-20 RX ADMIN — LEVOFLOXACIN 500 MG: 5 INJECTION, SOLUTION INTRAVENOUS at 13:12

## 2019-09-20 ASSESSMENT — PAIN SCALES - PAIN ASSESSMENT IN ADVANCED DEMENTIA (PAINAD)
BODYLANGUAGE: 0
TOTALSCORE: 0
FACIALEXPRESSION: 0
BREATHING: 0
CONSOLABILITY: 0
NEGVOCALIZATION: 0

## 2019-09-20 ASSESSMENT — PAIN SCALES - GENERAL
PAINLEVEL_OUTOF10: 10
PAINLEVEL_OUTOF10: 0
PAINLEVEL_OUTOF10: 0

## 2019-09-20 NOTE — PROGRESS NOTES
Yes  Duration/Frequency of Treatment: 3-5x/wk  D/C Recommendations: To be determined       Recommended Diet and Intervention  Diet Solids Recommendation: Dysphagia Pureed (Dysphagia I)  Liquid Consistency Recommendation: Mildly Thick (Nectar)  Recommended Form of Meds: Crushed in puree as able  Recommendations: Dysphagia treatment  Therapeutic Interventions: Patient/Family education;Diet tolerance monitoring;Oral care    Compensatory Swallowing Strategies  Compensatory Swallowing Strategies: Alternate solids and liquids;Small bites/sips;Assist feed;Eat/Feed slowly; No straws;Remain upright for 30-45 minutes after meals;Upright as possible for all oral intake    Treatment/Goals  Short-term Goals  Timeframe for Short-term Goals: 5 days (9/25)  Goal 1: The patient will tolerate current diet with no clinical s/s of aspiration 10/10  Goal 2: The patient will tolerate thin liquid trials with no clinical s/s of aspiration or penetration 10/10  Goal 3: The patient/caregiver will demonstrate understanding of compensatory strategies for improved swallow safety  Long-term Goals  Timeframe for Long-term Goals: 7 days (9/27)  Goal 1: The patient will tolerate least restrictive oral intake with no s/s of aspiration for optimal nutrition and hydration    General  Chart Reviewed: Yes  Comments: Chart reviewed for completion of BSE  Subjective  Subjective: The patient is seen in room at bedside with RN permission. The patiet is confused yet pleasant. Unable to follow commands or answer questions at this time  Behavior/Cognition: Alert;Confused; Distractible;Requires cueing  Temperature Spikes Noted: No  Respiratory Status: O2 via nasual cannula  Breath Sounds: Clear  O2 Device: Nasal cannula  Liters of Oxygen: 2 L  Communication Observation: Functional  Follows Directions: None  Dentition: Edentulous  Patient Positioning: Upright in bed  Baseline Vocal Quality: Normal  Prior Dysphagia History: The patient was seen by speech therapy

## 2019-09-20 NOTE — H&P
Hospital Medicine History & Physical      PCP: Amari Calderon MD    Date of Admission: 9/20/2019    Date of Service: Pt seen/examined on 9/20/2019     Chief Complaint:    Chief Complaint   Patient presents with    Shortness of Breath       History Of Present Illness: The patient is a 78 y.o. female with CAD, hypertension, hyperlipidemia, GERD, arthritis, Bipolar disorder, Depression, anxiety, and dementia who presents to [facility] with c/o shortness of breath. Unable to obtain HPI from patient, obtained per EMR. Her oxygen saturation was dropping while she was sleeping. Ongoing for the last 4-5 days. It was dropping into the 70's. When she wakes up, she is normally 98-99 on RA. Her SpO2 was only 90% this morning. Upon EMS arrival, SpO2 90% with audible wheezing. She was admitted to [facility] in June for femoral fracture. She lives with her son and has home health care. She has been declining since being at home. Her son has had to carry her entire body weight when transferring her to and from the toilet. Patient found to have UTI and pneumonia vs bronchitis. Admitted to med-surg. Past Medical History:        Diagnosis Date    Anxiety     Arthritis     B[facility] disorder ([facility]     Depression     Diverticulosis     GERD (gastroesophageal reflux disease)     Hyperlipidemia     Hypertension     NSTEMI (non-ST elevated myocardial infarction) ([facility] 5/23/15    suspect microvasc dzDr. La       Past Surgical History:        Procedure Laterality Date    CATARACT REMOVAL WITH IMPLANT Left 5/14/2014    CHOLECYSTECTOMY      COLON SURGERY  1991    COLONOSCOPY  07/02/2015    diverticulosis    CORONARY ANGIOPLASTY      CYSTOSCOPY Right 03/23/2017    Cysto, retro, ureteroscopy.  Stone manipulation with out extraction right stent placement     FEMUR FRACTURE SURGERY Right 6/18/2019    INTRAMEDULLARY NAILING performed by Wilfrido Boudreaux MD at [facility]

## 2019-09-20 NOTE — PLAN OF CARE
SLP completed evaluation. Please refer to notes in EMR.     Aixa Hunt M.A., [address] [facility] #[id number]  Speech-Language Pathologist

## 2019-09-20 NOTE — ED TRIAGE NOTES
Pt arrives via ems with c/o sob. pts son called 44 saying pt was having wheezing and trouble breathing. Ems reports that son stated her oxygen was low at home in the 70's. Ems gave one duoneb breathing treatment. Pt oxygen level within normal limits on arrival to ER. Pt is alert to place and name but not year. Pt difficult to understand. Pt has dementia per ems.

## 2019-09-20 NOTE — ED PROVIDER NOTES
extraction right stent placement     FEMUR FRACTURE SURGERY Right 6/18/2019    INTRAMEDULLARY NAILING performed by Bárbara Garner MD at [place] [id number]      HIP SURGERY Right 06/18/2019    INTRAMEDULLARY NAILING (RIGHT HIP)    HYSTERECTOMY, TOTAL ABDOMINAL  1980    OTHER SURGICAL HISTORY  05/28/2014    phacoemulsification of cataract with intraocular lens implant right eye    VARICOSE VEIN SURGERY           CURRENT MEDICATIONS       Previous Medications    AMLODIPINE (NORVASC) 2.5 MG TABLET    Take 1 tablet by mouth daily    ASPIRIN EC 81 MG EC TABLET    Take 1 tablet by mouth daily With food    ATORVASTATIN (LIPITOR) 20 MG TABLET    Take 1 tablet by mouth nightly    CALCIUM CARBONATE 600 MG TABS TABLET    Take 1 tablet by mouth daily    CYCLOBENZAPRINE (FLEXERIL) 10 MG TABLET    Take 10 mg by mouth nightly    DICLOFENAC SODIUM (VOLTAREN) 1 % GEL    Apply 2 g topically 4 times daily    FERROUS SULFATE 325 (65 FE) MG TABLET    Take 325 mg by mouth daily (with breakfast)    FISH OIL-OMEGA-3 FATTY ACIDS 1000 MG CAPSULE    Take 1 g by mouth daily     LANSOPRAZOLE (PREVACID) 30 MG DELAYED RELEASE CAPSULE    TAKE 1 CAPSULE DAILY. LORAZEPAM (ATIVAN) 0.5 MG TABLET    Take 0.5 mg by mouth 2 times daily.     MELATONIN 10 MG TABS    Take 10 mg by mouth nightly as needed    METHYLCELLULOSE (CITRUCEL) 500 MG TABS    Take 1 tablet by mouth daily    METOPROLOL SUCCINATE (TOPROL XL) 50 MG EXTENDED RELEASE TABLET    Take 1 tablet by mouth daily    MIRABEGRON ER (MYRBETRIQ) 25 MG TB24    TAKE 1 TABLET DAILY    MULTIPLE VITAMINS-MINERALS (THERAPEUTIC MULTIVITAMIN-MINERALS) TABLET    Take 1 tablet by mouth daily    NITROGLYCERIN (NITROSTAT) 0.4 MG SL TABLET    Place 1 tablet under the tongue every 5 minutes as needed for Chest pain    OLANZAPINE-FLUOXETINE (SYMBYAX) 6-25 MG CAPS CAPSULE    TAKE 1 CAPSULE EVERY       EVENING    POLYETHYLENE GLYCOL (GLYCOLAX) POWDER    POUR 17GM OF POWDER Temp Temp src Pulse Resp SpO2 Height Weight   -- -- -- -- -- -- -- --       Constitutional: well-developed, well-nourished, no acute distress, nontoxic appearance  HEENT: normocephalic, atraumatic, oropharynx moist, nares patent  Neck: normal range of motion, no tenderness, trachea midline, no stridor  Eyes: PERRLA, EOMI, conjunctiva normal  Respiratory: Expiratory wheezing bilaterally, no accessory muscle use  Cardiovascular: normal heart rate, normal rhythm  GI: nontender, bowel sounds normal, soft, nondistended, no pulsatile masses  Musculoskeletal: intact distal pulses, no clubbing, cyanosis, or edema. Good range of motion  Back: no tenderness  Integument: warm, dry, no erythema, no rash, < 2 second cap refill  Neurologic: alert and oriented ×2, moves all extremities, difficult to understand speech    DIAGNOSTIC RESULTS     EKG: Normal sinus rhythm, rate 79, normal QRS, normal QT, no ST depression or elevation, T wave inversion noted in V1 through V3 as well as 3 and aVF which appear new when compared to EKG from June 18, 2019    All EKG's are interpreted by the Emergency Department Physician who either signs or Co-signs this chart in the absence of a cardiologist.      RADIOLOGY:   Interpretation per the Radiologist below, if available at the time of this note:    CT Chest Pulmonary Embolism W Contrast   Final Result   Motion limited study. No evidence for pulmonary embolism or right heart   strain. Bibasilar dependent atelectasis/scarring. Ground-glass opacities in the   lower lobes bilaterally. CT Head WO Contrast   Final Result   No acute intracranial abnormality. XR CHEST PORTABLE   Final Result   Chronic interstitial changes, similar to prior.                LABS:  Labs Reviewed   BASIC METABOLIC PANEL W/ REFLEX TO MG FOR LOW K - Abnormal; Notable for the following components:       Result Value    Glucose 132 (*)     All other components within normal limits    Narrative:

## 2019-09-20 NOTE — LETTER
Beneficiary Notification Letter     This [facility] Provider is Participating in an Innovative Payment and [address] [place] from Justyna Orr:   [facility] is participating in a Medicare initiative called the [facility] [address] [facility]. You are receiving this letter because your health care provider has identified you as a patient who is receiving care through this initiative. Health care providers participating in the [facility] [facility], including [facility], will work with Medicare to improve care for patients. Your Medicare rights have not been changed. You still have all the same Medicare rights and protections, including the right to choose which hospital, doctor, or other health care provider you see. However, because [facility] chose to participate in the [facility], all Medicare beneficiaries who meet the eligibility criteria of this initiative will receive care under the initiative. If you do not wish to receive care under the [employer] [facility] [address] [facility] [facility], you must choose a health care provider that does not participate in this initiative for your care. Regardless of which health care provider you see, Medicare will continue to cover all of your medically necessary services. [employer] for Care Improvement Advanced aims to help improve your care     The [employer] [facility] [address] [facility] is an innovative Medicare initiative that encourages your doctors, hospitals, and other health care providers to work more closely together so you get better care during and following certain hospital stays.  In this initiative, doctors and hospitals may work closely with certain health care providers and suppliers that help patients recover after discharge from the hospital,

## 2019-09-21 LAB
ANION GAP SERPL CALCULATED.3IONS-SCNC: 10 MMOL/L (ref 3–16)
BASOPHILS ABSOLUTE: 0 K/UL (ref 0–0.2)
BASOPHILS RELATIVE PERCENT: 0.2 %
BUN BLDV-MCNC: 20 MG/DL (ref 7–20)
CALCIUM SERPL-MCNC: 9.3 MG/DL (ref 8.3–10.6)
CHLORIDE BLD-SCNC: 103 MMOL/L (ref 99–110)
CO2: 25 MMOL/L (ref 21–32)
CREAT SERPL-MCNC: 0.6 MG/DL (ref 0.6–1.2)
EOSINOPHILS ABSOLUTE: 0 K/UL (ref 0–0.6)
EOSINOPHILS RELATIVE PERCENT: 0 %
GFR AFRICAN AMERICAN: >60
GFR NON-AFRICAN AMERICAN: >60
GLUCOSE BLD-MCNC: 106 MG/DL (ref 70–99)
HCT VFR BLD CALC: 36.7 % (ref 36–48)
HEMOGLOBIN: 12.2 G/DL (ref 12–16)
LYMPHOCYTES ABSOLUTE: 1.3 K/UL (ref 1–5.1)
LYMPHOCYTES RELATIVE PERCENT: 16.5 %
MCH RBC QN AUTO: 30.6 PG (ref 26–34)
MCHC RBC AUTO-ENTMCNC: 33.3 G/DL (ref 31–36)
MCV RBC AUTO: 92.2 FL (ref 80–100)
MONOCYTES ABSOLUTE: 0.8 K/UL (ref 0–1.3)
MONOCYTES RELATIVE PERCENT: 9.7 %
NEUTROPHILS ABSOLUTE: 5.9 K/UL (ref 1.7–7.7)
NEUTROPHILS RELATIVE PERCENT: 73.6 %
PDW BLD-RTO: 13.9 % (ref 12.4–15.4)
PLATELET # BLD: 236 K/UL (ref 135–450)
PMV BLD AUTO: 7 FL (ref 5–10.5)
POTASSIUM REFLEX MAGNESIUM: 3.8 MMOL/L (ref 3.5–5.1)
RBC # BLD: 3.98 M/UL (ref 4–5.2)
SODIUM BLD-SCNC: 138 MMOL/L (ref 136–145)
URINE CULTURE, ROUTINE: NORMAL
WBC # BLD: 8 K/UL (ref 4–11)

## 2019-09-21 PROCEDURE — 2580000003 HC RX 258: Performed by: PHYSICIAN ASSISTANT

## 2019-09-21 PROCEDURE — 94761 N-INVAS EAR/PLS OXIMETRY MLT: CPT

## 2019-09-21 PROCEDURE — 6370000000 HC RX 637 (ALT 250 FOR IP): Performed by: PHYSICIAN ASSISTANT

## 2019-09-21 PROCEDURE — 97163 PT EVAL HIGH COMPLEX 45 MIN: CPT

## 2019-09-21 PROCEDURE — 1200000000 HC SEMI PRIVATE

## 2019-09-21 PROCEDURE — 85025 COMPLETE CBC W/AUTO DIFF WBC: CPT

## 2019-09-21 PROCEDURE — 97535 SELF CARE MNGMENT TRAINING: CPT

## 2019-09-21 PROCEDURE — 97167 OT EVAL HIGH COMPLEX 60 MIN: CPT

## 2019-09-21 PROCEDURE — 84443 ASSAY THYROID STIM HORMONE: CPT

## 2019-09-21 PROCEDURE — 2700000000 HC OXYGEN THERAPY PER DAY

## 2019-09-21 PROCEDURE — 94640 AIRWAY INHALATION TREATMENT: CPT

## 2019-09-21 PROCEDURE — 36415 COLL VENOUS BLD VENIPUNCTURE: CPT

## 2019-09-21 PROCEDURE — 80048 BASIC METABOLIC PNL TOTAL CA: CPT

## 2019-09-21 PROCEDURE — 97530 THERAPEUTIC ACTIVITIES: CPT

## 2019-09-21 PROCEDURE — 6370000000 HC RX 637 (ALT 250 FOR IP): Performed by: INTERNAL MEDICINE

## 2019-09-21 PROCEDURE — 6360000002 HC RX W HCPCS: Performed by: PHYSICIAN ASSISTANT

## 2019-09-21 RX ADMIN — LEVOFLOXACIN 500 MG: 5 INJECTION, SOLUTION INTRAVENOUS at 13:10

## 2019-09-21 RX ADMIN — ASPIRIN 81 MG: 81 TABLET, COATED ORAL at 09:23

## 2019-09-21 RX ADMIN — AMLODIPINE BESYLATE 2.5 MG: 2.5 TABLET ORAL at 09:23

## 2019-09-21 RX ADMIN — FERROUS SULFATE TAB 325 MG (65 MG ELEMENTAL FE) 325 MG: 325 (65 FE) TAB at 09:23

## 2019-09-21 RX ADMIN — METHYLPREDNISOLONE SODIUM SUCCINATE 40 MG: 40 INJECTION, POWDER, FOR SOLUTION INTRAMUSCULAR; INTRAVENOUS at 06:01

## 2019-09-21 RX ADMIN — ENOXAPARIN SODIUM 40 MG: 40 INJECTION SUBCUTANEOUS at 09:23

## 2019-09-21 RX ADMIN — IPRATROPIUM BROMIDE AND ALBUTEROL SULFATE 1 AMPULE: .5; 3 SOLUTION RESPIRATORY (INHALATION) at 07:30

## 2019-09-21 RX ADMIN — IPRATROPIUM BROMIDE AND ALBUTEROL SULFATE 1 AMPULE: .5; 3 SOLUTION RESPIRATORY (INHALATION) at 11:08

## 2019-09-21 RX ADMIN — PANTOPRAZOLE SODIUM 40 MG: 40 TABLET, DELAYED RELEASE ORAL at 06:01

## 2019-09-21 RX ADMIN — IPRATROPIUM BROMIDE AND ALBUTEROL SULFATE 1 AMPULE: .5; 3 SOLUTION RESPIRATORY (INHALATION) at 20:21

## 2019-09-21 RX ADMIN — Medication 10 ML: at 06:02

## 2019-09-21 RX ADMIN — Medication 10 ML: at 09:23

## 2019-09-21 RX ADMIN — METOPROLOL SUCCINATE 25 MG: 25 TABLET, FILM COATED, EXTENDED RELEASE ORAL at 09:23

## 2019-09-21 ASSESSMENT — PAIN SCALES - PAIN ASSESSMENT IN ADVANCED DEMENTIA (PAINAD)
BREATHING: 0
FACIALEXPRESSION: 0
TOTALSCORE: 2
BODYLANGUAGE: 0
CONSOLABILITY: 1
NEGVOCALIZATION: 1

## 2019-09-21 NOTE — FLOWSHEET NOTE
09/21/19 0159   Vital Signs   Temp 97.6 °F (36.4 °C)   Temp Source Axillary   Pulse 80   Heart Rate Source Monitor   Resp 18   BP (!) 183/95  (high b/p)   BP Location Left upper arm   BP Upper/Lower Upper   Patient Position Semi fowlers   Level of Consciousness 0   MEWS Score 1   Oxygen Therapy   SpO2 94 %   O2 Device Nasal cannula   O2 Flow Rate (L/min) 1.5 L/min   Pt resting in bed, voided on bedpan. No acute distress.  Ria Fritz

## 2019-09-21 NOTE — PROGRESS NOTES
Pt's sister into visit, update given. Pt currently resting in bed mumbling. Pt did call sister by name correctly but them went back to mumbling. Sister stated mumbling is normal for pt.

## 2019-09-21 NOTE — PROGRESS NOTES
Hospitalist Progress Note      PCP: Tanner De Los Santos MD    Date of Admission: 9/20/2019    Chief Complaint: SOB. Subjective: pt unable to provide any hx. She is mumbling incoherently. Medications:  Reviewed    Infusion Medications   Scheduled Medications    amLODIPine  2.5 mg Oral Daily    aspirin EC  81 mg Oral Daily    atorvastatin  20 mg Oral Nightly    ferrous sulfate  325 mg Oral Daily with breakfast    pantoprazole  40 mg Oral QAM AC    metoprolol succinate  25 mg Oral Daily    sodium chloride flush  10 mL Intravenous 2 times per day    enoxaparin  40 mg Subcutaneous Daily    ipratropium-albuterol  1 ampule Inhalation Q4H WA    levofloxacin  500 mg Intravenous Q24H    methylPREDNISolone  40 mg Intravenous Daily    OLANZapine  12.5 mg Oral Nightly    And    FLUoxetine  50 mg Oral Nightly     PRN Meds: sodium chloride flush, magnesium hydroxide, ondansetron, albuterol, acetaminophen, medicated lip balm      Intake/Output Summary (Last 24 hours) at 9/21/2019 1415  Last data filed at 9/21/2019 1332  Gross per 24 hour   Intake 340 ml   Output 750 ml   Net -410 ml       Physical Exam Performed:    BP (!) 177/96   Pulse 104   Temp 99 °F (37.2 °C) (Oral)   Resp 17   Ht 5' 1\" (1.549 m)   Wt 158 lb (71.7 kg)   SpO2 97%   BMI 29.85 kg/m²     Gen: No distress. Alert. Eyes: PERRL. No sclera icterus. No conjunctival injection. ENT: No discharge. Pharynx clear. Neck: No JVD. No Carotid Bruit. Trachea midline. Resp: No accessory muscle use. + Bibasilar crackles. No wheezes. No rhonchi. CV: Regular rate. Regular rhythm. No murmur. No rub. No edema. GI: Non-tender. Non-distended. Normal bowel sounds. No hernia. Skin: Warm and dry. No nodule on exposed extremities. No rash on exposed extremities. M/S: No cyanosis. No joint deformity. No clubbing. Neuro: Awake.  Grossly nonfocal    Psych: disoriented, rapid mostly unintelligible speech.          Labs:   Recent Labs

## 2019-09-21 NOTE — PROGRESS NOTES
Speech Language Pathology  SLP Dysphagia Daily Treatment ATTEMPT  [facility] Unit:  2W      SLP spoke with nurse, Bill Yanira Ramon, who indicated that it was OK for SLP to see patient. Nurse, Bill Yanira Ramon, reported that patient has been refusing PO trials. Patient took x1 drink via spoon of thickened liquid and 1 bite of applesauce and then refused further PO trials. She would purse her lips shut and say \"no\" when SLP attempted to feed patient. Encouraged patient to eat because it would help get her stronger but she continued to refuse. Patient demonstrates a hard, audible swallow with all PO trials. She also demonstrates multiple swallows to clear PO. Patient may benefit from an MBS to ensure proper diet recommendations. Continue to make sure patient is fully awake and head of bed is up at 90 degree before all PO trials. Also continue to keep head of bed up for 30 minutes after PO trials due to patient demonstrating swallows after PO has been cleared.     [facility] Pathologist   MA [facility]-SL[facility] #[id number]

## 2019-09-21 NOTE — PROGRESS NOTES
2    Transfer Training     Sit to stand:   Not Tested  Stand to sit:   Not Tested  Bed to Chair:  N/A with use of N/A      Activity Tolerance   Pt completed therapy session with Pain noted with rolling,L shoulder  SpO2: 95%  HR:98  BP:     Positioning Needs   Pt remained in bed, call light and needs in reach, alarm set and pillows placed for support/comfort    Exercises Initiated    AP    Other  None. Patient/Family Education   Pt educated on role of inpatient PT, POC, importance of continued activity, DC recommendations and calling for assist with mobility. Son arrived post session-discussed dc plans with him- he would like to have patient dc to home if she is able to participate in care/ is more alert    Assessment  Pt seen for Physical Therapy evaluation in acute care setting. Pt demonstrated decreased Activity tolerance, Safety and Strength and decreased independence with Ambulation, Bed Mobility  and Transfers. Goals : To be met in 3 visits:  1). Tolerate transfer from bed to chair    To be met in 6 visits:  1). Supine to/from sit: tolerate assessment  2). Sit to/from stand: tolerate asssessment  3). Bed to chair: with LRAD  4). Roll S-S with Mod A  5). Tolerate B LE exercises 3 sets of 10-15 reps  . Rehabilitation Potential: Fair  Strengths for achieving goals include:   Family Support and Pt cooperative  Barriers to achieving goals include:    Complexity of condition and Weakness    Plan    To be seen 3-5 x / week  while in acute care setting for therapeutic exercises, bed mobility, transfers, progressive gait training, balance training, and family/patient education. Carrol Rivera, PT #[id number]    If patient discharges from this facility prior to next visit, this note will serve as the Discharge Summary.

## 2019-09-22 LAB
GLUCOSE BLD-MCNC: 114 MG/DL (ref 70–99)
GLUCOSE BLD-MCNC: 128 MG/DL (ref 70–99)
PERFORMED ON: ABNORMAL
PERFORMED ON: ABNORMAL
TSH SERPL DL<=0.05 MIU/L-ACNC: 2.56 UIU/ML (ref 0.27–4.2)

## 2019-09-22 PROCEDURE — 6370000000 HC RX 637 (ALT 250 FOR IP): Performed by: INTERNAL MEDICINE

## 2019-09-22 PROCEDURE — 2580000003 HC RX 258: Performed by: PHYSICIAN ASSISTANT

## 2019-09-22 PROCEDURE — 2580000003 HC RX 258: Performed by: INTERNAL MEDICINE

## 2019-09-22 PROCEDURE — 2060000000 HC ICU INTERMEDIATE R&B

## 2019-09-22 PROCEDURE — 94761 N-INVAS EAR/PLS OXIMETRY MLT: CPT

## 2019-09-22 PROCEDURE — 94640 AIRWAY INHALATION TREATMENT: CPT

## 2019-09-22 PROCEDURE — 2500000003 HC RX 250 WO HCPCS: Performed by: INTERNAL MEDICINE

## 2019-09-22 PROCEDURE — 2700000000 HC OXYGEN THERAPY PER DAY

## 2019-09-22 PROCEDURE — 6360000002 HC RX W HCPCS: Performed by: PHYSICIAN ASSISTANT

## 2019-09-22 PROCEDURE — 6360000002 HC RX W HCPCS: Performed by: INTERNAL MEDICINE

## 2019-09-22 RX ORDER — QUETIAPINE FUMARATE 25 MG/1
25 TABLET, FILM COATED ORAL NIGHTLY
Status: DISCONTINUED | OUTPATIENT
Start: 2019-09-22 | End: 2019-09-26 | Stop reason: HOSPADM

## 2019-09-22 RX ORDER — SODIUM CHLORIDE 9 MG/ML
INJECTION, SOLUTION INTRAVENOUS CONTINUOUS
Status: DISCONTINUED | OUTPATIENT
Start: 2019-09-22 | End: 2019-09-25

## 2019-09-22 RX ORDER — METOPROLOL TARTRATE 5 MG/5ML
5 INJECTION INTRAVENOUS EVERY 6 HOURS
Status: DISCONTINUED | OUTPATIENT
Start: 2019-09-22 | End: 2019-09-22

## 2019-09-22 RX ORDER — QUETIAPINE FUMARATE 25 MG/1
12.5 TABLET, FILM COATED ORAL
Status: DISCONTINUED | OUTPATIENT
Start: 2019-09-22 | End: 2019-09-26 | Stop reason: HOSPADM

## 2019-09-22 RX ORDER — AMLODIPINE BESYLATE 5 MG/1
5 TABLET ORAL DAILY
Status: DISCONTINUED | OUTPATIENT
Start: 2019-09-22 | End: 2019-09-25

## 2019-09-22 RX ORDER — METOPROLOL TARTRATE 5 MG/5ML
5 INJECTION INTRAVENOUS EVERY 6 HOURS
Status: DISCONTINUED | OUTPATIENT
Start: 2019-09-22 | End: 2019-09-24

## 2019-09-22 RX ORDER — LORAZEPAM 2 MG/ML
1 INJECTION INTRAMUSCULAR ONCE
Status: COMPLETED | OUTPATIENT
Start: 2019-09-22 | End: 2019-09-22

## 2019-09-22 RX ORDER — PREDNISONE 20 MG/1
20 TABLET ORAL DAILY
Status: DISCONTINUED | OUTPATIENT
Start: 2019-09-23 | End: 2019-09-22

## 2019-09-22 RX ORDER — LABETALOL HYDROCHLORIDE 5 MG/ML
10 INJECTION, SOLUTION INTRAVENOUS EVERY 6 HOURS PRN
Status: DISCONTINUED | OUTPATIENT
Start: 2019-09-22 | End: 2019-09-26 | Stop reason: HOSPADM

## 2019-09-22 RX ADMIN — METOPROLOL TARTRATE 5 MG: 5 INJECTION, SOLUTION INTRAVENOUS at 16:17

## 2019-09-22 RX ADMIN — IPRATROPIUM BROMIDE AND ALBUTEROL SULFATE 1 AMPULE: .5; 3 SOLUTION RESPIRATORY (INHALATION) at 00:26

## 2019-09-22 RX ADMIN — IPRATROPIUM BROMIDE AND ALBUTEROL SULFATE 1 AMPULE: .5; 3 SOLUTION RESPIRATORY (INHALATION) at 23:15

## 2019-09-22 RX ADMIN — LABETALOL HYDROCHLORIDE 10 MG: 5 INJECTION INTRAVENOUS at 05:00

## 2019-09-22 RX ADMIN — SODIUM CHLORIDE: 9 INJECTION, SOLUTION INTRAVENOUS at 10:26

## 2019-09-22 RX ADMIN — LEVOFLOXACIN 500 MG: 5 INJECTION, SOLUTION INTRAVENOUS at 10:34

## 2019-09-22 RX ADMIN — LABETALOL HYDROCHLORIDE 10 MG: 5 INJECTION INTRAVENOUS at 12:26

## 2019-09-22 RX ADMIN — METHYLPREDNISOLONE SODIUM SUCCINATE 40 MG: 40 INJECTION, POWDER, FOR SOLUTION INTRAMUSCULAR; INTRAVENOUS at 07:37

## 2019-09-22 RX ADMIN — LORAZEPAM 1 MG: 2 INJECTION INTRAMUSCULAR at 13:51

## 2019-09-22 RX ADMIN — Medication 10 ML: at 20:55

## 2019-09-22 RX ADMIN — ENOXAPARIN SODIUM 40 MG: 40 INJECTION SUBCUTANEOUS at 07:40

## 2019-09-22 RX ADMIN — IPRATROPIUM BROMIDE AND ALBUTEROL SULFATE 1 AMPULE: .5; 3 SOLUTION RESPIRATORY (INHALATION) at 10:41

## 2019-09-22 RX ADMIN — SODIUM CHLORIDE: 9 INJECTION, SOLUTION INTRAVENOUS at 20:55

## 2019-09-22 RX ADMIN — METOPROLOL TARTRATE 5 MG: 5 INJECTION, SOLUTION INTRAVENOUS at 20:55

## 2019-09-22 RX ADMIN — IPRATROPIUM BROMIDE AND ALBUTEROL SULFATE 1 AMPULE: .5; 3 SOLUTION RESPIRATORY (INHALATION) at 20:01

## 2019-09-22 RX ADMIN — Medication 10 ML: at 07:38

## 2019-09-22 RX ADMIN — IPRATROPIUM BROMIDE AND ALBUTEROL SULFATE 1 AMPULE: .5; 3 SOLUTION RESPIRATORY (INHALATION) at 06:32

## 2019-09-22 RX ADMIN — IPRATROPIUM BROMIDE AND ALBUTEROL SULFATE 1 AMPULE: .5; 3 SOLUTION RESPIRATORY (INHALATION) at 15:46

## 2019-09-22 RX ADMIN — NITROGLYCERIN 1 INCH: 20 OINTMENT TOPICAL at 17:18

## 2019-09-22 RX ADMIN — NITROGLYCERIN 1 INCH: 20 OINTMENT TOPICAL at 13:52

## 2019-09-22 NOTE — PROGRESS NOTES
nonfocal    Psych: disoriented, rapid mostly unintelligible speech.          Labs:   Recent Labs     09/20/19  0721 09/21/19  0512   WBC 8.2 8.0   HGB 12.9 12.2   HCT 38.6 36.7    236     Recent Labs     09/20/19  0721 09/21/19  0512    138   K 3.8 3.8    103   CO2 25 25   BUN 19 20   CREATININE 0.7 0.6   CALCIUM 9.7 9.3     No results for input(s): AST, ALT, BILIDIR, BILITOT, ALKPHOS in the last 72 hours. No results for input(s): INR in the last 72 hours. Recent Labs     09/20/19 0721   TROPONINI <0.01       Urinalysis:      Lab Results   Component Value Date    NITRU Negative 09/20/2019    WBCUA 10-20 09/20/2019    BACTERIA 2+ 09/20/2019    RBCUA None seen 09/20/2019    BLOODU Negative 09/20/2019    SPECGRAV <=1.005 09/20/2019    GLUCOSEU Negative 09/20/2019       Radiology:  CT Chest Pulmonary Embolism W Contrast   Final Result   Motion limited study. No evidence for pulmonary embolism or right heart   strain. Bibasilar dependent atelectasis/scarring. Ground-glass opacities in the   lower lobes bilaterally. CT Head WO Contrast   Final Result   No acute intracranial abnormality. XR CHEST PORTABLE   Final Result   Chronic interstitial changes, similar to prior. Assessment/Plan:    Active Hospital Problems    Diagnosis    Acute respiratory failure with hypoxia ([facility] [address] [J96.01]    Urinary tract infection without hematuria [N39.0]    History of dementia [Z86.59]       Acute hypoxic respiratory failure  - presented with c/o shortness of breath, hypoxia. - admitted to med-surg.    - supplemental O2. Wean as tolerated. Currently on 2 L. - possibly due to pneumonia vs bronchitis  - CXR with chronic interstitial changes. - CTA no PE, bibasilar scarring         Bronchitis, with bronchospasm  - Levaquin D#3.   - solumedrol - to PO prednisone - 3 doses left starting tomorrow  - IBD  - IVFs. - Slp eval        UTI  - IVFs.  Levaquin as above  - urine cx

## 2019-09-23 LAB
ALBUMIN SERPL-MCNC: 3.1 G/DL (ref 3.4–5)
ANION GAP SERPL CALCULATED.3IONS-SCNC: 12 MMOL/L (ref 3–16)
BASOPHILS ABSOLUTE: 0 K/UL (ref 0–0.2)
BASOPHILS RELATIVE PERCENT: 0.1 %
BUN BLDV-MCNC: 29 MG/DL (ref 7–20)
CALCIUM SERPL-MCNC: 8.9 MG/DL (ref 8.3–10.6)
CHLORIDE BLD-SCNC: 109 MMOL/L (ref 99–110)
CO2: 19 MMOL/L (ref 21–32)
CREAT SERPL-MCNC: <0.5 MG/DL (ref 0.6–1.2)
EOSINOPHILS ABSOLUTE: 0 K/UL (ref 0–0.6)
EOSINOPHILS RELATIVE PERCENT: 0 %
GFR AFRICAN AMERICAN: >60
GFR NON-AFRICAN AMERICAN: >60
GLUCOSE BLD-MCNC: 109 MG/DL (ref 70–99)
GLUCOSE BLD-MCNC: 110 MG/DL (ref 70–99)
GLUCOSE BLD-MCNC: 111 MG/DL (ref 70–99)
GLUCOSE BLD-MCNC: 118 MG/DL (ref 70–99)
GLUCOSE BLD-MCNC: 56 MG/DL (ref 70–99)
GLUCOSE BLD-MCNC: 98 MG/DL (ref 70–99)
HCT VFR BLD CALC: 40.7 % (ref 36–48)
HEMOGLOBIN: 12.6 G/DL (ref 12–16)
LYMPHOCYTES ABSOLUTE: 1.1 K/UL (ref 1–5.1)
LYMPHOCYTES RELATIVE PERCENT: 15.8 %
MCH RBC QN AUTO: 30.6 PG (ref 26–34)
MCHC RBC AUTO-ENTMCNC: 30.9 G/DL (ref 31–36)
MCV RBC AUTO: 99 FL (ref 80–100)
MONOCYTES ABSOLUTE: 0.6 K/UL (ref 0–1.3)
MONOCYTES RELATIVE PERCENT: 9.4 %
NEUTROPHILS ABSOLUTE: 5.1 K/UL (ref 1.7–7.7)
NEUTROPHILS RELATIVE PERCENT: 74.7 %
PDW BLD-RTO: 14.2 % (ref 12.4–15.4)
PERFORMED ON: ABNORMAL
PERFORMED ON: NORMAL
PHOSPHORUS: 2.9 MG/DL (ref 2.5–4.9)
PLATELET # BLD: 213 K/UL (ref 135–450)
PMV BLD AUTO: 7.2 FL (ref 5–10.5)
POTASSIUM SERPL-SCNC: 3.9 MMOL/L (ref 3.5–5.1)
RBC # BLD: 4.11 M/UL (ref 4–5.2)
SODIUM BLD-SCNC: 140 MMOL/L (ref 136–145)
WBC # BLD: 6.9 K/UL (ref 4–11)

## 2019-09-23 PROCEDURE — 6370000000 HC RX 637 (ALT 250 FOR IP): Performed by: INTERNAL MEDICINE

## 2019-09-23 PROCEDURE — 99232 SBSQ HOSP IP/OBS MODERATE 35: CPT | Performed by: INTERNAL MEDICINE

## 2019-09-23 PROCEDURE — 85025 COMPLETE CBC W/AUTO DIFF WBC: CPT

## 2019-09-23 PROCEDURE — 94640 AIRWAY INHALATION TREATMENT: CPT

## 2019-09-23 PROCEDURE — 2580000003 HC RX 258: Performed by: PHYSICIAN ASSISTANT

## 2019-09-23 PROCEDURE — 2500000003 HC RX 250 WO HCPCS: Performed by: INTERNAL MEDICINE

## 2019-09-23 PROCEDURE — 92526 ORAL FUNCTION THERAPY: CPT

## 2019-09-23 PROCEDURE — 80069 RENAL FUNCTION PANEL: CPT

## 2019-09-23 PROCEDURE — 36415 COLL VENOUS BLD VENIPUNCTURE: CPT

## 2019-09-23 PROCEDURE — 2060000000 HC ICU INTERMEDIATE R&B

## 2019-09-23 PROCEDURE — 94761 N-INVAS EAR/PLS OXIMETRY MLT: CPT

## 2019-09-23 PROCEDURE — 6360000002 HC RX W HCPCS: Performed by: PHYSICIAN ASSISTANT

## 2019-09-23 PROCEDURE — 2580000003 HC RX 258: Performed by: INTERNAL MEDICINE

## 2019-09-23 RX ADMIN — IPRATROPIUM BROMIDE AND ALBUTEROL SULFATE 1 AMPULE: .5; 3 SOLUTION RESPIRATORY (INHALATION) at 11:49

## 2019-09-23 RX ADMIN — IPRATROPIUM BROMIDE AND ALBUTEROL SULFATE 1 AMPULE: .5; 3 SOLUTION RESPIRATORY (INHALATION) at 07:23

## 2019-09-23 RX ADMIN — IPRATROPIUM BROMIDE AND ALBUTEROL SULFATE 1 AMPULE: .5; 3 SOLUTION RESPIRATORY (INHALATION) at 15:44

## 2019-09-23 RX ADMIN — SODIUM CHLORIDE: 9 INJECTION, SOLUTION INTRAVENOUS at 23:56

## 2019-09-23 RX ADMIN — METOPROLOL TARTRATE 5 MG: 5 INJECTION, SOLUTION INTRAVENOUS at 09:58

## 2019-09-23 RX ADMIN — NITROGLYCERIN 1 INCH: 20 OINTMENT TOPICAL at 12:54

## 2019-09-23 RX ADMIN — SODIUM CHLORIDE: 9 INJECTION, SOLUTION INTRAVENOUS at 03:58

## 2019-09-23 RX ADMIN — ENOXAPARIN SODIUM 40 MG: 40 INJECTION SUBCUTANEOUS at 09:58

## 2019-09-23 RX ADMIN — NITROGLYCERIN 1 INCH: 20 OINTMENT TOPICAL at 19:00

## 2019-09-23 RX ADMIN — Medication 10 ML: at 22:01

## 2019-09-23 RX ADMIN — LABETALOL HYDROCHLORIDE 10 MG: 5 INJECTION INTRAVENOUS at 15:36

## 2019-09-23 RX ADMIN — IPRATROPIUM BROMIDE AND ALBUTEROL SULFATE 1 AMPULE: .5; 3 SOLUTION RESPIRATORY (INHALATION) at 23:20

## 2019-09-23 RX ADMIN — METOPROLOL TARTRATE 5 MG: 5 INJECTION, SOLUTION INTRAVENOUS at 04:03

## 2019-09-23 RX ADMIN — Medication 10 ML: at 09:58

## 2019-09-23 RX ADMIN — SODIUM CHLORIDE: 9 INJECTION, SOLUTION INTRAVENOUS at 15:59

## 2019-09-23 RX ADMIN — METOPROLOL TARTRATE 5 MG: 5 INJECTION, SOLUTION INTRAVENOUS at 22:01

## 2019-09-23 RX ADMIN — IPRATROPIUM BROMIDE AND ALBUTEROL SULFATE 1 AMPULE: .5; 3 SOLUTION RESPIRATORY (INHALATION) at 18:52

## 2019-09-23 RX ADMIN — SODIUM CHLORIDE: 9 INJECTION, SOLUTION INTRAVENOUS at 05:45

## 2019-09-23 RX ADMIN — NITROGLYCERIN 1 INCH: 20 OINTMENT TOPICAL at 00:03

## 2019-09-23 RX ADMIN — LEVOFLOXACIN 500 MG: 5 INJECTION, SOLUTION INTRAVENOUS at 11:44

## 2019-09-23 RX ADMIN — NITROGLYCERIN 1 INCH: 20 OINTMENT TOPICAL at 05:45

## 2019-09-23 ASSESSMENT — PAIN SCALES - PAIN ASSESSMENT IN ADVANCED DEMENTIA (PAINAD)
FACIALEXPRESSION: 0
BODYLANGUAGE: 0
CONSOLABILITY: 1
BREATHING: 0
BREATHING: 0
BODYLANGUAGE: 0
NEGVOCALIZATION: 1
FACIALEXPRESSION: 0
NEGVOCALIZATION: 1
TOTALSCORE: 2
CONSOLABILITY: 1
TOTALSCORE: 2

## 2019-09-23 ASSESSMENT — PAIN SCALES - GENERAL: PAINLEVEL_OUTOF10: 0

## 2019-09-23 NOTE — PROGRESS NOTES
RESPIRATORY THERAPY ASSESSMENT    Name:  [facility] Record Number:  [id number]  Age: 78 y.o. Gender: female  : 1940  Today's Date:  2019  Room:  /6190-02    Assessment     Is the patient being admitted for a COPD or Asthma exacerbation? No   (If yes the patient will be seen every 4 hours for the first 24 hours and then reassessed)    Patient Admission Diagnosis      Allergies  Allergies   Allergen Reactions    Sulfamethoxazole Anaphylaxis    Trimethoprim Anaphylaxis    Wellbutrin [Bupropion Hcl] Anaphylaxis    Advil [Ibuprofen Micronized]     Bactrim Swelling    Erythromycin     Guaifenesin Er        Minimum Predicted Vital Capacity:     na          Actual Vital Capacity:      na              Pulmonary History:No history  Home Oxygen Therapy:  room air  Home Respiratory Therapy:None   Current Respiratory Therapy:  duoneb q 4 w/a  Treatment Type: HHN  Medications: Albuterol/Ipratropium    Respiratory Severity Index(RSI)   Patients with orders for inhalation medications, oxygen, or any therapeutic treatment modality will be placed on Respiratory Protocol. They will be assessed with the first treatment and at least every 72 hours thereafter. The following severity scale will be used to determine frequency of treatment intervention. Smoking History: No Smoking History = 0    Social History  Social History     Tobacco Use    Smoking status: Never Smoker    Smokeless tobacco: Never Used   Substance Use Topics    Alcohol use: No    Drug use: No       Recent Surgical History: None = 0  Past Surgical History  Past Surgical History:   Procedure Laterality Date    CATARACT REMOVAL WITH IMPLANT Left 2014    CHOLECYSTECTOMY      COLON SURGERY      COLONOSCOPY  2015    diverticulosis    CORONARY ANGIOPLASTY      CYSTOSCOPY Right 2017    Cysto, retro, ureteroscopy.  Stone manipulation with out extraction right stent placement     FEMUR FRACTURE be administered with a bronchodilator. 2. Discontinue if patient experiences worsened bronchospasm, or secretions have lessened to the point that the patient is able to clear them with a cough. Anti-inflammatory and Combination Medications:    1. If the patient lacks prior history of lung disease, is not using inhaled anti-inflammatory medication at home, and lacks wheezing by examination or by history for at least 24 hours, contact physician for possible discontinuation.

## 2019-09-23 NOTE — CARE COORDINATION
Case Management Assessment  Initial Evaluation    Date/Time of Evaluation: 9/23/2019 12:35 PM  Assessment Completed by: Blanca Gaona    Patient Name: Sanaz Valadez  YOB: 1940  Diagnosis: Acute respiratory failure with hypoxia ([address] [J96.01]  Date / Time: 9/20/2019  6:49 AM  Admission status/Date:9/20/19 8838 inpt  Chart Reviewed: Yes      Patient Interviewed: No , uninterviewable  Family Interviewed:  Yes - CM attempted to leave a voicemail for Sharyle Labor (pt's [phone]), but VM not set up and he did not answer. Pt's daughter was at bedside, therefore CM only asked baseline questions about where pt is coming from. Hospitalization in the last 30 days:  No    Contacts  :     Relationship to Patient:   Phone Number:    Alternate Contact:     Relationship to Patient:     Phone Number:    Met with:    Current PCP Mae Gonzalez MD    Financial  Medicare  Precert required for SNF : Ting Garcia        3 night stay required - Y, N    ADLS  Support Systems:    Transportation: son, Sharyle Labor    Meal Preparation: son, [address] Environment: 2 story with son, Sharyle Labor  Steps: 4  Plans to Return to Present Housing: TBD  Other Identified Issues:     Delisa Garg  Currently active with [address] : Yes - , but daughter is unsure as to who it is. Passport/Waiver : No  :                      Phone Number:    Passport/Waiver Services:           Durable Medical Equipment   DME Provider:   Equipment: yes Walker_X_Cane__RTS__ BSC__Shower Chair__  02__ HHN__ CPAP__  BiPap__  Hospital Bed__ W/C_X__ Other__________      Has Home O2 in place on admit:  No  Informed of need to bring portable home O2 tank on day of discharge for nursing to connect prior to leaving:   Not Indicated  Verbalized agreement/Understanding:   Not Indicated    Community Service Affiliation  Dialysis:  No    · Name:  · Location  · Dialysis Schedule:  · Phone:   · Fax:     Outpatient PT/OT: No

## 2019-09-23 NOTE — FLOWSHEET NOTE
09/22/19 2019   Vital Signs   Temp 98.4 °F (36.9 °C)   Temp Source Oral   Pulse 96   Heart Rate Source Monitor   Resp 22   BP (!) 177/110   BP Location Right Arm   BP Upper/Lower Upper   MAP (mmHg) 133   Level of Consciousness 0   MEWS Score 2   Patient Currently in Pain Denies   Oxygen Therapy   SpO2 98 %   Pulse Oximeter Device Mode Intermittent   Pulse Oximeter Device Location Finger   O2 Device Nasal cannula   O2 Flow Rate (L/min) 1 L/min   Shift assessment complete, see flow sheets. Patient is disoriented x4,Pain assessed and appears to be in no distress. Vital signs stable, call light within reach, will continue to monitor.

## 2019-09-23 NOTE — PROGRESS NOTES
4 Eyes Skin Assessment     The patient is being assess for   Shift Handoff    I agree that 2 RN's have performed a thorough Head to Toe Skin Assessment on the patient. ALL assessment sites listed below have been assessed. Areas assessed by both nurses:   [x]   Head, Face, and Ears   [x]   Shoulders, Back, and Chest, Abdomen  [x]   Arms, Elbows, and Hands   [x]   Coccyx, Sacrum, and Ischium  [x]   Legs, Feet, and Heels        Blanchable redness to coccyx, foam dressing applied to coccyx, podus boots to bilateral feet. **SHARE this note so that the co-signing nurse is able to place an eSignature**    Co-signer eSignature: Electronically signed by Yobany Quintanilla RN on 9/22/19 at 7:40 PM    Does the Patient have Skin Breakdown?   No          Cullen Prevention initiated:  Yes   Wound Care Orders initiated:  No      WOC nurse consulted for Pressure Injury (Stage 3,4, Unstageable, DTI, NWPT, Complex wounds)and New or Established Ostomies:  No      Primary Nurse eSignature: Rudy Hart RN 9/22/19  7:37 PM

## 2019-09-24 ENCOUNTER — APPOINTMENT (OUTPATIENT)
Dept: GENERAL RADIOLOGY | Age: 79
DRG: 177 | End: 2019-09-24
Payer: MEDICARE

## 2019-09-24 LAB
ANION GAP SERPL CALCULATED.3IONS-SCNC: 15 MMOL/L (ref 3–16)
BASOPHILS ABSOLUTE: 0 K/UL (ref 0–0.2)
BASOPHILS RELATIVE PERCENT: 0.4 %
BUN BLDV-MCNC: 17 MG/DL (ref 7–20)
CALCIUM SERPL-MCNC: 9 MG/DL (ref 8.3–10.6)
CHLORIDE BLD-SCNC: 103 MMOL/L (ref 99–110)
CO2: 22 MMOL/L (ref 21–32)
CREAT SERPL-MCNC: <0.5 MG/DL (ref 0.6–1.2)
EOSINOPHILS ABSOLUTE: 0 K/UL (ref 0–0.6)
EOSINOPHILS RELATIVE PERCENT: 0 %
GFR AFRICAN AMERICAN: >60
GFR NON-AFRICAN AMERICAN: >60
GLUCOSE BLD-MCNC: 101 MG/DL (ref 70–99)
GLUCOSE BLD-MCNC: 101 MG/DL (ref 70–99)
GLUCOSE BLD-MCNC: 117 MG/DL (ref 70–99)
GLUCOSE BLD-MCNC: 92 MG/DL (ref 70–99)
GLUCOSE BLD-MCNC: 97 MG/DL (ref 70–99)
HCT VFR BLD CALC: 38.5 % (ref 36–48)
HEMOGLOBIN: 12.9 G/DL (ref 12–16)
LYMPHOCYTES ABSOLUTE: 1.1 K/UL (ref 1–5.1)
LYMPHOCYTES RELATIVE PERCENT: 11.4 %
MAGNESIUM: 1.7 MG/DL (ref 1.8–2.4)
MCH RBC QN AUTO: 30.2 PG (ref 26–34)
MCHC RBC AUTO-ENTMCNC: 33.5 G/DL (ref 31–36)
MCV RBC AUTO: 90.1 FL (ref 80–100)
MONOCYTES ABSOLUTE: 0.5 K/UL (ref 0–1.3)
MONOCYTES RELATIVE PERCENT: 5.5 %
NEUTROPHILS ABSOLUTE: 7.8 K/UL (ref 1.7–7.7)
NEUTROPHILS RELATIVE PERCENT: 82.7 %
PDW BLD-RTO: 13.7 % (ref 12.4–15.4)
PERFORMED ON: ABNORMAL
PERFORMED ON: ABNORMAL
PERFORMED ON: NORMAL
PERFORMED ON: NORMAL
PLATELET # BLD: 238 K/UL (ref 135–450)
PMV BLD AUTO: 6.5 FL (ref 5–10.5)
POTASSIUM REFLEX MAGNESIUM: 3.3 MMOL/L (ref 3.5–5.1)
RBC # BLD: 4.28 M/UL (ref 4–5.2)
SODIUM BLD-SCNC: 140 MMOL/L (ref 136–145)
WBC # BLD: 9.5 K/UL (ref 4–11)

## 2019-09-24 PROCEDURE — 2500000003 HC RX 250 WO HCPCS: Performed by: PHYSICIAN ASSISTANT

## 2019-09-24 PROCEDURE — 6370000000 HC RX 637 (ALT 250 FOR IP): Performed by: INTERNAL MEDICINE

## 2019-09-24 PROCEDURE — 36415 COLL VENOUS BLD VENIPUNCTURE: CPT

## 2019-09-24 PROCEDURE — 2700000000 HC OXYGEN THERAPY PER DAY

## 2019-09-24 PROCEDURE — 83735 ASSAY OF MAGNESIUM: CPT

## 2019-09-24 PROCEDURE — 92526 ORAL FUNCTION THERAPY: CPT

## 2019-09-24 PROCEDURE — 94640 AIRWAY INHALATION TREATMENT: CPT

## 2019-09-24 PROCEDURE — 6360000002 HC RX W HCPCS: Performed by: PHYSICIAN ASSISTANT

## 2019-09-24 PROCEDURE — 2500000003 HC RX 250 WO HCPCS: Performed by: INTERNAL MEDICINE

## 2019-09-24 PROCEDURE — 80048 BASIC METABOLIC PNL TOTAL CA: CPT

## 2019-09-24 PROCEDURE — 85025 COMPLETE CBC W/AUTO DIFF WBC: CPT

## 2019-09-24 PROCEDURE — 99233 SBSQ HOSP IP/OBS HIGH 50: CPT | Performed by: PHYSICIAN ASSISTANT

## 2019-09-24 PROCEDURE — 2580000003 HC RX 258: Performed by: PHYSICIAN ASSISTANT

## 2019-09-24 PROCEDURE — 2060000000 HC ICU INTERMEDIATE R&B

## 2019-09-24 PROCEDURE — 2580000003 HC RX 258: Performed by: INTERNAL MEDICINE

## 2019-09-24 PROCEDURE — 94761 N-INVAS EAR/PLS OXIMETRY MLT: CPT

## 2019-09-24 RX ORDER — LIDOCAINE HYDROCHLORIDE 20 MG/ML
JELLY TOPICAL
Status: DISCONTINUED
Start: 2019-09-24 | End: 2019-09-25

## 2019-09-24 RX ORDER — METOPROLOL TARTRATE 5 MG/5ML
5 INJECTION INTRAVENOUS ONCE
Status: COMPLETED | OUTPATIENT
Start: 2019-09-24 | End: 2019-09-24

## 2019-09-24 RX ORDER — LORAZEPAM 2 MG/ML
0.5 INJECTION INTRAMUSCULAR 2 TIMES DAILY PRN
Status: DISCONTINUED | OUTPATIENT
Start: 2019-09-24 | End: 2019-09-26 | Stop reason: HOSPADM

## 2019-09-24 RX ORDER — LABETALOL HYDROCHLORIDE 5 MG/ML
5 INJECTION, SOLUTION INTRAVENOUS ONCE
Status: DISCONTINUED | OUTPATIENT
Start: 2019-09-24 | End: 2019-09-24

## 2019-09-24 RX ORDER — METOPROLOL TARTRATE 5 MG/5ML
10 INJECTION INTRAVENOUS EVERY 6 HOURS
Status: DISCONTINUED | OUTPATIENT
Start: 2019-09-24 | End: 2019-09-26 | Stop reason: HOSPADM

## 2019-09-24 RX ADMIN — LABETALOL HYDROCHLORIDE 10 MG: 5 INJECTION INTRAVENOUS at 07:42

## 2019-09-24 RX ADMIN — IPRATROPIUM BROMIDE AND ALBUTEROL SULFATE 1 AMPULE: .5; 3 SOLUTION RESPIRATORY (INHALATION) at 11:53

## 2019-09-24 RX ADMIN — METOPROLOL TARTRATE 10 MG: 5 INJECTION, SOLUTION INTRAVENOUS at 21:46

## 2019-09-24 RX ADMIN — IPRATROPIUM BROMIDE AND ALBUTEROL SULFATE 1 AMPULE: .5; 3 SOLUTION RESPIRATORY (INHALATION) at 23:05

## 2019-09-24 RX ADMIN — Medication 10 ML: at 21:49

## 2019-09-24 RX ADMIN — NITROGLYCERIN 1 INCH: 20 OINTMENT TOPICAL at 00:31

## 2019-09-24 RX ADMIN — ENOXAPARIN SODIUM 40 MG: 40 INJECTION SUBCUTANEOUS at 09:30

## 2019-09-24 RX ADMIN — Medication 10 ML: at 09:30

## 2019-09-24 RX ADMIN — METOPROLOL TARTRATE 5 MG: 5 INJECTION, SOLUTION INTRAVENOUS at 11:32

## 2019-09-24 RX ADMIN — LEVOFLOXACIN 500 MG: 5 INJECTION, SOLUTION INTRAVENOUS at 11:15

## 2019-09-24 RX ADMIN — LABETALOL HYDROCHLORIDE 10 MG: 5 INJECTION INTRAVENOUS at 01:11

## 2019-09-24 RX ADMIN — NITROGLYCERIN 1 INCH: 20 OINTMENT TOPICAL at 11:32

## 2019-09-24 RX ADMIN — LORAZEPAM 0.5 MG: 2 INJECTION INTRAMUSCULAR at 23:08

## 2019-09-24 RX ADMIN — METOPROLOL TARTRATE 5 MG: 5 INJECTION, SOLUTION INTRAVENOUS at 09:30

## 2019-09-24 RX ADMIN — SODIUM CHLORIDE: 9 INJECTION, SOLUTION INTRAVENOUS at 08:56

## 2019-09-24 RX ADMIN — IPRATROPIUM BROMIDE AND ALBUTEROL SULFATE 1 AMPULE: .5; 3 SOLUTION RESPIRATORY (INHALATION) at 07:33

## 2019-09-24 RX ADMIN — NITROGLYCERIN 1 INCH: 20 OINTMENT TOPICAL at 23:54

## 2019-09-24 RX ADMIN — METOPROLOL TARTRATE 10 MG: 5 INJECTION, SOLUTION INTRAVENOUS at 16:06

## 2019-09-24 RX ADMIN — LORAZEPAM 0.5 MG: 2 INJECTION INTRAMUSCULAR at 17:39

## 2019-09-24 RX ADMIN — METOPROLOL TARTRATE 5 MG: 5 INJECTION, SOLUTION INTRAVENOUS at 03:26

## 2019-09-24 RX ADMIN — IPRATROPIUM BROMIDE AND ALBUTEROL SULFATE 1 AMPULE: .5; 3 SOLUTION RESPIRATORY (INHALATION) at 19:25

## 2019-09-24 RX ADMIN — SODIUM CHLORIDE: 9 INJECTION, SOLUTION INTRAVENOUS at 17:44

## 2019-09-24 RX ADMIN — NITROGLYCERIN 1 INCH: 20 OINTMENT TOPICAL at 17:41

## 2019-09-24 RX ADMIN — NITROGLYCERIN 1 INCH: 20 OINTMENT TOPICAL at 06:36

## 2019-09-24 ASSESSMENT — PAIN SCALES - PAIN ASSESSMENT IN ADVANCED DEMENTIA (PAINAD)
CONSOLABILITY: 1
FACIALEXPRESSION: 0
BREATHING: 0
NEGVOCALIZATION: 1
TOTALSCORE: 2
TOTALSCORE: 2
NEGVOCALIZATION: 1
BODYLANGUAGE: 0
TOTALSCORE: 2
NEGVOCALIZATION: 1
BREATHING: 0
FACIALEXPRESSION: 0
CONSOLABILITY: 1
FACIALEXPRESSION: 0
BODYLANGUAGE: 0
TOTALSCORE: 2
BODYLANGUAGE: 0
BREATHING: 0
NEGVOCALIZATION: 1
BODYLANGUAGE: 0
CONSOLABILITY: 1
CONSOLABILITY: 1
BREATHING: 0
TOTALSCORE: 2
CONSOLABILITY: 1
FACIALEXPRESSION: 0
CONSOLABILITY: 1
TOTALSCORE: 2
FACIALEXPRESSION: 0
BREATHING: 0
BREATHING: 0
BODYLANGUAGE: 0
BODYLANGUAGE: 0
NEGVOCALIZATION: 1
NEGVOCALIZATION: 1
FACIALEXPRESSION: 0

## 2019-09-24 NOTE — PROGRESS NOTES
commands, only mumbles to respond, unintelligible  Psych: disoriented, rapid mostly unintelligible speech.          Labs:   Recent Labs     09/23/19  0418   WBC 6.9   HGB 12.6   HCT 40.7        Recent Labs     09/23/19  0418      K 3.9      CO2 19*   BUN 29*   CREATININE <0.5*   CALCIUM 8.9   PHOS 2.9       Radiology:  CT Chest Pulmonary Embolism W Contrast   Final Result   Motion limited study. No evidence for pulmonary embolism or right heart   strain. Bibasilar dependent atelectasis/scarring. Ground-glass opacities in the   lower lobes bilaterally. CT Head WO Contrast   Final Result   No acute intracranial abnormality. XR CHEST PORTABLE   Final Result   Chronic interstitial changes, similar to prior. Tele - sinus tach       Assessment/Plan:  Acute hypoxic respiratory failure--resolved  - presented with c/o shortness of breath, hypoxia. - admitted to med-surg.    - supplemental O2. Wean as tolerated. Currently on 2 L- > 1 L--> now on RA  - possibly due to pneumonia vs bronchitis  - CXR with chronic interstitial changes. - CTA no PE, bibasilar scarring       Possible aspiration pneumonia   bronchitis, with bronchospasm  - NPO, speech therapy evaluation--> patient was not taking PO, continued recommendations for NPO   -Continue Levaquin D#5   -Switched Solumedrol - to PO prednisone, not taking PO, will await NGT placement, may need to change back to IV  -Continue IBD  -Continue IVFs.    UTI  - IVFs. Levaquin as above  - urine cx negative    Debility  - PT/OT. Likely needs SNF placement    Dementia with hx of bipolar disorder  - stop zyprexa,   - cont prozac  - add seroquel 12.5 am and 25 nightly  -She remains disoriented. - CT head negative on admission  - Continues to have decline in mental status, geriatric psychiatry consult      CAD  - s/p angioplasty.    - on aspirin, statin, BB.      Hypertension  -BP high.   - On  Amlodipine, Toprol-XL- >  Not given

## 2019-09-24 NOTE — PROGRESS NOTES
4 Eyes Skin Assessment     The patient is being assess for   Shift Handoff    I agree that 2 RN's have performed a thorough Head to Toe Skin Assessment on the patient. ALL assessment sites listed below have been assessed. Areas assessed by both nurses:   [x]   Head, Face, and Ears   [x]   Shoulders, Back, and Chest, Abdomen  [x]   Arms, Elbows, and Hands   [x]   Coccyx, Sacrum, and Ischium  [x]   Legs, Feet, and Heels   Blanchable redness to coccyx and sacrum. **SHARE this note so that the co-signing nurse is able to place an eSignature**    Co-signer eSignature: Electronically signed by Collin Zamudio RN on 9/24/19 at 7:40 AM    Does the Patient have Skin Breakdown?   No          Cullen Prevention initiated:  Yes   Wound Care Orders initiated:  No      [facility] nurse consulted for Pressure Injury (Stage 3,4, Unstageable, DTI, NWPT, Complex wounds)and New or Established Ostomies:  No      Primary Nurse eSignature: Electronically signed by Zoran Liu RN on 9/24/19 at 9:56 AM

## 2019-09-24 NOTE — PROGRESS NOTES
Psych consult called to [facility] in geriatric psych. Ethel Barnard will be seeing tomorrow.

## 2019-09-24 NOTE — PROCEDURES
Radiology Procedure Note    Patient Name: Jackson Luke  Date of Birth 1940  MRN: [id number]    Procedure: Fluoroscopy assisted NGT placement    Pre-Procedure Diagnosis: Dementia requiring nutrition and medication     Post- Procedure Diagnosis: Same    Findings: The procedure was performed in the usual fashion, detailed in the full report provided separately. Patient arrived in radiology with mild, dried blood in both nares from, by report, 7 prior NGT placement attempts. Extreme difficulty passing the feeding tube provided beyond the nasopharynx. Advancement into the trachea was demonstrated on two attempts, rapidly withdrawn. The tube could not be passed into the esophagus or stomach. A Dobbhoff tube was also unsuccessful. Complications: Recurrent epistaxis from her nares posteriorly into her oropharynx due to positioning. Suction used to clean out her oropharynx.     Estimated Blood Loss: less than 50     Specimens: Was Not Obtained      Electronically signed by Jaylon Cruz MD on 9/24/2019 at 7:54 PM

## 2019-09-25 ENCOUNTER — APPOINTMENT (OUTPATIENT)
Dept: GENERAL RADIOLOGY | Age: 79
DRG: 177 | End: 2019-09-25
Payer: MEDICARE

## 2019-09-25 LAB
ANION GAP SERPL CALCULATED.3IONS-SCNC: 16 MMOL/L (ref 3–16)
BASOPHILS ABSOLUTE: 0 K/UL (ref 0–0.2)
BASOPHILS RELATIVE PERCENT: 0.4 %
BLOOD CULTURE, ROUTINE: NORMAL
BUN BLDV-MCNC: 17 MG/DL (ref 7–20)
CALCIUM SERPL-MCNC: 8.7 MG/DL (ref 8.3–10.6)
CHLORIDE BLD-SCNC: 103 MMOL/L (ref 99–110)
CO2: 19 MMOL/L (ref 21–32)
CREAT SERPL-MCNC: <0.5 MG/DL (ref 0.6–1.2)
CULTURE, BLOOD 2: NORMAL
EOSINOPHILS ABSOLUTE: 0 K/UL (ref 0–0.6)
EOSINOPHILS RELATIVE PERCENT: 0 %
GFR AFRICAN AMERICAN: >60
GFR NON-AFRICAN AMERICAN: >60
GLUCOSE BLD-MCNC: 114 MG/DL (ref 70–99)
GLUCOSE BLD-MCNC: 115 MG/DL (ref 70–99)
GLUCOSE BLD-MCNC: 89 MG/DL (ref 70–99)
GLUCOSE BLD-MCNC: 93 MG/DL (ref 70–99)
GLUCOSE BLD-MCNC: 96 MG/DL (ref 70–99)
HCT VFR BLD CALC: 40.2 % (ref 36–48)
HEMOGLOBIN: 13.4 G/DL (ref 12–16)
LYMPHOCYTES ABSOLUTE: 1.2 K/UL (ref 1–5.1)
LYMPHOCYTES RELATIVE PERCENT: 12.9 %
MAGNESIUM: 1.9 MG/DL (ref 1.8–2.4)
MCH RBC QN AUTO: 30.5 PG (ref 26–34)
MCHC RBC AUTO-ENTMCNC: 33.3 G/DL (ref 31–36)
MCV RBC AUTO: 91.6 FL (ref 80–100)
MONOCYTES ABSOLUTE: 0.6 K/UL (ref 0–1.3)
MONOCYTES RELATIVE PERCENT: 6.6 %
NEUTROPHILS ABSOLUTE: 7.2 K/UL (ref 1.7–7.7)
NEUTROPHILS RELATIVE PERCENT: 80.1 %
PDW BLD-RTO: 13.5 % (ref 12.4–15.4)
PERFORMED ON: ABNORMAL
PERFORMED ON: ABNORMAL
PERFORMED ON: NORMAL
PERFORMED ON: NORMAL
PLATELET # BLD: 146 K/UL (ref 135–450)
PMV BLD AUTO: 7.9 FL (ref 5–10.5)
POTASSIUM REFLEX MAGNESIUM: 3.3 MMOL/L (ref 3.5–5.1)
POTASSIUM SERPL-SCNC: 3.5 MMOL/L (ref 3.5–5.1)
RBC # BLD: 4.39 M/UL (ref 4–5.2)
SODIUM BLD-SCNC: 138 MMOL/L (ref 136–145)
WBC # BLD: 9 K/UL (ref 4–11)

## 2019-09-25 PROCEDURE — 99222 1ST HOSP IP/OBS MODERATE 55: CPT | Performed by: PSYCHIATRY & NEUROLOGY

## 2019-09-25 PROCEDURE — 36415 COLL VENOUS BLD VENIPUNCTURE: CPT

## 2019-09-25 PROCEDURE — 2580000003 HC RX 258: Performed by: INTERNAL MEDICINE

## 2019-09-25 PROCEDURE — 2060000000 HC ICU INTERMEDIATE R&B

## 2019-09-25 PROCEDURE — 83735 ASSAY OF MAGNESIUM: CPT

## 2019-09-25 PROCEDURE — 6370000000 HC RX 637 (ALT 250 FOR IP): Performed by: HOSPITALIST

## 2019-09-25 PROCEDURE — 6370000000 HC RX 637 (ALT 250 FOR IP): Performed by: INTERNAL MEDICINE

## 2019-09-25 PROCEDURE — 92526 ORAL FUNCTION THERAPY: CPT

## 2019-09-25 PROCEDURE — 2500000003 HC RX 250 WO HCPCS: Performed by: PHYSICIAN ASSISTANT

## 2019-09-25 PROCEDURE — 6360000002 HC RX W HCPCS: Performed by: INTERNAL MEDICINE

## 2019-09-25 PROCEDURE — 94761 N-INVAS EAR/PLS OXIMETRY MLT: CPT

## 2019-09-25 PROCEDURE — 2580000003 HC RX 258: Performed by: PHYSICIAN ASSISTANT

## 2019-09-25 PROCEDURE — 6360000002 HC RX W HCPCS: Performed by: HOSPITALIST

## 2019-09-25 PROCEDURE — C9113 INJ PANTOPRAZOLE SODIUM, VIA: HCPCS | Performed by: INTERNAL MEDICINE

## 2019-09-25 PROCEDURE — 94640 AIRWAY INHALATION TREATMENT: CPT

## 2019-09-25 PROCEDURE — 6360000002 HC RX W HCPCS: Performed by: PHYSICIAN ASSISTANT

## 2019-09-25 PROCEDURE — 2500000003 HC RX 250 WO HCPCS: Performed by: INTERNAL MEDICINE

## 2019-09-25 PROCEDURE — 85025 COMPLETE CBC W/AUTO DIFF WBC: CPT

## 2019-09-25 PROCEDURE — 99233 SBSQ HOSP IP/OBS HIGH 50: CPT | Performed by: INTERNAL MEDICINE

## 2019-09-25 PROCEDURE — 80048 BASIC METABOLIC PNL TOTAL CA: CPT

## 2019-09-25 PROCEDURE — 2709999900 IR PLACE NG TUBE BY DR W FLUORO

## 2019-09-25 PROCEDURE — 84132 ASSAY OF SERUM POTASSIUM: CPT

## 2019-09-25 RX ORDER — FUROSEMIDE 10 MG/ML
20 INJECTION INTRAMUSCULAR; INTRAVENOUS ONCE
Status: COMPLETED | OUTPATIENT
Start: 2019-09-25 | End: 2019-09-25

## 2019-09-25 RX ORDER — OXYMETAZOLINE HYDROCHLORIDE 0.05 G/100ML
2 SPRAY NASAL 2 TIMES DAILY PRN
Status: DISCONTINUED | OUTPATIENT
Start: 2019-09-25 | End: 2019-09-26 | Stop reason: HOSPADM

## 2019-09-25 RX ORDER — POTASSIUM CHLORIDE 7.45 MG/ML
10 INJECTION INTRAVENOUS PRN
Status: DISCONTINUED | OUTPATIENT
Start: 2019-09-25 | End: 2019-09-26 | Stop reason: HOSPADM

## 2019-09-25 RX ORDER — DEXTROSE, SODIUM CHLORIDE, AND POTASSIUM CHLORIDE 5; .45; .3 G/100ML; G/100ML; G/100ML
INJECTION INTRAVENOUS CONTINUOUS
Status: DISCONTINUED | OUTPATIENT
Start: 2019-09-25 | End: 2019-09-26

## 2019-09-25 RX ORDER — MAGNESIUM SULFATE 1 G/100ML
1 INJECTION INTRAVENOUS PRN
Status: DISCONTINUED | OUTPATIENT
Start: 2019-09-25 | End: 2019-09-26 | Stop reason: HOSPADM

## 2019-09-25 RX ORDER — HYDRALAZINE HYDROCHLORIDE 20 MG/ML
10 INJECTION INTRAMUSCULAR; INTRAVENOUS EVERY 6 HOURS PRN
Status: DISCONTINUED | OUTPATIENT
Start: 2019-09-25 | End: 2019-09-26 | Stop reason: HOSPADM

## 2019-09-25 RX ORDER — CLONIDINE 0.2 MG/24H
1 PATCH, EXTENDED RELEASE TRANSDERMAL WEEKLY
Status: DISCONTINUED | OUTPATIENT
Start: 2019-09-25 | End: 2019-09-26 | Stop reason: HOSPADM

## 2019-09-25 RX ORDER — EPINEPHRINE NASAL SOLUTION 1 MG/ML
1 SOLUTION NASAL ONCE
Status: DISCONTINUED | OUTPATIENT
Start: 2019-09-25 | End: 2019-09-25

## 2019-09-25 RX ORDER — PANTOPRAZOLE SODIUM 40 MG/10ML
40 INJECTION, POWDER, LYOPHILIZED, FOR SOLUTION INTRAVENOUS DAILY
Status: DISCONTINUED | OUTPATIENT
Start: 2019-09-25 | End: 2019-09-26 | Stop reason: HOSPADM

## 2019-09-25 RX ADMIN — SODIUM CHLORIDE: 9 INJECTION, SOLUTION INTRAVENOUS at 02:01

## 2019-09-25 RX ADMIN — IPRATROPIUM BROMIDE AND ALBUTEROL SULFATE 1 AMPULE: .5; 3 SOLUTION RESPIRATORY (INHALATION) at 23:22

## 2019-09-25 RX ADMIN — Medication 2 SPRAY: at 20:58

## 2019-09-25 RX ADMIN — NITROGLYCERIN 1 INCH: 20 OINTMENT TOPICAL at 06:06

## 2019-09-25 RX ADMIN — POTASSIUM CHLORIDE 10 MEQ: 7.46 INJECTION, SOLUTION INTRAVENOUS at 09:43

## 2019-09-25 RX ADMIN — LABETALOL HYDROCHLORIDE 10 MG: 5 INJECTION INTRAVENOUS at 00:25

## 2019-09-25 RX ADMIN — ENOXAPARIN SODIUM 40 MG: 40 INJECTION SUBCUTANEOUS at 09:42

## 2019-09-25 RX ADMIN — POTASSIUM CHLORIDE 10 MEQ: 7.46 INJECTION, SOLUTION INTRAVENOUS at 12:02

## 2019-09-25 RX ADMIN — POTASSIUM CHLORIDE 10 MEQ: 7.46 INJECTION, SOLUTION INTRAVENOUS at 13:48

## 2019-09-25 RX ADMIN — POTASSIUM CHLORIDE, DEXTROSE MONOHYDRATE AND SODIUM CHLORIDE: 300; 5; 450 INJECTION, SOLUTION INTRAVENOUS at 23:26

## 2019-09-25 RX ADMIN — PANTOPRAZOLE SODIUM 40 MG: 40 INJECTION, POWDER, FOR SOLUTION INTRAVENOUS at 11:31

## 2019-09-25 RX ADMIN — POTASSIUM CHLORIDE 10 MEQ: 7.46 INJECTION, SOLUTION INTRAVENOUS at 10:36

## 2019-09-25 RX ADMIN — NITROGLYCERIN 1 INCH: 20 OINTMENT TOPICAL at 18:40

## 2019-09-25 RX ADMIN — FUROSEMIDE 20 MG: 10 INJECTION, SOLUTION INTRAMUSCULAR; INTRAVENOUS at 11:30

## 2019-09-25 RX ADMIN — IPRATROPIUM BROMIDE AND ALBUTEROL SULFATE 1 AMPULE: .5; 3 SOLUTION RESPIRATORY (INHALATION) at 07:24

## 2019-09-25 RX ADMIN — POTASSIUM CHLORIDE, DEXTROSE MONOHYDRATE AND SODIUM CHLORIDE: 300; 5; 450 INJECTION, SOLUTION INTRAVENOUS at 12:03

## 2019-09-25 RX ADMIN — METOPROLOL TARTRATE 10 MG: 5 INJECTION, SOLUTION INTRAVENOUS at 10:15

## 2019-09-25 RX ADMIN — Medication 10 ML: at 20:58

## 2019-09-25 RX ADMIN — IPRATROPIUM BROMIDE AND ALBUTEROL SULFATE 1 AMPULE: .5; 3 SOLUTION RESPIRATORY (INHALATION) at 15:08

## 2019-09-25 RX ADMIN — LABETALOL HYDROCHLORIDE 10 MG: 5 INJECTION INTRAVENOUS at 15:25

## 2019-09-25 RX ADMIN — METOPROLOL TARTRATE 10 MG: 5 INJECTION, SOLUTION INTRAVENOUS at 03:28

## 2019-09-25 RX ADMIN — LORAZEPAM 0.5 MG: 2 INJECTION INTRAMUSCULAR at 23:12

## 2019-09-25 RX ADMIN — HYDRALAZINE HYDROCHLORIDE 10 MG: 20 INJECTION INTRAMUSCULAR; INTRAVENOUS at 18:57

## 2019-09-25 RX ADMIN — HYDRALAZINE HYDROCHLORIDE 10 MG: 20 INJECTION INTRAMUSCULAR; INTRAVENOUS at 23:13

## 2019-09-25 RX ADMIN — NITROGLYCERIN 1 INCH: 20 OINTMENT TOPICAL at 13:47

## 2019-09-25 RX ADMIN — METOPROLOL TARTRATE 10 MG: 5 INJECTION, SOLUTION INTRAVENOUS at 20:58

## 2019-09-25 RX ADMIN — Medication 2 SPRAY: at 04:11

## 2019-09-25 RX ADMIN — METOPROLOL TARTRATE 10 MG: 5 INJECTION, SOLUTION INTRAVENOUS at 17:27

## 2019-09-25 RX ADMIN — IPRATROPIUM BROMIDE AND ALBUTEROL SULFATE 1 AMPULE: .5; 3 SOLUTION RESPIRATORY (INHALATION) at 20:01

## 2019-09-25 RX ADMIN — NITROGLYCERIN 1 INCH: 20 OINTMENT TOPICAL at 23:26

## 2019-09-25 RX ADMIN — IPRATROPIUM BROMIDE AND ALBUTEROL SULFATE 1 AMPULE: .5; 3 SOLUTION RESPIRATORY (INHALATION) at 11:14

## 2019-09-25 ASSESSMENT — PAIN SCALES - PAIN ASSESSMENT IN ADVANCED DEMENTIA (PAINAD)
FACIALEXPRESSION: 0
CONSOLABILITY: 1
NEGVOCALIZATION: 1
TOTALSCORE: 2
FACIALEXPRESSION: 0
BREATHING: 0
CONSOLABILITY: 1
FACIALEXPRESSION: 0
TOTALSCORE: 2
FACIALEXPRESSION: 0
BREATHING: 0
BODYLANGUAGE: 0
TOTALSCORE: 2
BREATHING: 0
BODYLANGUAGE: 0
CONSOLABILITY: 1
CONSOLABILITY: 1
NEGVOCALIZATION: 1
FACIALEXPRESSION: 0
BREATHING: 0
FACIALEXPRESSION: 0
TOTALSCORE: 0
BODYLANGUAGE: 0
NEGVOCALIZATION: 1
BODYLANGUAGE: 0
NEGVOCALIZATION: 1
CONSOLABILITY: 1
NEGVOCALIZATION: 1
CONSOLABILITY: 0
CONSOLABILITY: 1
BODYLANGUAGE: 0
BODYLANGUAGE: 0
TOTALSCORE: 2
NEGVOCALIZATION: 0
FACIALEXPRESSION: 0
BODYLANGUAGE: 0
BREATHING: 0
TOTALSCORE: 2
TOTALSCORE: 2
BREATHING: 0
CONSOLABILITY: 1
FACIALEXPRESSION: 0
BODYLANGUAGE: 0
TOTALSCORE: 2
NEGVOCALIZATION: 1
NEGVOCALIZATION: 1

## 2019-09-25 ASSESSMENT — PAIN SCALES - GENERAL
PAINLEVEL_OUTOF10: 0
PAINLEVEL_OUTOF10: 0

## 2019-09-25 NOTE — PROGRESS NOTES
Continued Dysphagia treatment with goals per plan of care. Discharge Recommendations: TBD      Total Treatment Time / Charges     Time in Time out Total Time / units   Cognitive Tx         Speech Tx      Dysphagia Tx 0955 0910 15 minutes / 1 unit     Signature:  Bonnie [id number]  Speech-Language Pathologist    9/25/2019      If patient discharges prior to next visit, this note to serve as the discharge summary.

## 2019-09-25 NOTE — PROGRESS NOTES
Pt. BP still elevated 204/114, Pt currently moaning, PRN ativan given per [facility] will continue to monitor.

## 2019-09-25 NOTE — CONSULTS
condition appears to have advanced to end stage dementia. Palliative care referral would be appropriate. Spent > 80 minutes face to face with patient of which >50% was spent counseling and providing education regarding diagnosis, treatment options, and prognosis. Thank you for consult. Please do not hesitate to contact provider if there are additional questions regarding patient.     Ame Willis MD  Staff Psychiatrist

## 2019-09-25 NOTE — FLOWSHEET NOTE
09/24/19 2143   Vitals   Temp 98.7 °F (37.1 °C)   Temp Source Axillary   Pulse 86   Heart Rate Source Monitor   Resp 18   BP (!) 191/111   BP Location Left Arm   BP Upper/Lower Upper   BP Method Automatic   Patient Position Semi fowlers   Level of Consciousness 0   MEWS Score 1   Patient Currently in Pain Denies   Oxygen Therapy   SpO2 97 %   O2 Device None (Room air)   Shift assessment completed. Pt in bed awake. Non-verbal. Suctioned mouth, bloody sputum noted. Bottom right lip busted. Vitals obtained. /111. IV metoprolol given per order. Pt. 97% on RA. Pt. Repositioned. No distress noted,will continue to monitor,call light within reach.

## 2019-09-26 VITALS
DIASTOLIC BLOOD PRESSURE: 104 MMHG | HEIGHT: 61 IN | OXYGEN SATURATION: 96 % | BODY MASS INDEX: 27.03 KG/M2 | RESPIRATION RATE: 26 BRPM | WEIGHT: 143.2 LBS | SYSTOLIC BLOOD PRESSURE: 183 MMHG | HEART RATE: 86 BPM | TEMPERATURE: 98.5 F

## 2019-09-26 LAB
ANION GAP SERPL CALCULATED.3IONS-SCNC: 13 MMOL/L (ref 3–16)
BUN BLDV-MCNC: 14 MG/DL (ref 7–20)
CALCIUM SERPL-MCNC: 9.1 MG/DL (ref 8.3–10.6)
CHLORIDE BLD-SCNC: 100 MMOL/L (ref 99–110)
CO2: 23 MMOL/L (ref 21–32)
CREAT SERPL-MCNC: <0.5 MG/DL (ref 0.6–1.2)
GFR AFRICAN AMERICAN: >60
GFR NON-AFRICAN AMERICAN: >60
GLUCOSE BLD-MCNC: 135 MG/DL (ref 70–99)
GLUCOSE BLD-MCNC: 138 MG/DL (ref 70–99)
GLUCOSE BLD-MCNC: 158 MG/DL (ref 70–99)
MAGNESIUM: 1.8 MG/DL (ref 1.8–2.4)
PERFORMED ON: ABNORMAL
PERFORMED ON: ABNORMAL
POTASSIUM REFLEX MAGNESIUM: 3.8 MMOL/L (ref 3.5–5.1)
SODIUM BLD-SCNC: 136 MMOL/L (ref 136–145)

## 2019-09-26 PROCEDURE — 99238 HOSP IP/OBS DSCHRG MGMT 30/<: CPT | Performed by: INTERNAL MEDICINE

## 2019-09-26 PROCEDURE — 2500000003 HC RX 250 WO HCPCS: Performed by: PHYSICIAN ASSISTANT

## 2019-09-26 PROCEDURE — 80048 BASIC METABOLIC PNL TOTAL CA: CPT

## 2019-09-26 PROCEDURE — 6360000002 HC RX W HCPCS: Performed by: INTERNAL MEDICINE

## 2019-09-26 PROCEDURE — 6360000002 HC RX W HCPCS: Performed by: HOSPITALIST

## 2019-09-26 PROCEDURE — C9113 INJ PANTOPRAZOLE SODIUM, VIA: HCPCS | Performed by: INTERNAL MEDICINE

## 2019-09-26 PROCEDURE — 83735 ASSAY OF MAGNESIUM: CPT

## 2019-09-26 PROCEDURE — 6370000000 HC RX 637 (ALT 250 FOR IP): Performed by: INTERNAL MEDICINE

## 2019-09-26 PROCEDURE — 94760 N-INVAS EAR/PLS OXIMETRY 1: CPT

## 2019-09-26 PROCEDURE — 94640 AIRWAY INHALATION TREATMENT: CPT

## 2019-09-26 PROCEDURE — 6360000002 HC RX W HCPCS: Performed by: PHYSICIAN ASSISTANT

## 2019-09-26 PROCEDURE — 36415 COLL VENOUS BLD VENIPUNCTURE: CPT

## 2019-09-26 PROCEDURE — 2580000003 HC RX 258: Performed by: PHYSICIAN ASSISTANT

## 2019-09-26 RX ORDER — LORAZEPAM 2 MG/ML
1 CONCENTRATE ORAL EVERY 4 HOURS PRN
Qty: 30 ML | Refills: 0 | Status: SHIPPED | OUTPATIENT
Start: 2019-09-26 | End: 2019-10-03

## 2019-09-26 RX ORDER — ATROPINE SULFATE 10 MG/ML
SOLUTION/ DROPS OPHTHALMIC
Qty: 15 ML | Refills: 0 | Status: SHIPPED | OUTPATIENT
Start: 2019-09-26

## 2019-09-26 RX ORDER — MORPHINE SULFATE 100 MG/5ML
10 SOLUTION ORAL
Qty: 30 ML | Refills: 0 | Status: SHIPPED | OUTPATIENT
Start: 2019-09-26 | End: 2019-10-03

## 2019-09-26 RX ORDER — NITROGLYCERIN 80 MG/1
1 PATCH TRANSDERMAL DAILY
Qty: 30 PATCH | Refills: 1 | Status: SHIPPED | OUTPATIENT
Start: 2019-09-26 | End: 2020-09-25

## 2019-09-26 RX ORDER — CLONIDINE 0.3 MG/24H
1 PATCH, EXTENDED RELEASE TRANSDERMAL
Qty: 4 PATCH | Refills: 1 | Status: SHIPPED | OUTPATIENT
Start: 2019-09-26 | End: 2020-09-25

## 2019-09-26 RX ADMIN — PANTOPRAZOLE SODIUM 40 MG: 40 INJECTION, POWDER, FOR SOLUTION INTRAVENOUS at 09:09

## 2019-09-26 RX ADMIN — METOPROLOL TARTRATE 10 MG: 5 INJECTION, SOLUTION INTRAVENOUS at 04:14

## 2019-09-26 RX ADMIN — Medication 2 SPRAY: at 05:34

## 2019-09-26 RX ADMIN — NITROGLYCERIN 1 INCH: 20 OINTMENT TOPICAL at 12:33

## 2019-09-26 RX ADMIN — METOPROLOL TARTRATE 10 MG: 5 INJECTION, SOLUTION INTRAVENOUS at 09:10

## 2019-09-26 RX ADMIN — ENOXAPARIN SODIUM 40 MG: 40 INJECTION SUBCUTANEOUS at 09:09

## 2019-09-26 RX ADMIN — HYDRALAZINE HYDROCHLORIDE 10 MG: 20 INJECTION INTRAMUSCULAR; INTRAVENOUS at 11:11

## 2019-09-26 RX ADMIN — Medication 10 ML: at 09:10

## 2019-09-26 RX ADMIN — IPRATROPIUM BROMIDE AND ALBUTEROL SULFATE 1 AMPULE: .5; 3 SOLUTION RESPIRATORY (INHALATION) at 07:55

## 2019-09-26 RX ADMIN — NITROGLYCERIN 1 INCH: 20 OINTMENT TOPICAL at 05:32

## 2019-09-26 ASSESSMENT — PAIN SCALES - PAIN ASSESSMENT IN ADVANCED DEMENTIA (PAINAD)
TOTALSCORE: 2
BODYLANGUAGE: 0
BREATHING: 0
TOTALSCORE: 2
NEGVOCALIZATION: 1
CONSOLABILITY: 1
NEGVOCALIZATION: 1
TOTALSCORE: 2
BODYLANGUAGE: 0
FACIALEXPRESSION: 0
CONSOLABILITY: 1
CONSOLABILITY: 1
BODYLANGUAGE: 0
FACIALEXPRESSION: 0
CONSOLABILITY: 1
BODYLANGUAGE: 0
FACIALEXPRESSION: 0
FACIALEXPRESSION: 0
BREATHING: 0
TOTALSCORE: 2
NEGVOCALIZATION: 1
TOTALSCORE: 2
TOTALSCORE: 2
NEGVOCALIZATION: 1
FACIALEXPRESSION: 0
BREATHING: 0
NEGVOCALIZATION: 1
FACIALEXPRESSION: 0
CONSOLABILITY: 1
NEGVOCALIZATION: 1
BREATHING: 0
BODYLANGUAGE: 0
CONSOLABILITY: 1
BODYLANGUAGE: 0
CONSOLABILITY: 1
TOTALSCORE: 2
FACIALEXPRESSION: 0
BREATHING: 0
NEGVOCALIZATION: 1
BREATHING: 0
BREATHING: 0
BODYLANGUAGE: 0

## 2019-09-26 NOTE — PLAN OF CARE
Problem: Pain:  Goal: Pain level will decrease  Description  Pain level will decrease  Outcome: Ongoing     Problem: Risk for Impaired Skin Integrity  Goal: Tissue integrity - skin and mucous membranes  Description  Structural intactness and normal physiological function of skin and  mucous membranes.   Outcome: Ongoing

## 2019-09-26 NOTE — DISCHARGE SUMMARY
Name:  Mae Taylor  Room:  /3547-64  MRN:    [id number]    Discharge Summary      This discharge summary is in conjunction with a complete physical exam done on the day of discharge. Discharging Physician: Dr. Bethanne Carrel: 9/20/2019  Discharge:  9/26/2019    HPI taken from admission H&P:      The patient is a 78 y.o. female with CAD, hypertension, hyperlipidemia, GERD, arthritis, Bipolar disorder, Depression, anxiety, and dementia who presents to [facility] with c/o shortness of breath. Unable to obtain HPI from patient, obtained per EMR. Her oxygen saturation was dropping while she was sleeping. Ongoing for the last 4-5 days. It was dropping into the 70's. When she wakes up, she is normally 98-99 on RA. Her SpO2 was only 90% this morning. Upon EMS arrival, SpO2 90% with audible wheezing. She was admitted to [facility] in June for femoral fracture. She lives with her son and has home health care. She has been declining since being at home. Her son has had to carry her entire body weight when transferring her to and from the toilet. Patient found to have UTI and pneumonia vs bronchitis. Admitted to med-surg. Diagnoses this Admission and Hospital Course     Acute hypoxic respiratory failure - resolved  - presented with c/o shortness of breath, hypoxia. - admitted to med-surg.    - supplemental O2. Weaned as tolerated. Currently on 2 L- > 1 L--> now on RA  - possibly due to pneumonia vs bronchitis  - CXR with chronic interstitial changes. - CTA no PE, bibasilar scarring       Possible aspiration pneumonia   bronchitis, with bronchospasm  - NPO, speech therapy evaluation--> patient was not taking PO, continued recommendations for NPO   - Continued Levaquin D#6  - Solu-Medrol given, unable to take any other meds by mouth. - Continued IBD  - started to have some edema, decreased rate of IVFs.    - home with hospice, no abx     UTI  - treated with Levaquin as above  - urine cx Carotid Bruit. Trachea midline. Resp: No accessory muscle use. Diminished breath sounds . Mild bibasilar rales  CV: Regular rate. Regular rhythm. No murmur.  No rub  GI:  soft. Nontender . non-distended. hypoactive bowel sounds. No hernia. Skin: Warm and dry. M/S: No cyanosis. No joint deformity. No clubbing. Trace edema  Neuro: Awake. Not following commands, only mumbles to respond, unintelligible  Psych: disoriented, rapid mostly unintelligible speech. CBC:   Recent Labs     09/24/19  1130 09/25/19  0432   WBC 9.5 9.0   HGB 12.9 13.4   HCT 38.5 40.2   MCV 90.1 91.6    146     BMP:   Recent Labs     09/24/19  1130 09/25/19  0432 09/25/19  1626 09/26/19  0425    138  --  136   K 3.3* 3.3* 3.5 3.8    103  --  100   CO2 22 19*  --  23   BUN 17 17  --  14   CREATININE <0.5* <0.5*  --  <0.5*           RADIOLOGY  IR PLACE NG TUBE BY DR Jcarlos Gr   Final Result   Unsuccessful placement of nasogastric tube.         IR PLACE NG TUBE BY DR Jcarlos Gr   Final Result       CT Chest Pulmonary Embolism W Contrast   Final Result   Motion limited study. No evidence for pulmonary embolism or right heart   strain.       Bibasilar dependent atelectasis/scarring. Ground-glass opacities in the   lower lobes bilaterally.           CT Head WO Contrast   Final Result   No acute intracranial abnormality.           XR CHEST PORTABLE   Final Result   Chronic interstitial changes, similar to prior.                  Discharge Medications     Medication List      START taking these medications    atropine 1 % ophthalmic solution  1-2 DROPS  SUB LINGUAL Q 2 HOURS  FOR CHEST CONGESTION     cloNIDine 0.3 MG/24HR Ptwk  Commonly known as:  CATAPRES  Place 1 patch onto the skin every 7 days     LORazepam 2 MG/ML concentrated solution  Commonly known as:  ATIVAN  Place 0.5 mLs under the tongue every 4 hours as needed (ANXIETY) for up to 7 days.   Replaces:  LORazepam 0.5 MG tablet     morphine sulfate 20 MG/ML

## 2019-09-27 ENCOUNTER — CARE COORDINATION (OUTPATIENT)
Dept: CASE MANAGEMENT | Age: 79
End: 2019-09-27

## 2019-10-21 ENCOUNTER — CARE COORDINATION (OUTPATIENT)
Dept: CASE MANAGEMENT | Age: 79
End: 2019-10-21

## 2020-05-29 NOTE — PROGRESS NOTES
No medical clearance needed, LIVING FACILITY WILL TRANSPORT PATIENT TO SURGERY. medication. 2. Physician will be contacted for respiratory rate (RR) greater than 35 breaths per minute. Therapy will be held for heart rate (HR) greater than 140 beats per minute, pending direction from physician. 3. Bronchodilators will be administered via Metered Dose Inhaler (MDI) with spacer when the following criteria are met:  a. Alert and cooperative     b. HR < 140 bpm  c. RR < 30 bpm                d. Can demonstrate a 2-3 second inspiratory hold  4. Bronchodilators will be administered via Hand Held Nebulizer [facility]) to patients when ANY of the following criteria are met  a. Incognizant or uncooperative          b. Patients treated with HHN at Home        c. Unable to demonstrate proper use of MDI with spacer     d. RR > 30 bpm   5. Bronchodilators will be delivered via Metered Dose Inhaler (MDI), HHN, Aerogen to intubated patients on mechanical ventilation. 6. Inhalation medication orders will be delivered and/or substituted as outlined below. Aerosolized Medications Ordering and Administration Guidelines:    1. All Medications will be ordered by a physician, and their frequency and/or modality will be adjusted as defined by the patients Respiratory Severity Index (RSI) score. 2. If the patient does not have documented COPD, consider discontinuing anticholinergics when RSI is less than 9.  3. If the bronchospasm worsens (increased RSI), then the bronchodilator frequency can be increased to a maximum of every 4 hours. If greater than every 4 hours is required, the physician will be contacted. 4. If the bronchospasm improves, the frequency of the bronchodilator can be decreased, based on the patient's RSI, but not less than home treatment regimen frequency. 5. Bronchodilator(s) will be discontinued if patient has a RSI less than 9 and has received no scheduled or as needed treatment for 72  Hrs. Patients Ordered on a Mucolytic Agent:    1.  Must always be administered with a

## 2021-06-13 NOTE — TELEPHONE ENCOUNTER
----- Message from Sigrid Cagle MD sent at 8/27/2018 12:59 PM EDT -----  Contact: pt [phone]  Can u call and find out   ----- Message -----  From: Virginia Reid  Sent: 8/27/2018  12:40 PM  To: Sigrid Cagle MD    Home NP needing to speak to you personally. ----- Message -----  From: Sigrid Cagle MD  Sent: 8/27/2018  11:00 AM  To: Virginia Seo    Son did not tell me that she is getting from different doctor  ----- Message -----  From: Jj Twan: 8/27/2018  10:51 AM  To: Sigrid Cagle MD    Last 2 fills were by Dr. Margaret Berry - address for doctor [facility] [address] [place] [address]. Last filled 6/9/18 for 90 day supply. Please advise.  ----- Message -----  From: Rehana May  Sent: 8/27/2018  10:09 AM  To:  Nuzhat Clemente    Pt needs refill traMADol (ULTRAM) 50 MG tablet   [facility]  Last appt 8/21/18  Next appt 10/17/18    ks 23year-old female complaint of nausea vomiting and diarrhea. Patient was seen 2 days ago diagnosed with bronchitis she was placed on Zithromax and started taking Zithromax this had nausea vomiting and diarrhea and her mouth felt dry. Abdominal Pain   This is a new problem. The problem occurs constantly. Associated with: Antibiotics. The pain is located in the generalized abdominal region. The quality of the pain is cramping. The pain is at a severity of 10/10. Associated symptoms include diarrhea, nausea and vomiting. Nothing worsens the pain. The pain is relieved by nothing. Her past medical history does not include UTI. The patient's surgical history non-contributory. Past Medical History:   Diagnosis Date    Ill-defined condition     seasonal allergies       No past surgical history on file. No family history on file. Social History     Socioeconomic History    Marital status: SINGLE     Spouse name: Not on file    Number of children: Not on file    Years of education: Not on file    Highest education level: Not on file   Occupational History    Not on file   Tobacco Use    Smoking status: Never Smoker   Substance and Sexual Activity    Alcohol use: No    Drug use: No    Sexual activity: Never     Birth control/protection: None   Other Topics Concern    Not on file   Social History Narrative    Not on file     Social Determinants of Health     Financial Resource Strain:     Difficulty of Paying Living Expenses:    Food Insecurity:     Worried About Running Out of Food in the Last Year:     [address] in the Last Year:    Transportation Needs:     Lack of Transportation (Medical):      Lack of Transportation (Non-Medical):    Physical Activity:     Days of Exercise per Week:     Minutes of Exercise per Session:    Stress:     Feeling of Stress :    Social Connections:     Frequency of Communication with Friends and Family:     Frequency of Social Gatherings with Friends and Family:     Attends [employer] Services:     Active Member of Clubs or Organizations:     Attends Club or Organization Meetings:     Marital Status:    Intimate Partner Violence:     Fear of Current or Ex-Partner:     Emotionally Abused:     Physically Abused:     Sexually Abused: ALLERGIES: Patient has no known allergies. Review of Systems   Constitutional: Negative. Negative for activity change. HENT: Negative. Eyes: Negative. Respiratory: Negative. Cardiovascular: Negative. Gastrointestinal: Positive for abdominal pain, diarrhea, nausea and vomiting. Genitourinary: Negative. Musculoskeletal: Negative. Skin: Negative. Neurological: Negative. Psychiatric/Behavioral: Negative. All other systems reviewed and are negative. Vitals:    06/12/21 2344   BP: 126/86   Pulse: 90   Resp: 16   Temp: 99.2 °F (37.3 °C)   SpO2: 97%   Weight: 59 kg (130 lb)   Height: 5' 4\" (1.626 m)            Physical Exam  Vitals and nursing note reviewed. Constitutional:       General: She is not in acute distress. Appearance: She is well-developed. HENT:      Head: Normocephalic and atraumatic. Right Ear: External ear normal.      Left Ear: External ear normal.      Nose: Nose normal.   Eyes:      General: No scleral icterus. Right eye: No discharge. Left eye: No discharge. Conjunctiva/sclera: Conjunctivae normal.      Pupils: Pupils are equal, round, and reactive to light. Cardiovascular:      Rate and Rhythm: Regular rhythm. Pulmonary:      Effort: Pulmonary effort is normal. No respiratory distress. Breath sounds: Normal breath sounds. No stridor. No wheezing or rales. Abdominal:      General: Bowel sounds are normal. There is no distension. Palpations: Abdomen is soft. Tenderness: There is no abdominal tenderness. Musculoskeletal:         General: Normal range of motion. Cervical back: Normal range of motion. Skin:     General: Skin is warm and dry. Findings: No rash. Neurological:      Mental Status: She is alert and oriented to person, place, and time. Motor: No abnormal muscle tone. Coordination: Coordination normal.   Psychiatric:         Behavior: Behavior normal.          MDM  Number of Diagnoses or Management Options  Diagnosis management comments: Symptoms started with antibiotic intake most likely is secondary to that we will stop the antibiotics and keep her on prednisone as this is probably viral illness. Risk of Complications, Morbidity, and/or Mortality  Presenting problems: moderate  Diagnostic procedures: low  Management options: moderate  General comments:  We will stop antibiotics for now give her prescription for amoxicillin that she can start in 2 days    Patient Progress  Patient progress: stable         Procedures

## (undated) DEVICE — COTTON UNDERCAST PADDING,CRIMPED FINISH: Brand: WEBRIL

## (undated) DEVICE — SMARTGOWN SURGICAL GOWN, XL: Brand: CONVERTORS

## (undated) DEVICE — BIT DRL L500MM DIA6X9MM CANN STP L QUIK CPL FOR DH DC TFN

## (undated) DEVICE — SUTURE PDS II SZ 0 L60IN ABSRB VLT L48MM CTX 1/2 CIR Z990G

## (undated) DEVICE — BIT DRL L145MM DIA4.2MM NONSTERILE 3 FLUT NDL PNT QUIK CPL

## (undated) DEVICE — BANDAGE E SELF CLSR 6INX5YD VELC SWIFTWRAP

## (undated) DEVICE — SOLUTION IV IRRIG 500ML 0.9% SODIUM CHL 2F7123

## (undated) DEVICE — 3M™ COBAN™ NL STERILE NON-LATEX SELF-ADHERENT WRAP, 2084S, 4 IN X 5 YD (10 CM X 4,5 M), 18 ROLLS/CASE: Brand: 3M™ COBAN™

## (undated) DEVICE — 3M™ STERI-DRAPE™  ISOLATION DRAPE WITH INCISE FILM AND POUCH 1017: Brand: STERI-DRAPE™

## (undated) DEVICE — TUBING, SUCTION, 3/16" X 10', STRAIGHT: Brand: MEDLINE

## (undated) DEVICE — MAJOR SET UP PK

## (undated) DEVICE — DRAPE C ARM UNIV W41XL74IN CLR PLAS XR VELC CLSR POLY STRP

## (undated) DEVICE — CIRCUIT ANES L72IN 3L BACT AND VIR FLTR EL CONN SGL LIMB

## (undated) DEVICE — ELECTRODE PT RET AD L9FT HI MOIST COND ADH HYDRGEL CORDED

## (undated) DEVICE — GLOVE ORANGE PI 7 1/2   MSG9075

## (undated) DEVICE — SUTURE MCRYL + SZ 4-0 L18IN ABSRB UD L19MM PS-2 3/8 CIR MCP496G

## (undated) DEVICE — GUIDEWIRE ORTH L400MM DIA3.2MM FOR TFN

## (undated) DEVICE — SUTURE ABSORBABLE BRAIDED 2-0 CT-1 27 IN UD VICRYL J259H

## (undated) DEVICE — CHLORAPREP 26ML ORANGE

## (undated) DEVICE — PADDING CAST N ADH 12X6 IN CRIMPED FINISH 100% COTTON WBRLII

## (undated) DEVICE — DRESSING FOAM W4XL10IN SIL RECT ADH WTRPRF FLM BK W/ BORD

## (undated) DEVICE — ROD RMR L950MM DIA2.5MM BALL TIP EXTN FOR IM RM AND BNE

## (undated) DEVICE — COVER,MAYO STAND,STERILE: Brand: MEDLINE

## (undated) DEVICE — SKIN AFFIX SURG ADHESIVE 72/CS 0.55ML: Brand: MEDLINE

## (undated) DEVICE — Z DISCONTINUED PER MEDLINE USE 2752023 DRESSING FOAM W7.62XL7.62CM SIL ADH WTRPRF FLM BK SQ SHP

## (undated) DEVICE — ROD RM 3X950 MM W/ BALL TIP SS STRL

## (undated) DEVICE — MAT TRACK 40 X 72 IN ABSORBENT

## (undated) DEVICE — CUFF RESTRN WR OR ANK BLU FOAM AD

## (undated) DEVICE — SUTURE VCRL 0 L27IN ABSRB VLT CT L40MM 1/2 CIR TAPERPOINT J352H

## (undated) DEVICE — YANKAUER,BULB TIP,W/O VENT,RIGID,STERILE: Brand: MEDLINE

## (undated) DEVICE — SUTURE VCRL + SZ 0 L27IN ABSRB UD L36MM CT-1 1/2 CIR VCPP41D

## (undated) DEVICE — DRAPE,REIN 53X77,STERILE: Brand: MEDLINE